# Patient Record
Sex: FEMALE | Race: BLACK OR AFRICAN AMERICAN | NOT HISPANIC OR LATINO | Employment: FULL TIME | URBAN - METROPOLITAN AREA
[De-identification: names, ages, dates, MRNs, and addresses within clinical notes are randomized per-mention and may not be internally consistent; named-entity substitution may affect disease eponyms.]

---

## 2017-02-08 ENCOUNTER — PATIENT MESSAGE (OUTPATIENT)
Dept: SURGERY | Facility: CLINIC | Age: 47
End: 2017-02-08

## 2017-02-09 RX ORDER — TRIAMCINOLONE ACETONIDE 1 MG/G
1 OINTMENT TOPICAL 2 TIMES DAILY
Qty: 30 G | Refills: 12 | Status: SHIPPED | OUTPATIENT
Start: 2017-02-09 | End: 2017-05-01

## 2017-02-15 ENCOUNTER — PATIENT MESSAGE (OUTPATIENT)
Dept: SPORTS MEDICINE | Facility: CLINIC | Age: 47
End: 2017-02-15

## 2017-02-16 DIAGNOSIS — M17.11 PRIMARY OSTEOARTHRITIS OF RIGHT KNEE: Primary | ICD-10-CM

## 2017-02-16 RX ORDER — VIT C/E/ZN/COPPR/LUTEIN/ZEAXAN 250MG-90MG
1000 CAPSULE ORAL DAILY
Qty: 30 CAPSULE | Refills: 12 | Status: SHIPPED | OUTPATIENT
Start: 2017-02-16 | End: 2017-11-20 | Stop reason: SDUPTHER

## 2017-02-22 ENCOUNTER — PATIENT MESSAGE (OUTPATIENT)
Dept: SPORTS MEDICINE | Facility: CLINIC | Age: 47
End: 2017-02-22

## 2017-02-23 DIAGNOSIS — M17.10 PRIMARY LOCALIZED OSTEOARTHROSIS, LOWER LEG, UNSPECIFIED LATERALITY: ICD-10-CM

## 2017-02-23 RX ORDER — DICLOFENAC SODIUM 100 MG/1
100 TABLET, EXTENDED RELEASE ORAL 2 TIMES DAILY
Qty: 60 TABLET | Refills: 0 | Status: SHIPPED | OUTPATIENT
Start: 2017-02-23 | End: 2017-03-24 | Stop reason: SDUPTHER

## 2017-03-10 ENCOUNTER — HOSPITAL ENCOUNTER (OUTPATIENT)
Dept: RADIOLOGY | Facility: HOSPITAL | Age: 47
Discharge: HOME | End: 2017-03-10
Attending: ORTHOPAEDIC SURGERY
Payer: COMMERCIAL

## 2017-03-10 ENCOUNTER — OFFICE VISIT (OUTPATIENT)
Dept: SPORTS MEDICINE | Facility: CLINIC | Age: 47
End: 2017-03-10
Payer: COMMERCIAL

## 2017-03-10 VITALS
DIASTOLIC BLOOD PRESSURE: 98 MMHG | BODY MASS INDEX: 44.68 KG/M2 | SYSTOLIC BLOOD PRESSURE: 140 MMHG | WEIGHT: 278 LBS | HEART RATE: 76 BPM | HEIGHT: 66 IN

## 2017-03-10 DIAGNOSIS — M25.561 RIGHT KNEE PAIN, UNSPECIFIED CHRONICITY: ICD-10-CM

## 2017-03-10 DIAGNOSIS — M17.11 PRIMARY OSTEOARTHRITIS OF RIGHT KNEE: ICD-10-CM

## 2017-03-10 DIAGNOSIS — M25.561 RIGHT KNEE PAIN, UNSPECIFIED CHRONICITY: Primary | ICD-10-CM

## 2017-03-10 PROCEDURE — 99214 OFFICE O/P EST MOD 30 MIN: CPT | Mod: S$GLB,,, | Performed by: PHYSICIAN ASSISTANT

## 2017-03-10 PROCEDURE — 73564 X-RAY EXAM KNEE 4 OR MORE: CPT | Mod: TC,50,PO

## 2017-03-10 PROCEDURE — 99999 PR PBB SHADOW E&M-EST. PATIENT-LVL III: CPT | Mod: PBBFAC,,, | Performed by: PHYSICIAN ASSISTANT

## 2017-03-10 PROCEDURE — 73564 X-RAY EXAM KNEE 4 OR MORE: CPT | Mod: 26,50,, | Performed by: RADIOLOGY

## 2017-03-10 NOTE — MR AVS SNAPSHOT
[facility]  [address]  [facility] [address]  Phone: [phone]                  Camila Black   3/10/2017 2:30 PM   Appointment    Description:  Female : 1970   Provider:  Cecille Gillis PA-C   Department:  [facility]                To Do List           Future Appointments        Provider Department Dept Phone    2017 8:30 AM Tabitha Pandey MD [facility] - Internal Medicine [phone]      Goals (5 Years of Data)     None      [facility] On [facility]     [facility] Care [facility] -  Assistance  Registered nurses in the [facility] On Call Center provide clinical advisement, health education, appointment booking, and other advisory services.  Call for this free service at [phone].             Medications           Message regarding Medications     Verify the changes and/or additions to your medication regime listed below are the same as discussed with your clinician today.  If any of these changes or additions are incorrect, please notify your healthcare provider.             Verify that the below list of medications is an accurate representation of the medications you are currently taking.  If none reported, the list may be blank. If incorrect, please contact your healthcare provider. Carry this list with you in case of emergency.           Current Medications     cholecalciferol, vitamin D3, 1,000 unit capsule Take 1 capsule (1,000 Units total) by mouth once daily.    cyanocobalamin (VITAMIN B-12) 1000 MCG tablet Take 1 tablet (1,000 mcg total) by mouth once daily.    diclofenac sodium 100 mg 24 hr tablet Take 100 mg by mouth 2 (two) times daily.    econazole nitrate 1 % cream Apply topically once daily. Mix in hydrocortisone cream and aaa qhs    multivitamin capsule Take 1 capsule by mouth once daily.      oxybutynin (DITROPAN XL) 15 MG TR24 TAKE 1 TABLET (15 MG TOTAL) BY MOUTH ONCE DAILY.    triamcinolone acetonide 0.1% (KENALOG)  0.1 % ointment Apply 1 application topically 2 (two) times daily.           Clinical Reference Information           Allergies as of 3/10/2017     No Known Allergies      Immunizations Administered on Date of Encounter - 3/10/2017     None      Language Assistance Services     ATTENTION: Language assistance services are available, free of charge. Please call [phone].      ATENCIÓN: Si habla michelle, tiene a bauer disposición servicios gratuitos de asistencia lingüística. Llame al [phone].     CHÚ Ý: N?u b?n nói Ti?ng Vi?t, có các d?ch v? h? tr? ngôn ng? mi?n phí dành cho b?n. G?i s? [phone].         [facility] complies with applicable Federal civil rights laws and does not discriminate on the basis of race, color, national origin, age, disability, or sex.

## 2017-03-10 NOTE — PROGRESS NOTES
Subjective:          Chief Complaint: Camila Black is a 47 y.o. female who had concerns including Follow-up of the Right Knee.    Pain   Associated symptoms include joint swelling. Pertinent negatives include no abdominal pain, chest pain, chills, congestion, coughing, fever, headaches, myalgias, nausea, numbness, rash, sore throat or vomiting.      Patient presents to clinic for f/u following right knee Synvisc One injection 6 months ago on 8/11/16 by Dr. Irvin. She reports significant relief of pain with the visco supplementation. She has been taking [id number] twice a day and ibuprofen prn for pain relief. Pain today is 5/10. Pain is at its worst at 8/10 when it rains. Pain is worse with walking. Loud clicking while walking is concerning for pt. She is unable to run if she wants to and is no longer able to participate in [place]. She s not wearing her lateral  brace today.    Review of Systems   Constitution: Negative. Negative for chills, fever, weight gain and weight loss.   HENT: Negative for congestion, headaches and sore throat.    Eyes: Negative for blurred vision and double vision.   Cardiovascular: Negative for chest pain, leg swelling and palpitations.   Respiratory: Negative for cough and shortness of breath.    Skin: Negative for itching, poor wound healing and rash.   Musculoskeletal: Positive for joint pain, joint swelling, muscle weakness and stiffness. Negative for back pain and myalgias.   Gastrointestinal: Negative for abdominal pain, constipation, diarrhea, nausea and vomiting.   Genitourinary: Negative.  Negative for frequency and hematuria.   Neurological: Negative for dizziness, numbness, paresthesias and sensory change.   Psychiatric/Behavioral: Negative for altered mental status and depression. The patient is not nervous/anxious.    Allergic/Immunologic: Negative for hives.       Pain Related Questions  Over the past 3 days, what was your average pain during activity?  (I.e. running, jogging, walking, climbing stairs, getting dressed, ect.): 8  Over the past 3 days, what was your highest pain level?: 8  Over the past 3 days, what was your lowest pain level? : 2    Other  How many nights a week are you awakened by your affected body part?: 4  Was the patient's HEIGHT measured or patient reported?: Patient Reported  Was the patient's WEIGHT measured or patient reported?: Measured      Objective:        General: Camila is well-developed, well-nourished, appears stated age, in no acute distress, alert and oriented to time, place and person.     General    Vitals reviewed.  Constitutional: She is oriented to person, place, and time. She appears well-developed and well-nourished. No distress.   HENT:   Head: Normocephalic and atraumatic.   Eyes: EOM are normal.   Neck: Normal range of motion.   Cardiovascular: Normal rate and regular rhythm.    Pulmonary/Chest: Effort normal. No respiratory distress.   Neurological: She is alert and oriented to person, place, and time. She has normal reflexes. No cranial nerve deficit. Coordination normal.   Psychiatric: She has a normal mood and affect. Her behavior is normal. Judgment and thought content normal.     General Musculoskeletal Exam   Gait: normal       Right Knee Exam     Inspection   Erythema: absent  Scars: absent  Swelling: absent  Effusion: effusion  Deformity: deformity  Bruising: absent    Tenderness   The patient is experiencing no tenderness (anterior knee pain).         Range of Motion   Extension: 0   Flexion: 90     Tests   Meniscus   Pedro:  Medial - negative Lateral - negative  Ligament Examination Lachman: normal (-1 to 2mm) PCL-Posterior Drawer: normal (0 to 2mm)     MCL - Valgus: normal (0 to 2mm)  LCL - Varus: normalPivot Shift: normal (Equal)Reverse Pivot Shift: normal (Equal)  Patella   Passive Patellar Tilt: neutral  Patellar Glide (quadrants): Lateral - 1   Medial - 2  Patellar Grind: negative    Other    Sensation: normal    Comments:  Severe genu valgum     Left Knee Exam     Inspection   Erythema: absent  Scars: absent  Swelling: absent  Effusion: absent  Deformity: deformity  Bruising: absent    Tenderness   The patient is experiencing no tenderness.         Range of Motion   Extension: 0   Left knee flexion: 105.     Tests   Meniscus   Pedro:  Medial - negative Lateral - negative  Stability Lachman: normal (-1 to 2mm) PCL-Posterior Drawer: normal (0 to 2mm)  MCL - Valgus: normal (0 to 2mm)  LCL - Varus: normal (0 to 2mm)Pivot Shift: normal (Equal)Reverse Pivot Shift: normal (Equal)  Patella   Passive Patellar Tilt: neutral  Patellar Glide (Quadrants): Lateral - 1 Medial - 2  Patellar Grind: positive    Other   Sensation: normal    Muscle Strength   Right Lower Extremity   Quadriceps:  4/5   Hamstring:  3/5   Left Lower Extremity   Quadriceps:  4/5   Hamstring:  3/5     Reflexes     Left Side  Quadriceps:  2+  Achilles:  2+    Right Side   Quadriceps:  2+  Achilles:  2+    Vascular Exam     Right Pulses  Dorsalis Pedis:      2+  Posterior Tibial:      2+        Left Pulses  Dorsalis Pedis:      2+  Posterior Tibial:      2+        Edema  Right Lower Leg: absent  Left Lower Leg: absent  RADIOGRAPHS:  Findings:  4 views of the knee, comparison 2016.  Advanced tricompartment DJD right knee joint with genu valgus, less prominent DJD changes left knee joint femoral tibial joint, but advanced DJD both patellofemoral joints.  No fracture, dislocation, joint effusion.  bilateral subcutaneous opacities, varicosities, more prominent right..        Assessment:       Encounter Diagnoses   Name Primary?    Right knee pain, unspecified chronicity Yes    Primary osteoarthritis of right knee           Plan:         1. Continue Patellofemoral and Core HEP.    2. IKDC, SF-12 and KOOS was  filled out today in clinic.     RTC in 2 weeks with Cecille Gillis PA-C for right knee Synvisc One injection. Patient will fill out IKDC,  SF-12 and KOOS on return.    3. Continue lateral  brace.    4. Discussed in great detail total knee replacement. Will discuss with Dr. Irvin to determine surgical plan    5. Will link Synvisc One inj to next appt in 2 weeks                                  Patient questionnaires may have been collected.

## 2017-03-24 ENCOUNTER — PATIENT MESSAGE (OUTPATIENT)
Dept: SPORTS MEDICINE | Facility: CLINIC | Age: 47
End: 2017-03-24

## 2017-03-24 DIAGNOSIS — M17.10 PRIMARY LOCALIZED OSTEOARTHROSIS, LOWER LEG, UNSPECIFIED LATERALITY: ICD-10-CM

## 2017-03-24 DIAGNOSIS — M17.11 PRIMARY OSTEOARTHRITIS OF RIGHT KNEE: Primary | ICD-10-CM

## 2017-03-24 RX ORDER — DICLOFENAC SODIUM 100 MG/1
100 TABLET, EXTENDED RELEASE ORAL 2 TIMES DAILY
Qty: 60 TABLET | Refills: 0 | Status: SHIPPED | OUTPATIENT
Start: 2017-03-24 | End: 2017-05-23 | Stop reason: SDUPTHER

## 2017-03-27 ENCOUNTER — HOSPITAL ENCOUNTER (OUTPATIENT)
Dept: RADIOLOGY | Facility: HOSPITAL | Age: 47
Discharge: HOME | End: 2017-03-27
Attending: ORTHOPAEDIC SURGERY
Payer: COMMERCIAL

## 2017-03-27 DIAGNOSIS — M17.11 PRIMARY OSTEOARTHRITIS OF RIGHT KNEE: ICD-10-CM

## 2017-03-27 PROCEDURE — 73700 CT LOWER EXTREMITY W/O DYE: CPT | Mod: TC,RT

## 2017-03-27 PROCEDURE — 73700 CT LOWER EXTREMITY W/O DYE: CPT | Mod: 26,RT,, | Performed by: RADIOLOGY

## 2017-03-30 ENCOUNTER — TELEPHONE (OUTPATIENT)
Dept: SPORTS MEDICINE | Facility: CLINIC | Age: 47
End: 2017-03-30

## 2017-03-31 ENCOUNTER — TELEPHONE (OUTPATIENT)
Dept: SPORTS MEDICINE | Facility: CLINIC | Age: 47
End: 2017-03-31

## 2017-04-03 DIAGNOSIS — M17.11 PRIMARY LOCALIZED OSTEOARTHROSIS, LOWER LEG, RIGHT: Primary | ICD-10-CM

## 2017-05-01 ENCOUNTER — PATIENT MESSAGE (OUTPATIENT)
Dept: INTERNAL MEDICINE | Facility: CLINIC | Age: 47
End: 2017-05-01

## 2017-05-01 ENCOUNTER — HOSPITAL ENCOUNTER (OUTPATIENT)
Dept: RADIOLOGY | Facility: HOSPITAL | Age: 47
Discharge: HOME | End: 2017-05-01
Attending: INTERNAL MEDICINE
Payer: COMMERCIAL

## 2017-05-01 ENCOUNTER — OFFICE VISIT (OUTPATIENT)
Dept: INTERNAL MEDICINE | Facility: CLINIC | Age: 47
End: 2017-05-01
Payer: COMMERCIAL

## 2017-05-01 VITALS
HEIGHT: 67 IN | SYSTOLIC BLOOD PRESSURE: 140 MMHG | WEIGHT: 284.38 LBS | DIASTOLIC BLOOD PRESSURE: 80 MMHG | BODY MASS INDEX: 44.63 KG/M2

## 2017-05-01 DIAGNOSIS — D23.4 BENIGN NEOPLASM OF SCALP AND SKIN OF NECK: ICD-10-CM

## 2017-05-01 DIAGNOSIS — E61.1 IRON DEFICIENCY: ICD-10-CM

## 2017-05-01 DIAGNOSIS — Z98.84 S/P GASTRIC BYPASS: ICD-10-CM

## 2017-05-01 DIAGNOSIS — Z00.00 ANNUAL PHYSICAL EXAM: Primary | ICD-10-CM

## 2017-05-01 DIAGNOSIS — K82.8 GALLBLADDER SLUDGE: ICD-10-CM

## 2017-05-01 DIAGNOSIS — E55.9 MILD VITAMIN D DEFICIENCY: ICD-10-CM

## 2017-05-01 DIAGNOSIS — K21.9 GASTROESOPHAGEAL REFLUX DISEASE WITHOUT ESOPHAGITIS: ICD-10-CM

## 2017-05-01 DIAGNOSIS — Z98.890 S/P ENDOMETRIAL ABLATION: ICD-10-CM

## 2017-05-01 DIAGNOSIS — R35.0 URINARY FREQUENCY: ICD-10-CM

## 2017-05-01 DIAGNOSIS — Z12.31 OTHER SCREENING MAMMOGRAM: ICD-10-CM

## 2017-05-01 DIAGNOSIS — R51.9 HEADACHE, UNSPECIFIED HEADACHE TYPE: ICD-10-CM

## 2017-05-01 DIAGNOSIS — I10 ESSENTIAL HYPERTENSION: ICD-10-CM

## 2017-05-01 DIAGNOSIS — E53.8 B12 DEFICIENCY: ICD-10-CM

## 2017-05-01 DIAGNOSIS — G47.33 OSA (OBSTRUCTIVE SLEEP APNEA): ICD-10-CM

## 2017-05-01 DIAGNOSIS — E66.01 MORBID OBESITY WITH BMI OF 40.0-44.9, ADULT: ICD-10-CM

## 2017-05-01 PROCEDURE — 87086 URINE CULTURE/COLONY COUNT: CPT

## 2017-05-01 PROCEDURE — 93005 ELECTROCARDIOGRAM TRACING: CPT | Mod: S$GLB,,, | Performed by: INTERNAL MEDICINE

## 2017-05-01 PROCEDURE — 99999 PR PBB SHADOW E&M-EST. PATIENT-LVL V: CPT | Mod: 25,PBBFAC,, | Performed by: INTERNAL MEDICINE

## 2017-05-01 PROCEDURE — 99396 PREV VISIT EST AGE 40-64: CPT | Mod: S$GLB,,, | Performed by: INTERNAL MEDICINE

## 2017-05-01 PROCEDURE — 71020 XR CHEST PA AND LATERAL: CPT | Mod: TC

## 2017-05-01 PROCEDURE — 71020 XR CHEST PA AND LATERAL: CPT | Mod: 26,,, | Performed by: RADIOLOGY

## 2017-05-01 PROCEDURE — 77067 SCR MAMMO BI INCL CAD: CPT | Mod: 26,,, | Performed by: RADIOLOGY

## 2017-05-01 PROCEDURE — 77067 SCR MAMMO BI INCL CAD: CPT | Mod: TC

## 2017-05-01 PROCEDURE — 93010 ELECTROCARDIOGRAM REPORT: CPT | Mod: S$GLB,,, | Performed by: INTERNAL MEDICINE

## 2017-05-01 RX ORDER — LOSARTAN POTASSIUM 50 MG/1
50 TABLET ORAL DAILY
Qty: 90 TABLET | Refills: 3 | Status: SHIPPED | OUTPATIENT
Start: 2017-05-01 | End: 2017-09-07

## 2017-05-01 NOTE — MR AVS SNAPSHOT
[facility] - Internal Medicine  [address]  [facility] [address]  Phone: [phone]  Fax: [phone]                  Camila Black   2017 7:30 AM   Office Visit    Description:  Female : 1970   Provider:  Tabitha Pandey MD   Department:  [facility] - Internal Medicine           Reason for Visit     Annual Exam           Diagnoses this Visit        Comments    Annual physical exam    -  Primary     ARPIT (obstructive sleep apnea)         S/P gastric bypass         Morbid obesity with BMI of 40.0-44.9, adult         Headache, unspecified headache type         Other screening mammogram         Gastroesophageal reflux disease without esophagitis         Iron deficiency         Mild vitamin D deficiency         B12 deficiency         Benign neoplasm of scalp and skin of neck         Gallbladder sludge         S/P endometrial ablation         Urinary frequency         Essential hypertension                To Do List           Future Appointments        Provider Department Dept Phone    2017 10:30 AM [facility] [phone]    2017 9:15 AM Cecille Gillis PA-C [facility] [phone]    2017 11:00 AM PRE-ADMIT, [facility] [phone]    7/3/2017 9:15 AM Cecille Gillis PA-C [facility] [phone]    2017 9:15 AM Grace Irvin MD [facility] [phone]      Your Future Surgeries/Procedures     2017   Surgery with Grace Irvin MD   [facility] ([facility])    [address] [facility] [address]   [phone]              Goals (5 Years of Data)     None       These Medications        Disp Refills Start End    losartan (COZAAR) 50 MG tablet 90 tablet 3 2017    Take 1 tablet (50 mg total) by mouth once daily. - Oral    Pharmacy: [facility]/pharmacy #[id number] - [place] -  [address] [facility] #: [phone]         [facility]     [facility] Care [facility] - 24/7 Assistance  Unless otherwise directed by your provider, please contact [facility], our nurse care line that is available for 24/7 assistance.     Registered nurses in the [facility] provide: appointment scheduling, clinical advisement, health education, and other advisory services.  Call: [phone] (toll free)               Medications           Message regarding Medications     Verify the changes and/or additions to your medication regime listed below are the same as discussed with your clinician today.  If any of these changes or additions are incorrect, please notify your healthcare provider.        START taking these NEW medications        Refills    losartan (COZAAR) 50 MG tablet 3    Sig: Take 1 tablet (50 mg total) by mouth once daily.    Class: Normal    Route: Oral      STOP taking these medications     triamcinolone acetonide 0.1% (KENALOG) 0.1 % ointment Apply 1 application topically 2 (two) times daily.           Verify that the below list of medications is an accurate representation of the medications you are currently taking.  If none reported, the list may be blank. If incorrect, please contact your healthcare provider. Carry this list with you in case of emergency.           Current Medications     cholecalciferol, vitamin D3, 1,000 unit capsule Take 1 capsule (1,000 Units total) by mouth once daily.    cyanocobalamin (VITAMIN B-12) 1000 MCG tablet Take 1 tablet (1,000 mcg total) by mouth once daily.    diclofenac sodium 100 mg 24 hr tablet Take 100 mg by mouth 2 (two) times daily.    econazole nitrate 1 % cream Apply topically once daily. Mix in hydrocortisone cream and aaa qhs    losartan (COZAAR) 50 MG tablet Take 1 tablet (50 mg total) by mouth once daily.    multivitamin capsule Take 1 capsule by mouth once daily.      oxybutynin (DITROPAN XL) 15 MG TR24 TAKE 1 TABLET (15  "MG TOTAL) BY MOUTH ONCE DAILY.           Clinical Reference Information           Your Vitals Were     BP Height Weight BMI       140/80 5' 7" (1.702 m) 129 kg (284 lb 6.3 oz) 44.54 kg/m2       Blood Pressure          Most Recent Value    BP  (!)  140/80      Allergies as of 5/1/2017     No Known Allergies      Immunizations Administered on Date of Encounter - 5/1/2017     None      Orders Placed During Today's Visit      Normal Orders This Visit    Ambulatory consult to Bariatric Surgery     Ambulatory consult to Gynecology     Ambulatory consult to Sleep Disorders     EKG 12-lead     Urinalysis     Urine culture     Future Labs/Procedures Expected by Expires    CBC auto differential  5/1/2017 5/1/2018    Comprehensive metabolic panel  5/1/2017 5/1/2018    Ferritin  5/1/2017 7/30/2017    Hemoglobin A1c  5/1/2017 5/1/2018    Iron and TIBC  5/1/2017 7/30/2017    Lipid panel  5/1/2017 5/1/2018    Mammo Digital Screening Bilat with CAD  5/1/2017 7/2/2018    TSH  5/1/2017 5/1/2018    Vitamin B12  5/1/2017 5/1/2018    Vitamin D  5/1/2017 (Approximate) 5/1/2018    X-Ray Chest PA And Lateral  5/1/2017 5/1/2018      Instructions      Obstructive Sleep Apnea  Obstructive sleep apnea is a condition that causes your air passages to become narrowed or blocked during sleep. As a result, breathing stops for short periods. Your body wakes up enough for breathing to begin again, though you don't remember it. The cycle of stopped breathing and brief awakenings can repeat dozens of times a night. This prevents the body from getting to the deeper stages of sleep that are needed for good rest.  Signs of sleep apnea include loud snoring, noisy breathing, and gasping sounds during sleep. Daytime symptoms include waking up tired after a full night's sleep, waking up with headaches, feeling very sleepy or falling asleep during the day, and having problems with memory or concentration.  Risk factors for sleep apnea include:  · Being " overweight  · Being a man, or a woman in menopause  · Smoking  · Using alcohol or sedating medications or herbs  · Having enlarged structures in the nose or throat  Home care  Lifestyle changes that can help treat snoring and sleep apnea include the following:  · If you are overweight, lose weight. Talk to your healthcare provider about a weight-loss plan for you.  · Avoid alcohol for 3 to 4 hours before bedtime. Avoid sedating medications. Ask your healthcare provider about the medications you take.  · If you smoke, talk to your healthcare provider about ways to quit.  · Sleep on your side. This can help prevent gravity from pulling relaxed throat tissues into your breathing passages.  · If you have allergies or sinus problems that block your nose, ask your healthcare provider for help.  Follow up  Follow up with your healthcare provider as advised. A diagnosis of sleep apnea is made with a sleep study. Your healthcare provider can tell you more about this test.  When to seek medical care  Sleep apnea can make you more likely to have certain health problems. These include high blood pressure, heart attack, stroke, and sexual dysfunction. If you have sleep apnea, talk to your healthcare provider about the best treatments for you.  Date Last Reviewed: 5/3/2015  © 4354-6947 [employer]. [address] [place] [address], [place] [address]. All rights reserved. This information is not intended as a substitute for professional medical care. Always follow your healthcare professional's instructions.        What Are Snoring and Obstructive Sleep Apnea?  If youve ever had a stuffed-up nose, you know the feeling of trying to breathe through a very narrow passageway. This is what happens in your throat when you snore. While you sleep, structures in your throat partially block your air passage, making the passage narrow and hard to breathe through. If the entire passage becomes blocked and you cant breathe at all, you  have sleep apnea.     Air moves freely through the nose, mouth and throat.   Snoring  If your throat structures are too large or the muscles relax too much during sleep, the air passage may be partially blocked. As air from the nose or mouth passes around this blockage, the throat structures vibrate, causing the familiar sound of snoring. At times, this sound can be so loud that snorers wake up others, or even themselves, during the night. Snoring gets worse as more and more of the air passage is blocked.     Air is blocked in the back of the mouth and throat.   Obstructive sleep apnea  If the structures completely block the throat, air cant flow to the lungs at all. This is called apnea (meaning no breathing). Since the lungs arent getting fresh air, the brain tells the body to wake up just enough to tighten the muscles and unblock the air passage. With a loud gasp, breathing begins again. This process may be repeated over and over again throughout the night, making your sleep fragmented with a lighter stage of sleep. Even though you do not remember waking up many times during the night to a lighter sleep, you feel tired the next day. The lack of sleep and fresh air can also strain your lungs, heart, and other organs, leading to problems such as high blood pressure, heart attack, or stroke.     Air may not be able to move freely past a deviated septum or swollen turbinates.   Problems in the nose and jaw  Problems in the structure of the nose may obstruct breathing. A crooked (deviated) septum or swollen turbinates can make snoring worse or lead to apnea. Also, a receding jaw may make the tongue sit too far back, so its more likely to block the airway when youre asleep.        Date Last Reviewed: 7/18/2015  © 0889-7182 [employer]. [address] [place] [address], [place] [address]. All rights reserved. This information is not intended as a substitute for professional medical care. Always follow your  healthcare professional's instructions.        Snoring and Sleep Apnea: Notes for a Partner  Snoring and sleep apnea affect your life, as well as your partners. You can help in the treatment of the problem. Be supportive. Encourage your partner both to get treatment and to make the adjustments needed to follow through.    Adjusting to changes  Your partners treatment may involve making changes to certain life habits. You can help your partner make and stick with these changes. For example:  · Support and even join your partners exercise program.  · Be supportive if your partner gets CPAP (continuous positive airway pressure). He or she may feel self-conscious at first. Remind your partner to expect adjustments to CPAP before it feels just right.  · Consider joining a snoring and sleep apnea support group.  Go along to see the healthcare provider  You can give the healthcare provider the best account of your partners nighttime breathing and snoring patterns. Try to go along to healthcare providers appointments. If you cant go, write notes for your partner to give to the healthcare provider. Describe your partners snoring and sleep breathing patterns in detail.  Tips for sleeping with a snorer  Until treatment takes care of your partners snoring:  · Try to go to bed first. It may help if youre already asleep when your partner starts to snore.  · Wear earplugs to bed. A fan or other source of background noise may also help drown out snoring.   Date Last Reviewed: 8/10/2015  © 5164-3338 [employer]. [address] [place] [address]. All rights reserved. This information is not intended as a substitute for professional medical care. Always follow your healthcare professional's instructions.        Possible Gallstone with Biliary Colic (Presumed)    Your abdominal pain is may be due to spasm of the gallbladder. This is called gallbladder or biliary colic. The gallbladder is a small sac  under the liver, which stores and releases bile. Bile is a fluid that aids in the digestion of fat. Eating fatty food stimulates the gallbladder to contract, and release the bile. A gallstone may form in this sac. Although most people do not have symptoms, when the stone moves and blocks the passage of bile out of the bladder, it can cause pain and even an infection.  To be more certain of the diagnosis, you may need to have an ultrasound, CT-scan or other special test.  A number of things increase the risk for developing gallstones:  · Women are more likely  · Obesity  · Increasing age  · Losing or gaining weight quickly  · High calorie diet  · Pregnancy  · Hormone therapy  · Diabetes  The most common symptoms are:  · Abdominal pain, cramping, aching  · Nausea, vomiting  · Fever  Many illnesses can cause these symptoms. This pain usually starts in the upper right side of your abdomen. Sometimes it can radiate to your right shoulder, back and arm. It usually starts suddenly, becomes more intense quickly, and then gradually decreases and disappears over a couple of hours. Elderly people and diabetics may have difficulty showing where the pain is exactly.  Home care  · Rest in bed and follow a clear liquid diet until feeling better. If pain or nausea medicine was given to help with your symptoms, take these as directed.  · You can take acetaminophen or ibuprofen for pain, unless you were given a different pain medicine to use.Note: If you have chronic liver or kidney disease or ever had a stomach ulcer or GI bleeding or are taking blood thinner medications, talk with your doctor before using these medicines.  · Fat in your diet makes the gallbladder contract and may cause increased pain. Therefore, avoid fat in your diet over the next two days and follow a low-fat diet after that. If you are overweight, a low fat diet will help you lose weight.  Follow-up care  If a test was already scheduled for you, keep this  appointment. Be sure you know how to prepare yourself for the test. Usually, you will be asked not to eat or drink anything for at least 8 hours before the test. Schedule an appointment with your own doctor after your test is complete to discuss the findings.  When to seek medical advice  Call your healthcare provider if any of the following occur:  · Pain gets worse or moves to the right lower abdomen  · Repeated vomiting  · Swelling of the abdomen  · Pain lasts over 6 hours  · Fever of 100.4º F (38º C) or higher, or as directed by your healthcare provider  · Weakness, dizziness  · Dark urine or light colored stools  · Yellow color of the skin or eyes  · Chest, arm, back, neck or jaw pain  Call 911  Get emergency medical care right away if any of these occur:  · Trouble breathing  · Very confused  · Very drowsy or trouble awakening  · Fainting or loss of consciousness  · Rapid heart rate  Date Last Reviewed: 8/23/2015  © 7138-7846 [employer]. [address] [place] [address], [place] [address]. All rights reserved. This information is not intended as a substitute for professional medical care. Always follow your healthcare professional's instructions.        Treating Gallstones    The gallbladder is an organ that stores bile. This is a substance that helps with digestion. Deposits in bile can clump together, creating hard, pebble-like stones. These gallstones may not cause any symptoms. In most cases, gallstones have no symptoms. In some cases, though, they irritate the walls of the gallbladder. Or they can block flow of bile out of the gallbladder. If they fall into the common bile duct, stones can block the flow of bile into the small bowel. This can lead to jaundice, pain, or serious infection. Stones are treated only if you have symptoms. If treatment is needed, your healthcare provider will discuss your choices with you. The most common treatments are listed below.  Medicine  Medicines to dissolve  gallstones don't really work.   ERCP  ERCP (endoscopic retrograde cholangiopancreatography) is an outpatient procedure that needs heavy sedation to remove stones. It uses a thin tube with video and X-rays to locate stones blocking the flow of bile. The stones are then removed. ERCP may be done alone. Or it may be used before surgery is done to remove the gallbladder.  Surgery  A cholecystectomy is an operation to remove the gallbladder and its contents (gallstones). Today, most cholecystectomies are done laparoscopically. This means using several very small abdominal incisions. Cholecystectomy may also be done with the traditional single, larger incision.   Prevent future symptoms  After treatment, you should follow a low-fat diet. This diet includes lean meats, lean poultry, and fish. Avoid full-fat dairy products. And limit vegetable oils. Read food labels to be sure theyre low in fat.   Date Last Reviewed: 5/1/2016  © 9183-3695 [employer]. [address] [place] [address]. All rights reserved. This information is not intended as a substitute for professional medical care. Always follow your healthcare professional's instructions.        What are Gallstones?    The gallbladder stores bile, a fluid made by the liver. Bile helps digest fats in the foods you eat. Gallstones form when certain substances in the bile crystallize and become solid. In some cases, the stones dont cause any symptoms. In others, they irritate the walls of the gallbladder. More serious problems can occur if stones move into nearby ducts--such as the common bile duct--and cause blockages. This can block the flow of bile and can lead to pain, nausea, and infection.  Common symptoms  Gallbladder problems can cause painful attacks, often after a meal. Some people have only one attack. Others have many. Common symptoms include:  · Severe, steady pain or aching in the upper right abdomen, back, or right shoulder blade,  lasting from 30 minutes to several hours  · A dull ache beneath the ribs or breastbone  · Nausea, upset stomach, or vomiting  · Jaundice (a buildup of bile chemicals in the blood), which causes yellowing of the skin and eyes, dark urine, and itching. Call your doctor immediately if this system develops.  Treating gallstones  If your stones are not causing symptoms, you may choose to delay treatment. But if youve had one or more painful attacks, your doctor will likely recommend removing your gallbladder. This prevents more stones from forming and causing attacks. It also helps prevent complications, such as stones passing into the ducts and causing infection or pancreatitis. After the gallbladder is removed, your liver will still make bile to aid digestion.     If youre pregnant  Hormone changes during pregnancy can make bile more likely to form stones. If your gallbladder needs to be removed, your doctor will talk with you about the timing for surgery. In some cases, it can be delayed until after childbirth. In others, you may have surgery during pregnancy. This helps protect you and your babys health.   Date Last Reviewed: 3/15/2014  © 4179-3210 [employer]. [address] [place] [address]. All rights reserved. This information is not intended as a substitute for professional medical care. Always follow your healthcare professional's instructions.        Having Laparoscopic Cholecystectomy  Small incisions are made in your belly (abdomen). The scope is put through one of the incisions. Surgical tools are put through other incisions.  Small clips are used to close off the connection between the gallbladder and the bile duct. The gallbladder is then detached from the liver.  The gallbladder is removed through one of the incisions. Bile still flows from the liver to the small intestine.  When the surgery is done, all tools are removed. Incisions are closed with stitches (sutures) or  staples. Sometimes, a laparoscopic surgery may need to be changed to an open surgery. This method uses one large incision. This change may happen because of scar tissue, unusual anatomy, or for some other reason.     Possible incision sites.   Youve had painful attacks caused by gallstones. Because of this, you are having surgery to remove your gallbladder. This is called cholecystectomy. A method called laparoscopy will be used. This allows surgery to be done through a few small cuts (incisions).  Before your surgery  · Tell your provider what medicines you take. This includes prescription medicines, over-the-counter medicines, street drugs, herbs, vitamins, and other supplements. Be sure to mention if you take prescription blood thinners. This includes warfarin, clopidogrel, ibuprofen, and aspirin.  · If you drink alcohol, tell your provider how much you drink. This is very important if you are a heavy drinker or have had alcohol withdrawal symptoms in the past. Alcohol withdrawal can be dangerous. But the symptoms can be safely managed if your healthcare provider knows your alcohol history.  · Have any tests your provider asks for, such as blood tests.  · Dont eat or drink after midnight the night before your surgery. This includes water, coffee, and mints.  The day of surgery  · Your provider may have you take your normal medicine with a sip of water. Check with your provider.   When you arrive, you will prepare for surgery:  · An IV (intravenous) line will be put into a vein in your arm or hand. This gives you fluids and medicine.  · An anesthesiologist will talk with you about anesthesia. This is medicine used to prevent pain. You will receive general anesthesia. This puts you into a deep sleep-like state through the procedure.  During laparoscopic surgery  For this surgery, a thin tube with a tiny camera is used. This is called a laparoscope. The scope sends images from inside your body to a video screen.  This lets the surgeon view and work on your gallbladder:  · Small incisions are made in your belly (abdomen). The scope is put through one of the incisions. Surgical tools are put through other incisions.  · Small clips are used to close off the connection between the gallbladder and the bile duct. The gallbladder is then detached from the liver.  · The gallbladder is removed through one of the incisions. Bile still flows from the liver to the small intestine.  · When the surgery is done, all tools are removed. Incisions are closed with stitches (sutures) or staples. Sometimes, a laparoscopic surgery may need to be changed to an open surgery. This method uses one large incision. This change may happen because of scar tissue, unusual anatomy, or for some other reason.  After surgery  You will be sent to a room to wake up from the anesthesia. You will likely go home the same day. In some cases, an overnight stay is needed. When you are released to go home, have a family member or friend ready to drive you.  Risks and possible complications of gallbladder surgery  All surgeries have risks. The risks of gallbladder surgery include:  · Bleeding  · Infection  · Injury to the common bile duct or nearby organs  · Blood clots in the legs  · Bile leaks  · Hernia at incision site  · Pneumonia   Date Last Reviewed: 7/1/2016  © 5735-9914 [employer]. [address] [place] [address], [place] [address]. All rights reserved. This information is not intended as a substitute for professional medical care. Always follow your healthcare professional's instructions.        Losartan tablets  What is this medicine?  LOSARTAN (raegan AMRIK tan) is used to treat high blood pressure and to reduce the risk of stroke in certain patients. This drug also slows the progression of kidney disease in patients with diabetes.  How should I use this medicine?  Take this medicine by mouth with a glass of water. Follow the directions on the prescription  label. This medicine can be taken with or without food. Take your doses at regular intervals. Do not take your medicine more often than directed.  Talk to your pediatrician regarding the use of this medicine in children. Special care may be needed.  What side effects may I notice from receiving this medicine?  Side effects that you should report to your doctor or health care professional as soon as possible:  · confusion, dizziness, light headedness or fainting spells  · decreased amount of urine passed  · difficulty breathing or swallowing, hoarseness, or tightening of the throat  · fast or irregular heart beat, palpitations, or chest pain  · skin rash, itching  · swelling of your face, lips, tongue, hands, or feet  Side effects that usually do not require medical attention (report to your doctor or health care professional if they continue or are bothersome):  · cough  · decreased sexual function or desire  · headache  · nasal congestion or stuffiness  · nausea or stomach pain  · sore or cramping muscles  What may interact with this medicine?  · blood pressure medicines  · diuretics, especially triamterene, spironolactone, or amiloride  · fluconazole  · NSAIDs, medicines for pain and inflammation, like ibuprofen or naproxen  · potassium salts or potassium supplements  · rifampin  What if I miss a dose?  If you miss a dose, take it as soon as you can. If it is almost time for your next dose, take only that dose. Do not take double or extra doses.  Where should I keep my medicine?  Keep out of the reach of children.  Store at room temperature between 15 and 30 degrees C (59 and 86 degrees F). Protect from light. Keep container tightly closed. Throw away any unused medicine after the expiration date.  What should I tell my health care provider before I take this medicine?  They need to know if you have any of these conditions:  · heart failure  · kidney or liver disease  · an unusual or allergic reaction to losartan,  other medicines, foods, dyes, or preservatives  · pregnant or trying to get pregnant  · breast-feeding  What should I watch for while using this medicine?  Visit your doctor or health care professional for regular checks on your progress. Check your blood pressure as directed. Ask your doctor or health care professional what your blood pressure should be and when you should contact him or her. Call your doctor or health care professional if you notice an irregular or fast heart beat.  Women should inform their doctor if they wish to become pregnant or think they might be pregnant. There is a potential for serious side effects to an unborn child, particularly in the second or third trimester. Talk to your health care professional or pharmacist for more information.  You may get drowsy or dizzy. Do not drive, use machinery, or do anything that needs mental alertness until you know how this drug affects you. Do not stand or sit up quickly, especially if you are an older patient. This reduces the risk of dizzy or fainting spells. Alcohol can make you more drowsy and dizzy. Avoid alcoholic drinks.  Avoid salt substitutes unless you are told otherwise by your doctor or health care professional.  Do not treat yourself for coughs, colds, or pain while you are taking this medicine without asking your doctor or health care professional for advice. Some ingredients may increase your blood pressure.  Date Last Reviewed:   NOTE:This sheet is a summary. It may not cover all possible information. If you have questions about this medicine, talk to your doctor, pharmacist, or health care provider. Copyright© 2016 [employer]        Taking Your Blood Pressure  Blood pressure is the force of blood against the artery wall as it moves from the heart through the blood vessels. You can take your own blood pressure reading using a digital monitor. Take readings as often as your healthcare provider instructs. Take each reading at the same  time of day.  Step 1. Relax    · Take your blood pressure at the same time every day, such as in the morning or evening, or at the time your healthcare provider recommends.  · Wait at least a half-hour after smoking, eating, drinking caffeinated beverages, or exercising.  · Sit comfortably at a table with both feet on the floor. Do not cross your legs or feet. Place the monitor near you.  · Rest for a few minutes before you begin.  Step 2. Wrap the cuff    · Place your arm on the table, palm up. Your arm should be at the level of your heart. Wrap the cuff around your upper arm, just above your elbow. Its best done on bare skin, not over clothing. Most cuffs will indicate where the brachial artery (the blood vessel in the middle of the arm at the inner side of the elbow) should line up with the cuff. Look in your monitor's instruction booklet for an illustration. You can also bring your cuff to your healthcare provider and have them show you how to correctly place the cuff.  · Make sure your cuff fits. If it doesnt wrap around your upper arm, order a larger cuff.  Step 3. Inflate the cuff    · Pump the cuff until the scale reads 160. If you have a self-inflating cuff, push the button that starts the pump.  · The cuff will tighten, then loosen.  · The numbers will change. When they stop changing, your blood pressure reading will appear.  · Take 2 or 3 readings one minute apart.  Step 4. Write down the results of each reading    · Write down your blood pressure numbers for each reading. Note the date and time. Keep your results in one place, such as a notebook. Even if your monitor has a built-in memory, keep a hard copy of the readings.  · Remove the cuff from your arm. Turn off the machine.  · Share your blood pressure records with your healthcare providers at each visit.  Date Last Reviewed: 4/27/2016  © 2119-3176 [employer]. [address] [place] [address], [place] [address]. All rights reserved. This  information is not intended as a substitute for professional medical care. Always follow your healthcare professional's instructions.        Controlling High Blood Pressure  High blood pressure (hypertension) is often called the silent killer. This is because many people who have it dont know it. High blood pressure is defined as 140/90 mm Hg or higher. Know your blood pressure and remember to check it regularly. Doing so can save your life. Here are some things you can do to help control your blood pressure.    Choose heart-healthy foods  · Select low-salt, low-fat foods. Limit sodium intake to 2,400 mg per day or the amount suggested by your healthcare provider.  · Limit canned, dried, cured, packaged, and fast foods. These can contain a lot of salt.  · Eat 8 to 10 servings of fruits and vegetables every day.  · Choose lean meats, fish, or chicken.  · Eat whole-grain pasta, brown rice, and beans.  · Eat 2 to 3 servings of low-fat or fat-free dairy products.  · Ask your doctor about the DASH eating plan. This plan helps reduce blood pressure.  · When you go to a restaurant, ask that your meal be prepared with no added salt.  Maintain a healthy weight  · Ask your healthcare provider how many calories to eat a day. Then stick to that number.  · Ask your healthcare provider what weight range is healthiest for you. If you are overweight, a weight loss of only 3% to 5% of your body weight can help lower blood pressure. Generally, a good weight loss goal is to lose 10% of your body weight in a year.  · Limit snacks and sweets.  · Get regular exercise.  Get up and get active  · Choose activities you enjoy. Find ones you can do with friends or family. This includes bicycling, dancing, walking, and jogging.  · Park farther away from building entrances.  · Use stairs instead of the elevator.  · When you can, walk or bike instead of driving.  · [place] leaves, garden, or do household repairs.  · Be active at a moderate to  vigorous level of physical activity for at least 40 minutes for a minimum of 3 to 4 days a week.   Manage stress  · Make time to relax and enjoy life. Find time to laugh.  · Communicate your concerns with your loved ones and your healthcare provider.  · Visit with family and friends, and keep up with hobbies.  Limit alcohol and quit smoking  · Men should have no more than 2 drinks per day.  · Women should have no more than 1 drink per day.  · Talk with your healthcare provider about quitting smoking. Smoking significantly increases your risk for heart disease and stroke. Ask your healthcare provider about community smoking cessation programs and other options.  Medicines  If lifestyle changes arent enough, your healthcare provider may prescribe high blood pressure medicine. Take all medicines as prescribed. If you have any questions about your medicines, ask your healthcare provider before stopping or changing them.   Date Last Reviewed: 4/27/2016  © 2692-4466 [employer]. [address] [place] [address]. All rights reserved. This information is not intended as a substitute for professional medical care. Always follow your healthcare professional's instructions.        High Blood Pressure, New, Begin Treatment  Your blood pressure was high enough today to start treatment with medicines. Often health care providers dont know what causes high blood pressure (hypertension). But it can be controlled with lifestyle changes and medicines. High blood pressure usually has no symptoms. But it can sometimes cause headache, dizziness, blurred vision, a rushing sound in your ears, chest pain, or shortness of breath. But even without symptoms, high blood pressure thats not treated raises your risk for heart attack and stroke. High blood pressure is a serious health risk and shouldnt be ignored.    A blood pressure reading is made up of two numbers: a higher number over a lower number. The top number  is the systolic pressure. The bottom number is the diastolic pressure. A normal blood pressure is less than 120 over less than 80.  High blood pressure is when either the top number is 140 or higher, or the bottom number is 90 or higher. This must be the result when taking your blood pressure a number of times. The blood pressures between normal and high are called prehypertension.  Home care  If you have high blood pressure, you should do what is listed below to lower your blood pressure. If you are taking medicines for high blood pressure, these methods may reduce or end your need for medicines in the future.  · Begin a weight-loss program if you are overweight.  · Cut back on how much salt you get in your diet. Heres how to do this:  ¨ Dont eat foods that have a lot of salt. These include olives, pickles, smoked meats, and salted potato chips.  ¨ Dont add salt to your food at the table.  ¨ Use only small amounts of salt when cooking.  · Begin an exercise program. Talk with your health care provider about the type of exercise program that would be best for you. It doesn't have to be hard. Even brisk walking for 20 minutes 3 times a week is a good form of exercise.  · Dont take medicines that have heart stimulants. This includes many cold and sinus decongestant pills and sprays, as well as diet pills. Check the warnings about hypertension on the label. Stimulants such as amphetamine or cocaine could be lethal for someone with high blood pressure. Never take these.  · Limit how much caffeine you get in your diet. Switch to caffeine-free products.  · Stop smoking. If you are a long-time smoker, this can be hard. Enroll in a stop-smoking program to make it more likely that you will quit for good.  · Learn how to handle stress. This is an important part of any program to lower blood pressure. Learn about relaxation methods like meditation, yoga, or biofeedback.  · If your provider prescribed medicines, take them  exactly as directed. Missing doses may cause your blood pressure get out of control.  · Consider buying an automatic blood pressure machine. You can get one of these at most pharmacies. Use this to watch your blood pressure at home. Give the results to your provider.  Follow-up care  Because a new blood pressure medicine was started today, its important that you have your blood pressure rechecked. This is to make sure that the medicine is working and that you have no serious side effects. Unless told otherwise, follow up with your health care provider or this facility within the next 3 days.  When to seek medical advice  Call your health care provider right away if any of these occur:  · Chest pain or shortness of breath  · Severe headache  · Throbbing or rushing sound in the ears  · Nosebleed  · Sudden severe pain in your belly (abdomen)  · Extreme drowsiness, confusion, or fainting  · Dizziness or dizziness with a spinning sensation (vertigo)  · Weakness of an arm or leg or one side of the face  · You have problems speaking or seeing   Date Last Reviewed: 11/25/2014  © 0214-7827 [employer]. [address] [place] [address]. All rights reserved. This information is not intended as a substitute for professional medical care. Always follow your healthcare professional's instructions.        Low-Salt Choices  Eating salt (sodium) can make your body retain too much water. Excess water makes your heart work harder. Canned, packaged, and frozen foods are easy to prepare, but they are often high in sodium. Here are some ideas for low-salt foods you can easily prepare yourself.    For breakfast  · Fruit or 100% fruit juice  · Whole-wheat bread or an English muffin. Compare sodium content on labels.  · Low-fat milk or yogurt  · Unsalted eggs  · Shredded wheat  · Corn tortillas  · Unsalted steamed rice  · Regular (not instant) hot cereal, made without salt  Stay away from:  · Sausage, joseph, and  ham  · Flour tortillas  · Packaged muffins, pancakes, and biscuits  · Instant hot cereals  · Cottage cheese  For lunch and dinner  · Fresh fish, chicken, turkey, or meat--baked, broiled, or roasted without salt  · Dry beans, cooked without salt  · Tofu, stir-fried without salt  · Unsalted fresh fruit and vegetables, or frozen or canned fruit and vegetables with no added salt  Stay away from:  · Lunch or deli meat that is cured or smoked  · Cheese  · Tomato juice and catsup  · Canned vegetables, soups, and fish not labeled as no-salt-added or reduced sodium  · Packaged gravies and sauces  · Olives, pickles, and relish  · Bottled salad dressings  For snacks and desserts  · Yogurt  · Unsalted, air popped popcorn  · Unsalted nuts or seeds  Stay away from:  · Pies and cakes  · Packaged dessert mixes  · Pizza  · Canned and packaged puddings  · Pretzels, chips, crackers, and nuts--unless the label says unsalted  Date Last Reviewed: 6/17/2015  © 6304-3588 [employer]. [address] [place] [address]. All rights reserved. This information is not intended as a substitute for professional medical care. Always follow your healthcare professional's instructions.        Prevention Guidelines, Women Ages 40 to 49  Screening tests and vaccines are an important part of managing your health. Health counseling is essential, too. Below are guidelines for these, for women ages 40 to 49. Talk with your healthcare provider to make sure youre up-to-date on what you need.  Screening Who needs it How often   Type 2 diabetes or prediabetes All adults beginning at age 45 and adults without symptoms at any age who are overweight or obese and have 1 or more additional risk factors for diabetes At least every 3 years   Alcohol misuse All women in this age group At routine exams   Blood pressure All women in this age group Every 2 years if your blood pressure is less than 120/80 mm Hg; yearly if your systolic blood  pressure is 120 to 139 mm Hg, or your diastolic blood pressure reading is 80 to 89 mm Hg   Breast cancer All women in this age group Yearly mammogram and clinical breast exam2  Each woman should make her own decision. If a woman decides to have mammograms before age 50, they should be done every 2 years.3   Cervical cancer All women in this age group, except women who have had a complete hysterectomy Pap test every 3 years or Pap test plus human papilloma virus (HPV) test every 5 years   Chlamydia Women at increased risk for infection At routine exams if you're at risk or have symptoms   Depression All women in this age group At routine exams   Gonorrhea Sexually active women at increased risk for infection At routine exams   Hepatitis C Anyone at increased risk; 1 time for those born between 1945 and 1965 At routine exams   High cholesterol or triglycerides All women ages 45 and older who are at risk for coronary artery disease; younger women, talk with your healthcare provider At least every 5 years   HIV All women At routine exams   Obesity All women in this age group At routine exams   Syphilis Women at increased risk for infection - talk with your healthcare provider At routine exams   Tuberculosis Women at increased risk for infection - talk with your healthcare provider Ask your healthcare provider   Vision All women in this age group Complete exam at age 40 and eye exams every 2 to 4 years. If you have a chronic disease, ask your healthcare provider how often you should have your eyes examined.4   Vaccine Who needs it How often   Chickenpox (varicella) All women in this age group who have no record of this infection or vaccine 2 doses; the second dose should be given at least 4 weeks after the first dose   Hepatitis A Women at increased risk for infection - talk with your healthcare provider 2 doses given 6 months apart   Hepatitis B Women at increased risk for infection - talk with your healthcare provider  3 doses over 6 months; second dose should be given 1 month after the first dose; the third dose should be given at least 2 months after the second dose and at least 4 months after the first dose   Haemophilus influenzae Type B (HIB) Women at increased risk 1 to 3 doses   Influenza (flu) All women in this age group Once a year   Measles, mumps, rubella (MMR) All women in this age group who have no record of these infections or vaccines 1 or 2 doses   Meningococcal Women at increased risk for infection - talk with your healthcare provider 1 or more doses   Pneumococcal conjugate vaccine (PCV13) and pneumococcal polysaccharide vaccine (PPSV23) Women at increased risk for infection - talk with your healthcare provider 1 or 2 doses   Tetanus/diphtheria/pertussis (Td/Tdap) booster All women in this age group A one-time dose of Tdap instead of a Td booster after age 18, then Td every 10 years   Counseling Who needs it How often   BRCA gene mutation testing for breast and ovarian cancer susceptibility Women with increased risk for having gene mutation When your risk is known   Breast cancer and chemoprevention Women at high risk for breast cancer When your risk is known   Diet and exercise Women who are overweight or obese When diagnosed, and then at routine exams   Domestic violence Women at the age in which they are able to have children At routine exams   Sexually transmitted infection prevention Women at increased risk for infection - talk with your healthcare provider At routine exams   Use of tobacco and the health effects it can cause All women in this age group Every exam   1American Diabetes Association  2American Cancer Society  3U.S. Preventive Services Task Force  4[facility] [employer]  Date Last Reviewed: 8/27/2015 © 2000-2016 [employer] [address] [place] [address], [place] [address]. All rights reserved. This information is not intended as a substitute for professional medical care.  Always follow your healthcare professional's instructions.             Language Assistance Services     ATTENTION: Language assistance services are available, free of charge. Please call [phone].      ATENCIÓN: Si habsarmad martinez, tiene a bauer disposición servicios gratuitos de asistencia lingüística. Llame al [phone].     CHÚ Ý: N?u b?n nói Ti?ng Vi?t, có các d?ch v? h? tr? ngôn ng? mi?n phí dành cho b?n. G?i s? [phone].         [facility] complies with applicable Federal civil rights laws and does not discriminate on the basis of race, color, national origin, age, disability, or sex.

## 2017-05-01 NOTE — PATIENT INSTRUCTIONS
Obstructive Sleep Apnea  Obstructive sleep apnea is a condition that causes your air passages to become narrowed or blocked during sleep. As a result, breathing stops for short periods. Your body wakes up enough for breathing to begin again, though you don't remember it. The cycle of stopped breathing and brief awakenings can repeat dozens of times a night. This prevents the body from getting to the deeper stages of sleep that are needed for good rest.  Signs of sleep apnea include loud snoring, noisy breathing, and gasping sounds during sleep. Daytime symptoms include waking up tired after a full night's sleep, waking up with headaches, feeling very sleepy or falling asleep during the day, and having problems with memory or concentration.  Risk factors for sleep apnea include:  · Being overweight  · Being a man, or a woman in menopause  · Smoking  · Using alcohol or sedating medications or herbs  · Having enlarged structures in the nose or throat  Home care  Lifestyle changes that can help treat snoring and sleep apnea include the following:  · If you are overweight, lose weight. Talk to your healthcare provider about a weight-loss plan for you.  · Avoid alcohol for 3 to 4 hours before bedtime. Avoid sedating medications. Ask your healthcare provider about the medications you take.  · If you smoke, talk to your healthcare provider about ways to quit.  · Sleep on your side. This can help prevent gravity from pulling relaxed throat tissues into your breathing passages.  · If you have allergies or sinus problems that block your nose, ask your healthcare provider for help.  Follow up  Follow up with your healthcare provider as advised. A diagnosis of sleep apnea is made with a sleep study. Your healthcare provider can tell you more about this test.  When to seek medical care  Sleep apnea can make you more likely to have certain health problems. These include high blood pressure, heart attack, stroke, and sexual  dysfunction. If you have sleep apnea, talk to your healthcare provider about the best treatments for you.  Date Last Reviewed: 5/3/2015  © 9200-1250 [employer] [address] [place] [address], [place] [address]. All rights reserved. This information is not intended as a substitute for professional medical care. Always follow your healthcare professional's instructions.        What Are Snoring and Obstructive Sleep Apnea?  If youve ever had a stuffed-up nose, you know the feeling of trying to breathe through a very narrow passageway. This is what happens in your throat when you snore. While you sleep, structures in your throat partially block your air passage, making the passage narrow and hard to breathe through. If the entire passage becomes blocked and you cant breathe at all, you have sleep apnea.     Air moves freely through the nose, mouth and throat.   Snoring  If your throat structures are too large or the muscles relax too much during sleep, the air passage may be partially blocked. As air from the nose or mouth passes around this blockage, the throat structures vibrate, causing the familiar sound of snoring. At times, this sound can be so loud that snorers wake up others, or even themselves, during the night. Snoring gets worse as more and more of the air passage is blocked.     Air is blocked in the back of the mouth and throat.   Obstructive sleep apnea  If the structures completely block the throat, air cant flow to the lungs at all. This is called apnea (meaning no breathing). Since the lungs arent getting fresh air, the brain tells the body to wake up just enough to tighten the muscles and unblock the air passage. With a loud gasp, breathing begins again. This process may be repeated over and over again throughout the night, making your sleep fragmented with a lighter stage of sleep. Even though you do not remember waking up many times during the night to a lighter sleep, you feel tired  the next day. The lack of sleep and fresh air can also strain your lungs, heart, and other organs, leading to problems such as high blood pressure, heart attack, or stroke.     Air may not be able to move freely past a deviated septum or swollen turbinates.   Problems in the nose and jaw  Problems in the structure of the nose may obstruct breathing. A crooked (deviated) septum or swollen turbinates can make snoring worse or lead to apnea. Also, a receding jaw may make the tongue sit too far back, so its more likely to block the airway when youre asleep.        Date Last Reviewed: 7/18/2015 © 2000-2016 [employer]. [address] [place] [address], [place] [address]. All rights reserved. This information is not intended as a substitute for professional medical care. Always follow your healthcare professional's instructions.        Snoring and Sleep Apnea: Notes for a Partner  Snoring and sleep apnea affect your life, as well as your partners. You can help in the treatment of the problem. Be supportive. Encourage your partner both to get treatment and to make the adjustments needed to follow through.    Adjusting to changes  Your partners treatment may involve making changes to certain life habits. You can help your partner make and stick with these changes. For example:  · Support and even join your partners exercise program.  · Be supportive if your partner gets CPAP (continuous positive airway pressure). He or she may feel self-conscious at first. Remind your partner to expect adjustments to CPAP before it feels just right.  · Consider joining a snoring and sleep apnea support group.  Go along to see the healthcare provider  You can give the healthcare provider the best account of your partners nighttime breathing and snoring patterns. Try to go along to healthcare providers appointments. If you cant go, write notes for your partner to give to the healthcare provider. Describe your partners snoring  and sleep breathing patterns in detail.  Tips for sleeping with a snorer  Until treatment takes care of your partners snoring:  · Try to go to bed first. It may help if youre already asleep when your partner starts to snore.  · Wear earplugs to bed. A fan or other source of background noise may also help drown out snoring.   Date Last Reviewed: 8/10/2015  © 8450-4372 [employer]. [address] [place] [address], [place] [address]. All rights reserved. This information is not intended as a substitute for professional medical care. Always follow your healthcare professional's instructions.        Possible Gallstone with Biliary Colic (Presumed)    Your abdominal pain is may be due to spasm of the gallbladder. This is called gallbladder or biliary colic. The gallbladder is a small sac under the liver, which stores and releases bile. Bile is a fluid that aids in the digestion of fat. Eating fatty food stimulates the gallbladder to contract, and release the bile. A gallstone may form in this sac. Although most people do not have symptoms, when the stone moves and blocks the passage of bile out of the bladder, it can cause pain and even an infection.  To be more certain of the diagnosis, you may need to have an ultrasound, CT-scan or other special test.  A number of things increase the risk for developing gallstones:  · Women are more likely  · Obesity  · Increasing age  · Losing or gaining weight quickly  · High calorie diet  · Pregnancy  · Hormone therapy  · Diabetes  The most common symptoms are:  · Abdominal pain, cramping, aching  · Nausea, vomiting  · Fever  Many illnesses can cause these symptoms. This pain usually starts in the upper right side of your abdomen. Sometimes it can radiate to your right shoulder, back and arm. It usually starts suddenly, becomes more intense quickly, and then gradually decreases and disappears over a couple of hours. Elderly people and diabetics may have difficulty showing  where the pain is exactly.  Home care  · Rest in bed and follow a clear liquid diet until feeling better. If pain or nausea medicine was given to help with your symptoms, take these as directed.  · You can take acetaminophen or ibuprofen for pain, unless you were given a different pain medicine to use.Note: If you have chronic liver or kidney disease or ever had a stomach ulcer or GI bleeding or are taking blood thinner medications, talk with your doctor before using these medicines.  · Fat in your diet makes the gallbladder contract and may cause increased pain. Therefore, avoid fat in your diet over the next two days and follow a low-fat diet after that. If you are overweight, a low fat diet will help you lose weight.  Follow-up care  If a test was already scheduled for you, keep this appointment. Be sure you know how to prepare yourself for the test. Usually, you will be asked not to eat or drink anything for at least 8 hours before the test. Schedule an appointment with your own doctor after your test is complete to discuss the findings.  When to seek medical advice  Call your healthcare provider if any of the following occur:  · Pain gets worse or moves to the right lower abdomen  · Repeated vomiting  · Swelling of the abdomen  · Pain lasts over 6 hours  · Fever of 100.4º F (38º C) or higher, or as directed by your healthcare provider  · Weakness, dizziness  · Dark urine or light colored stools  · Yellow color of the skin or eyes  · Chest, arm, back, neck or jaw pain  Call 911  Get emergency medical care right away if any of these occur:  · Trouble breathing  · Very confused  · Very drowsy or trouble awakening  · Fainting or loss of consciousness  · Rapid heart rate  Date Last Reviewed: 8/23/2015 © 2000-2016 [employer]. [address] [place] [address], [place] [address]. All rights reserved. This information is not intended as a substitute for professional medical care. Always follow your healthcare  professional's instructions.        Treating Gallstones    The gallbladder is an organ that stores bile. This is a substance that helps with digestion. Deposits in bile can clump together, creating hard, pebble-like stones. These gallstones may not cause any symptoms. In most cases, gallstones have no symptoms. In some cases, though, they irritate the walls of the gallbladder. Or they can block flow of bile out of the gallbladder. If they fall into the common bile duct, stones can block the flow of bile into the small bowel. This can lead to jaundice, pain, or serious infection. Stones are treated only if you have symptoms. If treatment is needed, your healthcare provider will discuss your choices with you. The most common treatments are listed below.  Medicine  Medicines to dissolve gallstones don't really work.   ERCP  ERCP (endoscopic retrograde cholangiopancreatography) is an outpatient procedure that needs heavy sedation to remove stones. It uses a thin tube with video and X-rays to locate stones blocking the flow of bile. The stones are then removed. ERCP may be done alone. Or it may be used before surgery is done to remove the gallbladder.  Surgery  A cholecystectomy is an operation to remove the gallbladder and its contents (gallstones). Today, most cholecystectomies are done laparoscopically. This means using several very small abdominal incisions. Cholecystectomy may also be done with the traditional single, larger incision.   Prevent future symptoms  After treatment, you should follow a low-fat diet. This diet includes lean meats, lean poultry, and fish. Avoid full-fat dairy products. And limit vegetable oils. Read food labels to be sure theyre low in fat.   Date Last Reviewed: 5/1/2016  © 7636-9579 [employer]. [address] [place] [address], [place] [address]. All rights reserved. This information is not intended as a substitute for professional medical care. Always follow your healthcare  professional's instructions.        What are Gallstones?    The gallbladder stores bile, a fluid made by the liver. Bile helps digest fats in the foods you eat. Gallstones form when certain substances in the bile crystallize and become solid. In some cases, the stones dont cause any symptoms. In others, they irritate the walls of the gallbladder. More serious problems can occur if stones move into nearby ducts--such as the common bile duct--and cause blockages. This can block the flow of bile and can lead to pain, nausea, and infection.  Common symptoms  Gallbladder problems can cause painful attacks, often after a meal. Some people have only one attack. Others have many. Common symptoms include:  · Severe, steady pain or aching in the upper right abdomen, back, or right shoulder blade, lasting from 30 minutes to several hours  · A dull ache beneath the ribs or breastbone  · Nausea, upset stomach, or vomiting  · Jaundice (a buildup of bile chemicals in the blood), which causes yellowing of the skin and eyes, dark urine, and itching. Call your doctor immediately if this system develops.  Treating gallstones  If your stones are not causing symptoms, you may choose to delay treatment. But if youve had one or more painful attacks, your doctor will likely recommend removing your gallbladder. This prevents more stones from forming and causing attacks. It also helps prevent complications, such as stones passing into the ducts and causing infection or pancreatitis. After the gallbladder is removed, your liver will still make bile to aid digestion.     If youre pregnant  Hormone changes during pregnancy can make bile more likely to form stones. If your gallbladder needs to be removed, your doctor will talk with you about the timing for surgery. In some cases, it can be delayed until after childbirth. In others, you may have surgery during pregnancy. This helps protect you and your babys health.   Date Last Reviewed:  3/15/2014  © 6014-5023 [employer]. [address] [place] [address], [place] [address]. All rights reserved. This information is not intended as a substitute for professional medical care. Always follow your healthcare professional's instructions.        Having Laparoscopic Cholecystectomy  Small incisions are made in your belly (abdomen). The scope is put through one of the incisions. Surgical tools are put through other incisions.  Small clips are used to close off the connection between the gallbladder and the bile duct. The gallbladder is then detached from the liver.  The gallbladder is removed through one of the incisions. Bile still flows from the liver to the small intestine.  When the surgery is done, all tools are removed. Incisions are closed with stitches (sutures) or staples. Sometimes, a laparoscopic surgery may need to be changed to an open surgery. This method uses one large incision. This change may happen because of scar tissue, unusual anatomy, or for some other reason.     Possible incision sites.   Youve had painful attacks caused by gallstones. Because of this, you are having surgery to remove your gallbladder. This is called cholecystectomy. A method called laparoscopy will be used. This allows surgery to be done through a few small cuts (incisions).  Before your surgery  · Tell your provider what medicines you take. This includes prescription medicines, over-the-counter medicines, street drugs, herbs, vitamins, and other supplements. Be sure to mention if you take prescription blood thinners. This includes warfarin, clopidogrel, ibuprofen, and aspirin.  · If you drink alcohol, tell your provider how much you drink. This is very important if you are a heavy drinker or have had alcohol withdrawal symptoms in the past. Alcohol withdrawal can be dangerous. But the symptoms can be safely managed if your healthcare provider knows your alcohol history.  · Have any tests your provider asks for,  such as blood tests.  · Dont eat or drink after midnight the night before your surgery. This includes water, coffee, and mints.  The day of surgery  · Your provider may have you take your normal medicine with a sip of water. Check with your provider.   When you arrive, you will prepare for surgery:  · An IV (intravenous) line will be put into a vein in your arm or hand. This gives you fluids and medicine.  · An anesthesiologist will talk with you about anesthesia. This is medicine used to prevent pain. You will receive general anesthesia. This puts you into a deep sleep-like state through the procedure.  During laparoscopic surgery  For this surgery, a thin tube with a tiny camera is used. This is called a laparoscope. The scope sends images from inside your body to a video screen. This lets the surgeon view and work on your gallbladder:  · Small incisions are made in your belly (abdomen). The scope is put through one of the incisions. Surgical tools are put through other incisions.  · Small clips are used to close off the connection between the gallbladder and the bile duct. The gallbladder is then detached from the liver.  · The gallbladder is removed through one of the incisions. Bile still flows from the liver to the small intestine.  · When the surgery is done, all tools are removed. Incisions are closed with stitches (sutures) or staples. Sometimes, a laparoscopic surgery may need to be changed to an open surgery. This method uses one large incision. This change may happen because of scar tissue, unusual anatomy, or for some other reason.  After surgery  You will be sent to a room to wake up from the anesthesia. You will likely go home the same day. In some cases, an overnight stay is needed. When you are released to go home, have a family member or friend ready to drive you.  Risks and possible complications of gallbladder surgery  All surgeries have risks. The risks of gallbladder surgery  include:  · Bleeding  · Infection  · Injury to the common bile duct or nearby organs  · Blood clots in the legs  · Bile leaks  · Hernia at incision site  · Pneumonia   Date Last Reviewed: 7/1/2016 © 2000-2016 [employer]. [address] [place] [address], [place] [address]. All rights reserved. This information is not intended as a substitute for professional medical care. Always follow your healthcare professional's instructions.        Losartan tablets  What is this medicine?  LOSARTAN (raegan AMRIK tan) is used to treat high blood pressure and to reduce the risk of stroke in certain patients. This drug also slows the progression of kidney disease in patients with diabetes.  How should I use this medicine?  Take this medicine by mouth with a glass of water. Follow the directions on the prescription label. This medicine can be taken with or without food. Take your doses at regular intervals. Do not take your medicine more often than directed.  Talk to your pediatrician regarding the use of this medicine in children. Special care may be needed.  What side effects may I notice from receiving this medicine?  Side effects that you should report to your doctor or health care professional as soon as possible:  · confusion, dizziness, light headedness or fainting spells  · decreased amount of urine passed  · difficulty breathing or swallowing, hoarseness, or tightening of the throat  · fast or irregular heart beat, palpitations, or chest pain  · skin rash, itching  · swelling of your face, lips, tongue, hands, or feet  Side effects that usually do not require medical attention (report to your doctor or health care professional if they continue or are bothersome):  · cough  · decreased sexual function or desire  · headache  · nasal congestion or stuffiness  · nausea or stomach pain  · sore or cramping muscles  What may interact with this medicine?  · blood pressure medicines  · diuretics, especially triamterene,  spironolactone, or amiloride  · fluconazole  · NSAIDs, medicines for pain and inflammation, like ibuprofen or naproxen  · potassium salts or potassium supplements  · rifampin  What if I miss a dose?  If you miss a dose, take it as soon as you can. If it is almost time for your next dose, take only that dose. Do not take double or extra doses.  Where should I keep my medicine?  Keep out of the reach of children.  Store at room temperature between 15 and 30 degrees C (59 and 86 degrees F). Protect from light. Keep container tightly closed. Throw away any unused medicine after the expiration date.  What should I tell my health care provider before I take this medicine?  They need to know if you have any of these conditions:  · heart failure  · kidney or liver disease  · an unusual or allergic reaction to losartan, other medicines, foods, dyes, or preservatives  · pregnant or trying to get pregnant  · breast-feeding  What should I watch for while using this medicine?  Visit your doctor or health care professional for regular checks on your progress. Check your blood pressure as directed. Ask your doctor or health care professional what your blood pressure should be and when you should contact him or her. Call your doctor or health care professional if you notice an irregular or fast heart beat.  Women should inform their doctor if they wish to become pregnant or think they might be pregnant. There is a potential for serious side effects to an unborn child, particularly in the second or third trimester. Talk to your health care professional or pharmacist for more information.  You may get drowsy or dizzy. Do not drive, use machinery, or do anything that needs mental alertness until you know how this drug affects you. Do not stand or sit up quickly, especially if you are an older patient. This reduces the risk of dizzy or fainting spells. Alcohol can make you more drowsy and dizzy. Avoid alcoholic drinks.  Avoid salt  substitutes unless you are told otherwise by your doctor or health care professional.  Do not treat yourself for coughs, colds, or pain while you are taking this medicine without asking your doctor or health care professional for advice. Some ingredients may increase your blood pressure.  Date Last Reviewed:   NOTE:This sheet is a summary. It may not cover all possible information. If you have questions about this medicine, talk to your doctor, pharmacist, or health care provider. Copyright© 2016 [employer]        Taking Your Blood Pressure  Blood pressure is the force of blood against the artery wall as it moves from the heart through the blood vessels. You can take your own blood pressure reading using a digital monitor. Take readings as often as your healthcare provider instructs. Take each reading at the same time of day.  Step 1. Relax    · Take your blood pressure at the same time every day, such as in the morning or evening, or at the time your healthcare provider recommends.  · Wait at least a half-hour after smoking, eating, drinking caffeinated beverages, or exercising.  · Sit comfortably at a table with both feet on the floor. Do not cross your legs or feet. Place the monitor near you.  · Rest for a few minutes before you begin.  Step 2. Wrap the cuff    · Place your arm on the table, palm up. Your arm should be at the level of your heart. Wrap the cuff around your upper arm, just above your elbow. Its best done on bare skin, not over clothing. Most cuffs will indicate where the brachial artery (the blood vessel in the middle of the arm at the inner side of the elbow) should line up with the cuff. Look in your monitor's instruction booklet for an illustration. You can also bring your cuff to your healthcare provider and have them show you how to correctly place the cuff.  · Make sure your cuff fits. If it doesnt wrap around your upper arm, order a larger cuff.  Step 3. Inflate the cuff    · Pump the  cuff until the scale reads 160. If you have a self-inflating cuff, push the button that starts the pump.  · The cuff will tighten, then loosen.  · The numbers will change. When they stop changing, your blood pressure reading will appear.  · Take 2 or 3 readings one minute apart.  Step 4. Write down the results of each reading    · Write down your blood pressure numbers for each reading. Note the date and time. Keep your results in one place, such as a notebook. Even if your monitor has a built-in memory, keep a hard copy of the readings.  · Remove the cuff from your arm. Turn off the machine.  · Share your blood pressure records with your healthcare providers at each visit.  Date Last Reviewed: 4/27/2016 © 2000-2016 [employer]. [address] [place] [address], [place] [address]. All rights reserved. This information is not intended as a substitute for professional medical care. Always follow your healthcare professional's instructions.        Controlling High Blood Pressure  High blood pressure (hypertension) is often called the silent killer. This is because many people who have it dont know it. High blood pressure is defined as 140/90 mm Hg or higher. Know your blood pressure and remember to check it regularly. Doing so can save your life. Here are some things you can do to help control your blood pressure.    Choose heart-healthy foods  · Select low-salt, low-fat foods. Limit sodium intake to 2,400 mg per day or the amount suggested by your healthcare provider.  · Limit canned, dried, cured, packaged, and fast foods. These can contain a lot of salt.  · Eat 8 to 10 servings of fruits and vegetables every day.  · Choose lean meats, fish, or chicken.  · Eat whole-grain pasta, brown rice, and beans.  · Eat 2 to 3 servings of low-fat or fat-free dairy products.  · Ask your doctor about the DASH eating plan. This plan helps reduce blood pressure.  · When you go to a restaurant, ask that your meal be prepared  with no added salt.  Maintain a healthy weight  · Ask your healthcare provider how many calories to eat a day. Then stick to that number.  · Ask your healthcare provider what weight range is healthiest for you. If you are overweight, a weight loss of only 3% to 5% of your body weight can help lower blood pressure. Generally, a good weight loss goal is to lose 10% of your body weight in a year.  · Limit snacks and sweets.  · Get regular exercise.  Get up and get active  · Choose activities you enjoy. Find ones you can do with friends or family. This includes bicycling, dancing, walking, and jogging.  · Park farther away from building entrances.  · Use stairs instead of the elevator.  · When you can, walk or bike instead of driving.  · [place] leaves, garden, or do household repairs.  · Be active at a moderate to vigorous level of physical activity for at least 40 minutes for a minimum of 3 to 4 days a week.   Manage stress  · Make time to relax and enjoy life. Find time to laugh.  · Communicate your concerns with your loved ones and your healthcare provider.  · Visit with family and friends, and keep up with hobbies.  Limit alcohol and quit smoking  · Men should have no more than 2 drinks per day.  · Women should have no more than 1 drink per day.  · Talk with your healthcare provider about quitting smoking. Smoking significantly increases your risk for heart disease and stroke. Ask your healthcare provider about community smoking cessation programs and other options.  Medicines  If lifestyle changes arent enough, your healthcare provider may prescribe high blood pressure medicine. Take all medicines as prescribed. If you have any questions about your medicines, ask your healthcare provider before stopping or changing them.   Date Last Reviewed: 4/27/2016  © 4542-2083 [employer]. [address] [place] [address], [place] [address]. All rights reserved. This information is not intended as a substitute for  professional medical care. Always follow your healthcare professional's instructions.        High Blood Pressure, New, Begin Treatment  Your blood pressure was high enough today to start treatment with medicines. Often health care providers dont know what causes high blood pressure (hypertension). But it can be controlled with lifestyle changes and medicines. High blood pressure usually has no symptoms. But it can sometimes cause headache, dizziness, blurred vision, a rushing sound in your ears, chest pain, or shortness of breath. But even without symptoms, high blood pressure thats not treated raises your risk for heart attack and stroke. High blood pressure is a serious health risk and shouldnt be ignored.    A blood pressure reading is made up of two numbers: a higher number over a lower number. The top number is the systolic pressure. The bottom number is the diastolic pressure. A normal blood pressure is less than 120 over less than 80.  High blood pressure is when either the top number is 140 or higher, or the bottom number is 90 or higher. This must be the result when taking your blood pressure a number of times. The blood pressures between normal and high are called prehypertension.  Home care  If you have high blood pressure, you should do what is listed below to lower your blood pressure. If you are taking medicines for high blood pressure, these methods may reduce or end your need for medicines in the future.  · Begin a weight-loss program if you are overweight.  · Cut back on how much salt you get in your diet. Heres how to do this:  ¨ Dont eat foods that have a lot of salt. These include olives, pickles, smoked meats, and salted potato chips.  ¨ Dont add salt to your food at the table.  ¨ Use only small amounts of salt when cooking.  · Begin an exercise program. Talk with your health care provider about the type of exercise program that would be best for you. It doesn't have to be hard. Even brisk  walking for 20 minutes 3 times a week is a good form of exercise.  · Dont take medicines that have heart stimulants. This includes many cold and sinus decongestant pills and sprays, as well as diet pills. Check the warnings about hypertension on the label. Stimulants such as amphetamine or cocaine could be lethal for someone with high blood pressure. Never take these.  · Limit how much caffeine you get in your diet. Switch to caffeine-free products.  · Stop smoking. If you are a long-time smoker, this can be hard. Enroll in a stop-smoking program to make it more likely that you will quit for good.  · Learn how to handle stress. This is an important part of any program to lower blood pressure. Learn about relaxation methods like meditation, yoga, or biofeedback.  · If your provider prescribed medicines, take them exactly as directed. Missing doses may cause your blood pressure get out of control.  · Consider buying an automatic blood pressure machine. You can get one of these at most pharmacies. Use this to watch your blood pressure at home. Give the results to your provider.  Follow-up care  Because a new blood pressure medicine was started today, its important that you have your blood pressure rechecked. This is to make sure that the medicine is working and that you have no serious side effects. Unless told otherwise, follow up with your health care provider or this facility within the next 3 days.  When to seek medical advice  Call your health care provider right away if any of these occur:  · Chest pain or shortness of breath  · Severe headache  · Throbbing or rushing sound in the ears  · Nosebleed  · Sudden severe pain in your belly (abdomen)  · Extreme drowsiness, confusion, or fainting  · Dizziness or dizziness with a spinning sensation (vertigo)  · Weakness of an arm or leg or one side of the face  · You have problems speaking or seeing   Date Last Reviewed: 11/25/2014  © 9296-5167 [employer],  [employer]. [address] [place] [address]. All rights reserved. This information is not intended as a substitute for professional medical care. Always follow your healthcare professional's instructions.        Low-Salt Choices  Eating salt (sodium) can make your body retain too much water. Excess water makes your heart work harder. Canned, packaged, and frozen foods are easy to prepare, but they are often high in sodium. Here are some ideas for low-salt foods you can easily prepare yourself.    For breakfast  · Fruit or 100% fruit juice  · Whole-wheat bread or an English muffin. Compare sodium content on labels.  · Low-fat milk or yogurt  · Unsalted eggs  · Shredded wheat  · Corn tortillas  · Unsalted steamed rice  · Regular (not instant) hot cereal, made without salt  Stay away from:  · Sausage, joseph, and ham  · Flour tortillas  · Packaged muffins, pancakes, and biscuits  · Instant hot cereals  · Cottage cheese  For lunch and dinner  · Fresh fish, chicken, turkey, or meat--baked, broiled, or roasted without salt  · Dry beans, cooked without salt  · Tofu, stir-fried without salt  · Unsalted fresh fruit and vegetables, or frozen or canned fruit and vegetables with no added salt  Stay away from:  · Lunch or deli meat that is cured or smoked  · Cheese  · Tomato juice and catsup  · Canned vegetables, soups, and fish not labeled as no-salt-added or reduced sodium  · Packaged gravies and sauces  · Olives, pickles, and relish  · Bottled salad dressings  For snacks and desserts  · Yogurt  · Unsalted, air popped popcorn  · Unsalted nuts or seeds  Stay away from:  · Pies and cakes  · Packaged dessert mixes  · Pizza  · Canned and packaged puddings  · Pretzels, chips, crackers, and nuts--unless the label says unsalted  Date Last Reviewed: 6/17/2015  © 7713-4644 [employer]. [address] [place] [address]. All rights reserved. This information is not intended as a substitute for professional  medical care. Always follow your healthcare professional's instructions.        Prevention Guidelines, Women Ages 40 to 49  Screening tests and vaccines are an important part of managing your health. Health counseling is essential, too. Below are guidelines for these, for women ages 40 to 49. Talk with your healthcare provider to make sure youre up-to-date on what you need.  Screening Who needs it How often   Type 2 diabetes or prediabetes All adults beginning at age 45 and adults without symptoms at any age who are overweight or obese and have 1 or more additional risk factors for diabetes At least every 3 years   Alcohol misuse All women in this age group At routine exams   Blood pressure All women in this age group Every 2 years if your blood pressure is less than 120/80 mm Hg; yearly if your systolic blood pressure is 120 to 139 mm Hg, or your diastolic blood pressure reading is 80 to 89 mm Hg   Breast cancer All women in this age group Yearly mammogram and clinical breast exam2  Each woman should make her own decision. If a woman decides to have mammograms before age 50, they should be done every 2 years.3   Cervical cancer All women in this age group, except women who have had a complete hysterectomy Pap test every 3 years or Pap test plus human papilloma virus (HPV) test every 5 years   Chlamydia Women at increased risk for infection At routine exams if you're at risk or have symptoms   Depression All women in this age group At routine exams   Gonorrhea Sexually active women at increased risk for infection At routine exams   Hepatitis C Anyone at increased risk; 1 time for those born between 1945 and 1965 At routine exams   High cholesterol or triglycerides All women ages 45 and older who are at risk for coronary artery disease; younger women, talk with your healthcare provider At least every 5 years   HIV All women At routine exams   Obesity All women in this age group At routine exams   Syphilis Women at  increased risk for infection - talk with your healthcare provider At routine exams   Tuberculosis Women at increased risk for infection - talk with your healthcare provider Ask your healthcare provider   Vision All women in this age group Complete exam at age 40 and eye exams every 2 to 4 years. If you have a chronic disease, ask your healthcare provider how often you should have your eyes examined.4   Vaccine Who needs it How often   Chickenpox (varicella) All women in this age group who have no record of this infection or vaccine 2 doses; the second dose should be given at least 4 weeks after the first dose   Hepatitis A Women at increased risk for infection - talk with your healthcare provider 2 doses given 6 months apart   Hepatitis B Women at increased risk for infection - talk with your healthcare provider 3 doses over 6 months; second dose should be given 1 month after the first dose; the third dose should be given at least 2 months after the second dose and at least 4 months after the first dose   Haemophilus influenzae Type B (HIB) Women at increased risk 1 to 3 doses   Influenza (flu) All women in this age group Once a year   Measles, mumps, rubella (MMR) All women in this age group who have no record of these infections or vaccines 1 or 2 doses   Meningococcal Women at increased risk for infection - talk with your healthcare provider 1 or more doses   Pneumococcal conjugate vaccine (PCV13) and pneumococcal polysaccharide vaccine (PPSV23) Women at increased risk for infection - talk with your healthcare provider 1 or 2 doses   Tetanus/diphtheria/pertussis (Td/Tdap) booster All women in this age group A one-time dose of Tdap instead of a Td booster after age 18, then Td every 10 years   Counseling Who needs it How often   BRCA gene mutation testing for breast and ovarian cancer susceptibility Women with increased risk for having gene mutation When your risk is known   Breast cancer and chemoprevention Women  at high risk for breast cancer When your risk is known   Diet and exercise Women who are overweight or obese When diagnosed, and then at routine exams   Domestic violence Women at the age in which they are able to have children At routine exams   Sexually transmitted infection prevention Women at increased risk for infection - talk with your healthcare provider At routine exams   Use of tobacco and the health effects it can cause All women in this age group Every exam   1[facility] Diabetes Association  2American Cancer Society  3[place].S. Preventive Services Task Force  4[facility] [employer]  Date Last Reviewed: 8/27/2015  © 7770-5408 [employer]. [address] [place] [address]. All rights reserved. This information is not intended as a substitute for professional medical care. Always follow your healthcare professional's instructions.

## 2017-05-01 NOTE — PROGRESS NOTES
"Subjective:       Patient ID: Camila Black is a 47 y.o. female.    Chief Complaint: Annual Exam    HPI Comments: Annual exam    Recall gastric bypass 2007.  She had been in the 380 range before surgery, lost about 140 # and has kept it off, but regained 240 to 280#; BMI still > 40.    Struggling with orthopedic issues which have limited her ability to exercise, also getting rashes and chafing from exercise so pretty much has stopped exercising.  Is trying to boost her metabolism.  Has not seen Bariatric team for year- discussed.    Dx with ARPIT 2007 ("significant; CPAP 13 recommended) prior to surgery but did not pursue testing- reviewed again. Some daytime fatigue.    Slight headache on occasion.  One day she thinks she had a migraine.  Says headaches occur less than weekly, maybe twice a month.    No menses for 7 months.  Recall ablation 2015.  Needs to see GYN and have a mammogram.    Abdominal pain 12/15 resolved.  Normal EGD 2/16.    Patient Active Problem List:     B12 deficiency anemia     Mild vitamin D deficiency     Unspecified hypertrophic and atrophic condition of skin     Benign neoplasm of scalp and skin of neck     Other B-complex deficiencies     Varicose veins of lower extremities with other complications     Iron deficiency     Morbid obesity with BMI of 40.0-44.9, adult     S/P endometrial ablation     Primary localized osteoarthrosis, lower leg     ARPIT (obstructive sleep apnea)     Gastroesophageal reflux disease without esophagitis     S/P gastric bypass     Gallbladder sludge              Review of Systems   Constitutional: Positive for unexpected weight change. Negative for activity change, appetite change, chills, fatigue and fever.   HENT: Negative for ear discharge, nosebleeds, sinus pressure and sore throat.    Respiratory: Positive for apnea. Negative for cough, chest tightness, shortness of breath and wheezing.    Cardiovascular: Negative for chest pain, palpitations and leg " swelling.   Gastrointestinal: Negative for abdominal distention, abdominal pain, anal bleeding, blood in stool, constipation, diarrhea, nausea and vomiting.   Genitourinary: Negative for dysuria, frequency, hematuria and vaginal bleeding.   Musculoskeletal: Positive for arthralgias. Negative for gait problem, joint swelling and myalgias.   Skin: Negative for rash.   Neurological: Negative for dizziness, tremors, weakness, light-headedness and headaches.   Hematological: Negative for adenopathy. Does not bruise/bleed easily.   Psychiatric/Behavioral: Negative for confusion, hallucinations, sleep disturbance and suicidal ideas.       Objective:      Physical Exam   Constitutional: She is oriented to person, place, and time. She appears well-developed and well-nourished.   HENT:   Head: Normocephalic and atraumatic.   Right Ear: External ear normal.   Left Ear: External ear normal.   Nose: Nose normal.   Mouth/Throat: Oropharynx is clear and moist. No oropharyngeal exudate.   Eyes: Conjunctivae and EOM are normal. No scleral icterus.   Neck: Normal range of motion. Neck supple. No JVD present. No thyromegaly present.   Cardiovascular: Normal rate, regular rhythm, normal heart sounds and intact distal pulses.  Exam reveals no gallop.    No murmur heard.  Pulmonary/Chest: Effort normal and breath sounds normal. No respiratory distress. She has no wheezes. She exhibits no tenderness.   Abdominal: Soft. Bowel sounds are normal. She exhibits no distension and no mass. There is no tenderness. There is no rebound and no guarding.   Musculoskeletal: Normal range of motion. She exhibits no edema or tenderness.   Lymphadenopathy:     She has no cervical adenopathy.   Neurological: She is alert and oriented to person, place, and time. She displays normal reflexes. No cranial nerve deficit. Coordination normal.   Skin: Skin is warm. No rash noted. No erythema.   Psychiatric: She has a normal mood and affect. Her behavior is  normal. Judgment and thought content normal.   Nursing note and vitals reviewed.      Assessment:       1. Annual physical exam    2. ARPIT (obstructive sleep apnea)    3. S/P gastric bypass    4. Morbid obesity with BMI of 40.0-44.9, adult    5. Headache, unspecified headache type    6. Other screening mammogram    7. Gastroesophageal reflux disease: normal EGD 2/16    8. Iron deficiency    9. Mild vitamin D deficiency    10. B12 deficiency    11. Benign neoplasm of scalp and skin of neck    12. Gallbladder sludge: see ultrasound 12/15    13. S/P endometrial ablation    14. Urinary frequency    15. Essential hypertension        Plan:         Annual physical exam  -     CBC auto differential; Future; Expected date: 5/1/17  -     Comprehensive metabolic panel; Future; Expected date: 5/1/17  -     Hemoglobin A1c; Future; Expected date: 5/1/17  -     Iron and TIBC; Future; Expected date: 5/1/17  -     Ferritin; Future; Expected date: 5/1/17  -     TSH; Future; Expected date: 5/1/17  -     Vitamin B12; Future; Expected date: 5/1/17  -     Vitamin D; Future; Expected date: 5/1/17  -     Lipid panel; Future; Expected date: 5/1/17  -     Ambulatory consult to Gynecology    ARPIT (obstructive sleep apnea)  -     Ambulatory consult to Sleep Disorders    S/P gastric bypass: bariatric follow up    Morbid obesity with BMI of 40.0-44.9, adult  -     Ambulatory consult to Bariatric Surgery    Headache, unspecified headache type discusse may be secondary to her ARPIT.  Address sleep apnea, check labs.   May need Neurology assessment depending on findings    Other screening mammogram  -     Mammo Digital Screening Bilat with CAD; Future; Expected date: 5/1/17    Gastroesophageal reflux disease: normal EGD 2/16.  No alarm sx currently    Iron deficiency:labs and review    Mild vitamin D deficiency: labs and review    B12 deficiency: labs and review    Benign neoplasm of scalp and skin of neck: DERM 11/16 and ongoing    Gallbladder sludge:  see ultrasound 12/15: reviewed.  Denies sx    S/P endometrial ablation: GYN follow up    Urinary frequency  -     Urinalysis  -     Urine culture    Essential hypertension  -     X-Ray Chest PA And Lateral; Future; Expected date: 5/1/17  -     EKG 12-lead: NSR, no ischemia  -     losartan (COZAAR) 50 MG tablet; Take 1 tablet (50 mg total) by mouth once daily.  Dispense: 90 tablet; Refill: 3    I will review all studies and determine further tx depending on findings  1 month follow up

## 2017-05-02 LAB — BACTERIA UR CULT: NORMAL

## 2017-05-03 ENCOUNTER — LAB VISIT (OUTPATIENT)
Dept: LAB | Facility: HOSPITAL | Age: 47
End: 2017-05-03
Attending: INTERNAL MEDICINE
Payer: COMMERCIAL

## 2017-05-03 DIAGNOSIS — Z00.00 ANNUAL PHYSICAL EXAM: ICD-10-CM

## 2017-05-03 LAB
25(OH)D3+25(OH)D2 SERPL-MCNC: 30 NG/ML
ALBUMIN SERPL BCP-MCNC: 3.3 G/DL
ALP SERPL-CCNC: 85 U/L
ALT SERPL W/O P-5'-P-CCNC: 13 U/L
ANION GAP SERPL CALC-SCNC: 8 MMOL/L
AST SERPL-CCNC: 18 U/L
BASOPHILS # BLD AUTO: 0.03 K/UL
BASOPHILS NFR BLD: 0.5 %
BILIRUB SERPL-MCNC: 1.3 MG/DL
BUN SERPL-MCNC: 13 MG/DL
CALCIUM SERPL-MCNC: 9.1 MG/DL
CHLORIDE SERPL-SCNC: 105 MMOL/L
CHOLEST/HDLC SERPL: 2.9 {RATIO}
CO2 SERPL-SCNC: 25 MMOL/L
CREAT SERPL-MCNC: 0.8 MG/DL
DIFFERENTIAL METHOD: ABNORMAL
EOSINOPHIL # BLD AUTO: 0.1 K/UL
EOSINOPHIL NFR BLD: 2 %
ERYTHROCYTE [DISTWIDTH] IN BLOOD BY AUTOMATED COUNT: 13.8 %
EST. GFR  (AFRICAN AMERICAN): >60 ML/MIN/1.73 M^2
EST. GFR  (NON AFRICAN AMERICAN): >60 ML/MIN/1.73 M^2
FERRITIN SERPL-MCNC: 51 NG/ML
GLUCOSE SERPL-MCNC: 79 MG/DL
HCT VFR BLD AUTO: 39.2 %
HDL/CHOLESTEROL RATIO: 34.7 %
HDLC SERPL-MCNC: 144 MG/DL
HDLC SERPL-MCNC: 50 MG/DL
HGB BLD-MCNC: 12.7 G/DL
IRON SERPL-MCNC: 121 UG/DL
LDLC SERPL CALC-MCNC: 82.8 MG/DL
LYMPHOCYTES # BLD AUTO: 2.8 K/UL
LYMPHOCYTES NFR BLD: 43 %
MCH RBC QN AUTO: 31.4 PG
MCHC RBC AUTO-ENTMCNC: 32.4 %
MCV RBC AUTO: 97 FL
MONOCYTES # BLD AUTO: 0.5 K/UL
MONOCYTES NFR BLD: 8.2 %
NEUTROPHILS # BLD AUTO: 3 K/UL
NEUTROPHILS NFR BLD: 46.3 %
NONHDLC SERPL-MCNC: 94 MG/DL
PLATELET # BLD AUTO: 232 K/UL
PMV BLD AUTO: 9.8 FL
POTASSIUM SERPL-SCNC: 4.5 MMOL/L
PROT SERPL-MCNC: 7.1 G/DL
RBC # BLD AUTO: 4.05 M/UL
SATURATED IRON: 41 %
SODIUM SERPL-SCNC: 138 MMOL/L
TOTAL IRON BINDING CAPACITY: 292 UG/DL
TRANSFERRIN SERPL-MCNC: 197 MG/DL
TRIGL SERPL-MCNC: 56 MG/DL
TSH SERPL DL<=0.005 MIU/L-ACNC: 1.51 UIU/ML
VIT B12 SERPL-MCNC: 402 PG/ML
WBC # BLD AUTO: 6.56 K/UL

## 2017-05-03 PROCEDURE — 83036 HEMOGLOBIN GLYCOSYLATED A1C: CPT

## 2017-05-03 PROCEDURE — 85025 COMPLETE CBC W/AUTO DIFF WBC: CPT

## 2017-05-03 PROCEDURE — 84443 ASSAY THYROID STIM HORMONE: CPT

## 2017-05-03 PROCEDURE — 83540 ASSAY OF IRON: CPT

## 2017-05-03 PROCEDURE — 80053 COMPREHEN METABOLIC PANEL: CPT

## 2017-05-03 PROCEDURE — 80061 LIPID PANEL: CPT

## 2017-05-03 PROCEDURE — 82728 ASSAY OF FERRITIN: CPT

## 2017-05-03 PROCEDURE — 82306 VITAMIN D 25 HYDROXY: CPT

## 2017-05-03 PROCEDURE — 36415 COLL VENOUS BLD VENIPUNCTURE: CPT

## 2017-05-03 PROCEDURE — 82607 VITAMIN B-12: CPT

## 2017-05-04 ENCOUNTER — TELEPHONE (OUTPATIENT)
Dept: INTERNAL MEDICINE | Facility: CLINIC | Age: 47
End: 2017-05-04

## 2017-05-04 ENCOUNTER — PATIENT MESSAGE (OUTPATIENT)
Dept: INTERNAL MEDICINE | Facility: CLINIC | Age: 47
End: 2017-05-04

## 2017-05-04 DIAGNOSIS — R17 ELEVATED BILIRUBIN: Primary | ICD-10-CM

## 2017-05-04 LAB
ESTIMATED AVG GLUCOSE: 97 MG/DL
HBA1C MFR BLD HPLC: 5 %

## 2017-05-04 NOTE — TELEPHONE ENCOUNTER
Please let her know the chest x-ray, EKG, urine and labs are all acceptable    B12 slightly low and she needs to be taking B12 1224-2307 µg daily over-the-counter    One of her liver labs was slightly out of range which is probably of no significance but I would like to repeat labs a few days before she comes back to see me in June, orders are in.    Keep all other appointments as we discussed.  Thank you

## 2017-05-05 NOTE — TELEPHONE ENCOUNTER
Spoke to pt and advised of all results and recommentations. Pt will complete repeat labs on 5/29/17 at [facility]. Pt has GYN appt scheduled on that day.

## 2017-05-10 ENCOUNTER — TELEPHONE (OUTPATIENT)
Dept: SPORTS MEDICINE | Facility: CLINIC | Age: 47
End: 2017-05-10

## 2017-05-10 ENCOUNTER — OFFICE VISIT (OUTPATIENT)
Dept: SPORTS MEDICINE | Facility: CLINIC | Age: 47
End: 2017-05-10
Payer: COMMERCIAL

## 2017-05-10 ENCOUNTER — PATIENT MESSAGE (OUTPATIENT)
Dept: SPORTS MEDICINE | Facility: CLINIC | Age: 47
End: 2017-05-10

## 2017-05-10 VITALS
WEIGHT: 284 LBS | BODY MASS INDEX: 44.57 KG/M2 | DIASTOLIC BLOOD PRESSURE: 81 MMHG | SYSTOLIC BLOOD PRESSURE: 123 MMHG | HEIGHT: 67 IN | HEART RATE: 61 BPM

## 2017-05-10 DIAGNOSIS — S76.311D RIGHT HAMSTRING MUSCLE STRAIN, SUBSEQUENT ENCOUNTER: Primary | ICD-10-CM

## 2017-05-10 DIAGNOSIS — M21.061 GENU VALGUM, RIGHT: ICD-10-CM

## 2017-05-10 DIAGNOSIS — M17.11 PRIMARY OSTEOARTHRITIS OF RIGHT KNEE: ICD-10-CM

## 2017-05-10 PROCEDURE — 97110 THERAPEUTIC EXERCISES: CPT | Mod: S$GLB,,, | Performed by: PHYSICIAN ASSISTANT

## 2017-05-10 PROCEDURE — 99214 OFFICE O/P EST MOD 30 MIN: CPT | Mod: 25,S$GLB,, | Performed by: PHYSICIAN ASSISTANT

## 2017-05-10 PROCEDURE — 99999 PR PBB SHADOW E&M-EST. PATIENT-LVL III: CPT | Mod: PBBFAC,,, | Performed by: PHYSICIAN ASSISTANT

## 2017-05-10 NOTE — MR AVS SNAPSHOT
[facility]  [address]  [facility] [address]  Phone: [phone]                  Camila Black   5/10/2017 1:30 PM   Appointment    Description:  Female : 1970   Provider:  Cecille Gillis PA-C   Department:  [facility]                To Do List           Future Appointments        Provider Department Dept Phone    5/10/2017 1:30 PM Cecille Gillis PA-C [facility] [phone]    2017 9:15 AM MD [facility] - [phone]    2017 10:10 AM APPOINTMENT LAB, [facility] [phone]    2017 10:30 AM Tabitha Pandey MD [facility] - Internal Medicine [phone]    2017 9:15 AM Cecille Gillis PA-C [facility] [phone]      Goals (5 Years of Data)     None      [facility]     [facility] Nurse Care Line -  Assistance  Unless otherwise directed by your provider, please contact [facility], our nurse care line that is available for  assistance.     Registered nurses in the [facility] provide: appointment scheduling, clinical advisement, health education, and other advisory services.  Call: [phone] (toll free)               Medications           Message regarding Medications     Verify the changes and/or additions to your medication regime listed below are the same as discussed with your clinician today.  If any of these changes or additions are incorrect, please notify your healthcare provider.             Verify that the below list of medications is an accurate representation of the medications you are currently taking.  If none reported, the list may be blank. If incorrect, please contact your healthcare provider. Carry this list with you in case of emergency.           Current Medications     cholecalciferol, vitamin D3, 1,000 unit capsule Take 1 capsule (1,000 Units total) by mouth once daily.     cyanocobalamin (VITAMIN B-12) 1000 MCG tablet Take 1 tablet (1,000 mcg total) by mouth once daily.    diclofenac sodium 100 mg 24 hr tablet Take 100 mg by mouth 2 (two) times daily.    econazole nitrate 1 % cream Apply topically once daily. Mix in hydrocortisone cream and aaa qhs    losartan (COZAAR) 50 MG tablet Take 1 tablet (50 mg total) by mouth once daily.    multivitamin capsule Take 1 capsule by mouth once daily.      oxybutynin (DITROPAN XL) 15 MG TR24 TAKE 1 TABLET (15 MG TOTAL) BY MOUTH ONCE DAILY.           Clinical Reference Information           Allergies as of 5/10/2017     No Known Allergies      Immunizations Administered on Date of Encounter - 5/10/2017     None      Language Assistance Services     ATTENTION: Language assistance services are available, free of charge. Please call [phone].      ATENCIÓN: Si clari martinez, tiene a bauer disposición servicios gratuitos de asistencia lingüística. Llame al [phone].     CHÚ Ý: N?u b?n nói Ti?ng Vi?t, có các d?ch v? h? tr? ngôn ng? mi?n phí dành cho b?n. G?i s? [phone].         [facility] complies with applicable Federal civil rights laws and does not discriminate on the basis of race, color, national origin, age, disability, or sex.

## 2017-05-10 NOTE — PROGRESS NOTES
Subjective:          Chief Complaint: Camila Black is a 47 y.o. female who had concerns including Follow-up of the Right Knee.    Pain   Associated symptoms include joint swelling. Pertinent negatives include no abdominal pain, chest pain, chills, congestion, coughing, fever, headaches, myalgias, nausea, numbness, rash, sore throat or vomiting.      Patient presents to clinic for right knee pain. Pain has worsened over the past 2 weeks.She has a Synvisc One injection in 8/11/2016. She reports significant relief of pain with the visco supplementation. She has been taking tramadol  and ibuprofen 800mg prn for pain relief. Pain today is 8/10. Pain is at its worst at 10/10.Pain is worse with walking. Loud clicking while walking. She is not wearing her lateral  brace today and reports it does not help with pain. Surgery is scheduled for iTotal knee replacement on 6/20/2017. Pain is located int he posterior aspect of the knee.    Review of Systems   Constitution: Negative. Negative for chills, fever, weight gain and weight loss.   HENT: Negative for congestion, headaches and sore throat.    Eyes: Negative for blurred vision and double vision.   Cardiovascular: Negative for chest pain, leg swelling and palpitations.   Respiratory: Negative for cough and shortness of breath.    Skin: Negative for itching, poor wound healing and rash.   Musculoskeletal: Positive for joint pain, joint swelling, muscle weakness and stiffness. Negative for back pain and myalgias.   Gastrointestinal: Negative for abdominal pain, constipation, diarrhea, nausea and vomiting.   Genitourinary: Negative.  Negative for frequency and hematuria.   Neurological: Negative for dizziness, numbness, paresthesias and sensory change.   Psychiatric/Behavioral: Negative for altered mental status and depression. The patient is not nervous/anxious.    Allergic/Immunologic: Negative for hives.       Pain Related Questions  Over the past 3 days,  what was your average pain during activity? (I.e. running, jogging, walking, climbing stairs, getting dressed, ect.): 10  Over the past 3 days, what was your highest pain level?: 10  Over the past 3 days, what was your lowest pain level? : 8    Other  How many nights a week are you awakened by your affected body part?: 7  Was the patient's HEIGHT measured or patient reported?: Patient Reported  Was the patient's WEIGHT measured or patient reported?: Measured      Objective:        General: Camila is well-developed, well-nourished, appears stated age, in no acute distress, alert and oriented to time, place and person.     General    Vitals reviewed.  Constitutional: She is oriented to person, place, and time. She appears well-developed and well-nourished. No distress.   HENT:   Head: Normocephalic and atraumatic.   Eyes: EOM are normal.   Neck: Normal range of motion.   Cardiovascular: Normal rate and regular rhythm.    Pulmonary/Chest: Effort normal. No respiratory distress.   Neurological: She is alert and oriented to person, place, and time. She has normal reflexes. No cranial nerve deficit. Coordination normal.   Psychiatric: She has a normal mood and affect. Her behavior is normal. Judgment and thought content normal.     General Musculoskeletal Exam   Gait: normal       Right Knee Exam     Inspection   Erythema: absent  Scars: absent  Swelling: absent  Effusion: effusion  Deformity: deformity  Bruising: absent    Tenderness   The patient is tender to palpation of the medial hamstring (anterior knee pain).    Range of Motion   Extension: 0   Flexion: 90     Tests   Meniscus   Pedro:  Medial - negative Lateral - negative  Ligament Examination Lachman: normal (-1 to 2mm) PCL-Posterior Drawer: normal (0 to 2mm)     MCL - Valgus: normal (0 to 2mm)  LCL - Varus: normalPivot Shift: normal (Equal)Reverse Pivot Shift: normal (Equal)  Patella   Passive Patellar Tilt: neutral  Patellar Glide (quadrants): Lateral - 1    Medial - 2  Patellar Grind: negative    Other   Sensation: normal    Comments:  Severe genu valgum     Tenderness throughout hamstrings at distal insertion.   Pain with resisted Hamstring strength testing    Left Knee Exam     Inspection   Erythema: absent  Scars: absent  Swelling: absent  Effusion: absent  Deformity: deformity  Bruising: absent    Tenderness   The patient is experiencing no tenderness.         Range of Motion   Extension: 0   Left knee flexion: 105.     Tests   Meniscus   Pedro:  Medial - negative Lateral - negative  Stability Lachman: normal (-1 to 2mm) PCL-Posterior Drawer: normal (0 to 2mm)  MCL - Valgus: normal (0 to 2mm)  LCL - Varus: normal (0 to 2mm)Pivot Shift: normal (Equal)Reverse Pivot Shift: normal (Equal)  Patella   Passive Patellar Tilt: neutral  Patellar Glide (Quadrants): Lateral - 1 Medial - 2  Patellar Grind: positive    Other   Sensation: normal    Muscle Strength   Right Lower Extremity   Quadriceps:  4/5   Hamstring:  3/5   Left Lower Extremity   Quadriceps:  4/5   Hamstring:  3/5     Reflexes     Left Side  Quadriceps:  2+  Achilles:  2+    Right Side   Quadriceps:  2+  Achilles:  2+    Vascular Exam     Right Pulses  Dorsalis Pedis:      2+  Posterior Tibial:      2+        Left Pulses  Dorsalis Pedis:      2+  Posterior Tibial:      2+        Edema  Right Lower Leg: absent  Left Lower Leg: absent  RADIOGRAPHS:  Findings:  4 views of the knee, comparison 2016.  Advanced tricompartment DJD right knee joint with genu valgus, less prominent DJD changes left knee joint femoral tibial joint, but advanced DJD both patellofemoral joints.  No fracture, dislocation, joint effusion.  bilateral subcutaneous opacities, varicosities, more prominent right..        Assessment:       Encounter Diagnoses   Name Primary?    Right hamstring muscle strain, subsequent encounter Yes    Genu valgum, right     Primary osteoarthritis of right knee           Plan:         1. Continue  Patellofemoral and Core HEP.    2. IKDC, SF-12 and KOOS was not  filled out today in clinic.     RTC in 1 month with Cecille Gillis PA-C for pre-op appt. Patient will fill out IKDC, SF-12 and KOOS on return.    3. Continue lateral  brace.    4. We reviewed with Camila today, the pathology and natural history of her diagnosis. We have discussed a variety of treatment options including medications, physical therapy and other alternative treatments. I also explained the indications, risks and benefits of surgery. After discussion, Camila decided to proceed with surgery. The decision was made to go forward with:    1. Right knee Conformis iTotal    The details of the surgical procedure were explained, including the location of probable incisions and a description of likely hardware and/or grafts to be used.  The patient understands the likely convalescence after surgery.  Also, we have thoroughly discussed the risks, benefits and alternatives to surgery, including, but not limited to, the risk of infection, joint stiffness, blood clot (including DVT and/or pulmonary embolus), neurologic and vascular injury.  It was explained that, if tissue has been repaired or reconstructed, there is a chance of failure, which may require further management.      All of the patient's questions were answered and informed consent was obtained. The patient will contact us if they have any questions or concerns in the interim.    5. Needs clearance by PCP prior to surgery    6. HEP 88205 - I, instructed and demonstrated a Patellofemoral and hamstring stretching/strengthening HEP. The patient then demonstrated understanding of exercises and proper technique. This program was performed for 12 minutes.                                       Patient questionnaires may have been collected.

## 2017-05-17 ENCOUNTER — PATIENT MESSAGE (OUTPATIENT)
Dept: SPORTS MEDICINE | Facility: CLINIC | Age: 47
End: 2017-05-17

## 2017-05-22 ENCOUNTER — OFFICE VISIT (OUTPATIENT)
Dept: PODIATRY | Facility: CLINIC | Age: 47
End: 2017-05-22
Payer: COMMERCIAL

## 2017-05-22 VITALS
WEIGHT: 281 LBS | HEIGHT: 67 IN | SYSTOLIC BLOOD PRESSURE: 132 MMHG | BODY MASS INDEX: 44.1 KG/M2 | DIASTOLIC BLOOD PRESSURE: 82 MMHG | RESPIRATION RATE: 18 BRPM | HEART RATE: 71 BPM

## 2017-05-22 DIAGNOSIS — M21.42 PES PLANUS OF BOTH FEET: ICD-10-CM

## 2017-05-22 DIAGNOSIS — M21.41 PES PLANUS OF BOTH FEET: ICD-10-CM

## 2017-05-22 DIAGNOSIS — M72.2 PLANTAR FASCIITIS, BILATERAL: Primary | ICD-10-CM

## 2017-05-22 PROCEDURE — 99999 PR PBB SHADOW E&M-EST. PATIENT-LVL III: CPT | Mod: PBBFAC,,, | Performed by: PODIATRIST

## 2017-05-22 PROCEDURE — 20550 NJX 1 TENDON SHEATH/LIGAMENT: CPT | Mod: 50,S$GLB,, | Performed by: PODIATRIST

## 2017-05-22 PROCEDURE — 99213 OFFICE O/P EST LOW 20 MIN: CPT | Mod: 25,S$GLB,, | Performed by: PODIATRIST

## 2017-05-22 RX ORDER — DEXAMETHASONE SODIUM PHOSPHATE 4 MG/ML
4 INJECTION, SOLUTION INTRA-ARTICULAR; INTRALESIONAL; INTRAMUSCULAR; INTRAVENOUS; SOFT TISSUE ONCE
Status: COMPLETED | OUTPATIENT
Start: 2017-05-22 | End: 2017-05-22

## 2017-05-22 RX ORDER — TRIAMCINOLONE ACETONIDE 40 MG/ML
40 INJECTION, SUSPENSION INTRA-ARTICULAR; INTRAMUSCULAR ONCE
Status: COMPLETED | OUTPATIENT
Start: 2017-05-22 | End: 2017-05-22

## 2017-05-22 RX ADMIN — DEXAMETHASONE SODIUM PHOSPHATE 4 MG: 4 INJECTION, SOLUTION INTRA-ARTICULAR; INTRALESIONAL; INTRAMUSCULAR; INTRAVENOUS; SOFT TISSUE at 08:05

## 2017-05-22 RX ADMIN — TRIAMCINOLONE ACETONIDE 40 MG: 40 INJECTION, SUSPENSION INTRA-ARTICULAR; INTRAMUSCULAR at 08:05

## 2017-05-22 NOTE — PROGRESS NOTES
"Subjective:      Patient ID: Camila Black is a 47 y.o. female.    Chief Complaint: PCP (Tabitha Pandey MD 5/01/17); Foot Problem (both feet ); and Heel Pain    Camila is a 47 y.o. female who presents to the clinic complaining of heel pain in both feet, especially with the first step in the morning. The pain is described as Aching and Dull. The onset of the pain was sudden and has worsened slightly over the past several weeks.she reports previous injections () alleviate her pain for quite sometime .    Review of Systems   Constitution: Negative for chills, decreased appetite and fever.   Cardiovascular: Negative for leg swelling.   Musculoskeletal: Negative for arthritis, joint pain (r knee), joint swelling and myalgias.   Gastrointestinal: Negative for nausea and vomiting.   Neurological: Negative for loss of balance, numbness and paresthesias.           Objective:       Vitals:    05/22/17 0735   BP: 132/82   Pulse: 71   Resp: 18   Weight: 127.5 kg (281 lb)   Height: 5' 7" (1.702 m)   PainSc:   6   PainLoc: Foot        Physical Exam   Constitutional: She is oriented to person, place, and time. She appears well-developed and well-nourished.   Cardiovascular:   Pulses:       Dorsalis pedis pulses are 2+ on the right side, and 2+ on the left side.        Posterior tibial pulses are 2+ on the right side, and 2+ on the left side.   Musculoskeletal: She exhibits no edema or tenderness.        Right ankle: Normal.        Left ankle: Normal.        Right foot: There is no swelling, no crepitus and no deformity.        Left foot: There is no swelling, no crepitus and no deformity.   Adequate joint range of motion without pain, limitation, nor crepitation Bilateral feet and ankle joints. Muscle strength is 5/5 in all groups bilaterally.         Pain on palpation of b/l  plantar heel consistent with location of patient's chief complaint. Negative Tinel's sign. No pain with lateral compression of " heels.      Flexible pes planus foot type w/ medial arch collapse and mild gastroc equinus      Lymphadenopathy:   No palpable lymph nodes   Neurological: She is alert and oriented to person, place, and time. She has normal strength.   Skin: Skin is warm, dry and intact. No rash noted. No erythema. Nails show no clubbing.   Psychiatric: She has a normal mood and affect. Her behavior is normal.             Assessment:       Encounter Diagnoses   Name Primary?    Plantar fasciitis, bilateral Yes    Pes planus of both feet          Plan:       Camila was seen today for pcp, foot problem and heel pain.    Diagnoses and all orders for this visit:    Plantar fasciitis, bilateral    Pes planus of both feet    Other orders  -     dexamethasone injection 4 mg; Inject 1 mL (4 mg total) into the muscle once.  -     triamcinolone acetonide injection 40 mg; Inject 1 mL (40 mg total) into the muscle once.      I counseled the patient on her conditions, their implications and medical management.      Discussed different treatment options for heel pain. including conservative and interventional.  I gave written and verbal instructions on heel cord stretching and this was demonstrated for the patient. Patient expressed understanding. Discussed wearing appropriate shoe gear and avoiding flats, slippers, sandals, barefoot, and sockfeet. Recommended arch supports. My recommendation for OTC supports is Spenco polysorb replacement insoles or patient may elect more aggressive treatment with prescription arch supports. We also discussed cortisone injections and NSAID therapy.    Patient instructed on adequate icing techniques. Patient should ice the affected area at least once per day x 10 minutes for 10 days . I advised the  patient that extra icing would also be beneficial to ensure adequate anti inflammatory effect     Stretching handout dispensed to patient. Instructions on adequate stretching reviewed in clinic      Discussed  possible side effects from injection including but NOT limited to discoloration of skin, erosion of soft tissue, and increased likelihood of rupture of a soft tissue structure (ie. Plantar fascia, muscle, tendon, ligament, or capsule in the area of injection). Patient indicates understanding of my explanation, and patient gives verbal consent for injection of affected area. A time out was performed to verify the  correct  patient and site, prior to initiation of procedure.   After sterilizing the area with an alcohol prep, the affected area of the b/l heel  was injected as per MAR  The patient tolerated the injection well and reported comfort to the area     .

## 2017-05-23 DIAGNOSIS — M17.10 PRIMARY LOCALIZED OSTEOARTHROSIS, LOWER LEG, UNSPECIFIED LATERALITY: ICD-10-CM

## 2017-05-23 RX ORDER — DICLOFENAC SODIUM 100 MG/1
100 TABLET, EXTENDED RELEASE ORAL DAILY
Qty: 30 TABLET | Refills: 1 | Status: SHIPPED | OUTPATIENT
Start: 2017-05-23 | End: 2017-07-26

## 2017-05-24 ENCOUNTER — PATIENT MESSAGE (OUTPATIENT)
Dept: ADMINISTRATIVE | Facility: OTHER | Age: 47
End: 2017-05-24

## 2017-05-29 ENCOUNTER — LAB VISIT (OUTPATIENT)
Dept: LAB | Facility: HOSPITAL | Age: 47
End: 2017-05-29
Attending: INTERNAL MEDICINE
Payer: COMMERCIAL

## 2017-05-29 ENCOUNTER — PATIENT MESSAGE (OUTPATIENT)
Dept: INTERNAL MEDICINE | Facility: CLINIC | Age: 47
End: 2017-05-29

## 2017-05-29 DIAGNOSIS — R17 ELEVATED BILIRUBIN: ICD-10-CM

## 2017-05-29 LAB
ALBUMIN SERPL BCP-MCNC: 3.6 G/DL
ALP SERPL-CCNC: 80 U/L
ALT SERPL W/O P-5'-P-CCNC: 14 U/L
AST SERPL-CCNC: 14 U/L
BILIRUB DIRECT SERPL-MCNC: 0.4 MG/DL
BILIRUB SERPL-MCNC: 1 MG/DL
PROT SERPL-MCNC: 7.4 G/DL

## 2017-05-29 PROCEDURE — 36415 COLL VENOUS BLD VENIPUNCTURE: CPT

## 2017-05-29 PROCEDURE — 80076 HEPATIC FUNCTION PANEL: CPT

## 2017-05-30 ENCOUNTER — PATIENT MESSAGE (OUTPATIENT)
Dept: ADMINISTRATIVE | Facility: OTHER | Age: 47
End: 2017-05-30

## 2017-05-31 ENCOUNTER — TELEPHONE (OUTPATIENT)
Dept: SPORTS MEDICINE | Facility: CLINIC | Age: 47
End: 2017-05-31

## 2017-06-02 ENCOUNTER — OFFICE VISIT (OUTPATIENT)
Dept: INTERNAL MEDICINE | Facility: CLINIC | Age: 47
End: 2017-06-02
Payer: COMMERCIAL

## 2017-06-02 VITALS
DIASTOLIC BLOOD PRESSURE: 75 MMHG | WEIGHT: 284.38 LBS | SYSTOLIC BLOOD PRESSURE: 115 MMHG | BODY MASS INDEX: 45.7 KG/M2 | HEIGHT: 66 IN

## 2017-06-02 DIAGNOSIS — Z98.84 S/P GASTRIC BYPASS: ICD-10-CM

## 2017-06-02 DIAGNOSIS — E66.01 MORBID OBESITY WITH BMI OF 40.0-44.9, ADULT: ICD-10-CM

## 2017-06-02 DIAGNOSIS — I10 ESSENTIAL HYPERTENSION: Primary | ICD-10-CM

## 2017-06-02 DIAGNOSIS — E53.8 B12 DEFICIENCY: ICD-10-CM

## 2017-06-02 DIAGNOSIS — G47.33 OSA (OBSTRUCTIVE SLEEP APNEA): ICD-10-CM

## 2017-06-02 PROCEDURE — 99213 OFFICE O/P EST LOW 20 MIN: CPT | Mod: S$GLB,,, | Performed by: INTERNAL MEDICINE

## 2017-06-02 PROCEDURE — 99999 PR PBB SHADOW E&M-EST. PATIENT-LVL II: CPT | Mod: PBBFAC,,, | Performed by: INTERNAL MEDICINE

## 2017-06-02 NOTE — PATIENT INSTRUCTIONS
Taking Your Blood Pressure  Blood pressure is the force of blood against the artery wall as it moves from the heart through the blood vessels. You can take your own blood pressure reading using a digital monitor. Take readings as often as your healthcare provider instructs. Take each reading at the same time of day.  Step 1. Relax    · Take your blood pressure at the same time every day, such as in the morning or evening, or at the time your healthcare provider recommends.  · Wait at least a half-hour after smoking, eating, drinking caffeinated beverages, or exercising.  · Sit comfortably at a table with both feet on the floor. Do not cross your legs or feet. Place the monitor near you.  · Rest for a few minutes before you begin.  Step 2. Wrap the cuff    · Place your arm on the table, palm up. Your arm should be at the level of your heart. Wrap the cuff around your upper arm, just above your elbow. Its best done on bare skin, not over clothing. Most cuffs will indicate where the brachial artery (the blood vessel in the middle of the arm at the inner side of the elbow) should line up with the cuff. Look in your monitor's instruction booklet for an illustration. You can also bring your cuff to your healthcare provider and have them show you how to correctly place the cuff.  · Make sure your cuff fits. If it doesnt wrap around your upper arm, order a larger cuff.  Step 3. Inflate the cuff    · Pump the cuff until the scale reads 160. If you have a self-inflating cuff, push the button that starts the pump.  · The cuff will tighten, then loosen.  · The numbers will change. When they stop changing, your blood pressure reading will appear.  · Take 2 or 3 readings one minute apart.  Step 4. Write down the results of each reading    · Write down your blood pressure numbers for each reading. Note the date and time. Keep your results in one place, such as a notebook. Even if your monitor has a built-in memory, keep a hard  copy of the readings.  · Remove the cuff from your arm. Turn off the machine.  · Share your blood pressure records with your healthcare providers at each visit.  Date Last Reviewed: 4/27/2016  © 1501-4745 [employer] [address] [place] [address], [place] [address]. All rights reserved. This information is not intended as a substitute for professional medical care. Always follow your healthcare professional's instructions.

## 2017-06-02 NOTE — PROGRESS NOTES
Subjective:       Patient ID: Camila Black is a 47 y.o. female.    Chief Complaint: Hypertension    Follow-up of multiple medical issues.    At last visit, BP meds started.  BP has been doing well.  She's trying to exercise a little bit.    Liver labs slightly out of range but have improved.    We'll see GYN in July.    Mammogram, chest x-ray, urinalysis and other blood work acceptable.  She is due for sleep clinic follow-up and bariatric clinic follow-up, both of which she will schedule.  She is planning to have orthopedic surgery.    NEVER SMOKED.  Obesity, ARPIT- never assessed; see above. Gastric bypass 380 # 2007 lost 140# down to 240# now up to around 280.  2 sons Reymundo 1996 and Izaiah 2005 9 years apart.  BCC- follows in DERM. Ablation. GB sludge 2015    Patient Active Problem List:     Mild vitamin D deficiency     Unspecified hypertrophic and atrophic condition of skin     Benign neoplasm of scalp and skin of neck     Varicose veins of lower extremities with other complications     Iron deficiency     Morbid obesity with BMI of 40.0-44.9, adult     S/P endometrial ablation     Primary localized osteoarthrosis, lower leg     ARPIT (obstructive sleep apnea)     Gastroesophageal reflux disease: normal EGD 2/16     S/P gastric bypass     Gallbladder sludge: see ultrasound 12/15     B12 deficiency     Essential hypertension            Review of Systems   Constitutional: Negative for activity change and unexpected weight change.   HENT: Negative for hearing loss, rhinorrhea and trouble swallowing.    Eyes: Negative for discharge and visual disturbance.   Respiratory: Negative for chest tightness and wheezing.         See above, no obvious sx   Cardiovascular: Negative for chest pain and palpitations.   Gastrointestinal: Negative for blood in stool, constipation, diarrhea and vomiting.   Endocrine: Negative for polydipsia and polyuria.   Genitourinary: Negative for difficulty urinating, dysuria,  hematuria and menstrual problem.   Musculoskeletal: Positive for arthralgias and joint swelling. Negative for neck pain.   Neurological: Negative for weakness and headaches.   Psychiatric/Behavioral: Negative for confusion and dysphoric mood.       Objective:      Physical Exam   Constitutional: She is oriented to person, place, and time. She appears well-developed and well-nourished.   HENT:   Head: Normocephalic and atraumatic.   Right Ear: External ear normal.   Left Ear: External ear normal.   Mouth/Throat: Oropharynx is clear and moist.   Eyes: Conjunctivae are normal.   Neck: Normal range of motion. Neck supple. No thyromegaly present.   Cardiovascular: Normal rate, regular rhythm and normal heart sounds.    Pulmonary/Chest: No respiratory distress. She has no wheezes. She has no rales.   Musculoskeletal: She exhibits no edema.   Neurological: She is alert and oriented to person, place, and time.   Skin: Skin is warm and dry. No rash noted. No erythema.       Assessment:       1. Essential hypertension    2. ARPIT (obstructive sleep apnea)    3. Morbid obesity with BMI of 40.0-44.9, adult    4. B12 deficiency    5. S/P gastric bypass        Plan:         Essential hypertension: Continue current medical regimen.  Doing well on losartan 50.  If continues with lower blood pressure readings, can consider decreasing to 25 mg.  Continue with hypertension program digitally    ARPIT (obstructive sleep apnea); sleep clinic follow-up, she will schedule    Morbid obesity with BMI of 40.0-44.9, adult: Exercise as tolerated.  Lifestyle modification and weight loss reviewed.  Bariatric clinic follow-up, she will schedule    B12 deficiency: Continue current regimen    S/P gastric bypass: Bariatric clinic follow-up.  Continue current medical regimen    GYN appointment in July    Annual follow-up, return sooner with problems prior

## 2017-06-06 ENCOUNTER — TELEPHONE (OUTPATIENT)
Dept: SPORTS MEDICINE | Facility: CLINIC | Age: 47
End: 2017-06-06

## 2017-06-08 ENCOUNTER — PATIENT OUTREACH (OUTPATIENT)
Dept: OTHER | Facility: OTHER | Age: 47
End: 2017-06-08

## 2017-06-08 ENCOUNTER — PATIENT MESSAGE (OUTPATIENT)
Dept: SPORTS MEDICINE | Facility: CLINIC | Age: 47
End: 2017-06-08

## 2017-06-08 NOTE — PROGRESS NOTES
Last 5 Patient Entered Redings Current 30 Day Average: 115/77     Recent Readings 7/10/2017 7/9/2017 7/6/2017 7/5/2017 7/1/2017    Systolic BP (mmHg) 138 104 102 100 117    Diastolic BP (mmHg) 81 72 81 62 84    Pulse 63 116 58 66 71        6/08 - LVM.  6/13 - LVM. Sent [email or web address] message  6/20 - LVM.  6/27 - LVM.   7/05 - LVM. Non-compliance letter sent.    7/18   Initial introduction completed with the pt and the role of the health  was explained.   We discussed the following information:  Exercise - Ms. Black will be having knee replacement surgery next week. She plans to resume regular exercise once she is cleared to do so. Ms. Black is aware that she is eligible for a free 30 day membership to [facility].  Diet - Patient does not maintain a low sodium diet. Reviewed DASH, salt free seasonings, and foods high in sodium.    Resources on diet and exercise were sent.     Pt aware that I am not available for emergencies and to call 911 or [facility] on call if one arises.  Pt aware of the importance of medication adherence.  Pt aware of the importance of diet and exercise.  Pt aware that his sodium intake should be no more than 2000mg per day.  Pt aware that the recommended physical activity each week should be about 30 minutes per day at least 5 times per week.   Pt aware of the importance of taking BP readings at least weekly if not more and during different times each day.  Pt aware that the health  can be used as a resource for lifestyle modifications to help reduce or maintain a healthy BP

## 2017-06-08 NOTE — LETTER
Jennifer Macias  [address] [place] [address]     Dear Camila,    Thank you for enrolling in the [facility]. To participate in our program, we ask that you submit a blood pressure reading at least once weekly through your [facility] Account and maintain regular contact with your Care Team.  We have not received any data or heard from you in some time.     The Digital Medicine Care Team has attempted to reach you on multiple occasions to determine if you would like to continue participating in the program. While we encourage you to continue participating fully, we understand that circumstances may change.      To continue participating in the program, please contact me at [phone]. If we do not hear back, you will be un-enrolled and your physician will be notified of your decision.    We look forward to hearing from you soon.    Sincerely,     Jennifer Macias  Your Personal Health                                                                                                                         Camila Black  [address]  [place] [address]

## 2017-06-12 ENCOUNTER — PATIENT MESSAGE (OUTPATIENT)
Dept: SPORTS MEDICINE | Facility: CLINIC | Age: 47
End: 2017-06-12

## 2017-06-27 ENCOUNTER — PATIENT MESSAGE (OUTPATIENT)
Dept: SPORTS MEDICINE | Facility: CLINIC | Age: 47
End: 2017-06-27

## 2017-07-24 ENCOUNTER — OFFICE VISIT (OUTPATIENT)
Dept: SPORTS MEDICINE | Facility: CLINIC | Age: 47
End: 2017-07-24
Payer: COMMERCIAL

## 2017-07-24 VITALS
HEIGHT: 66 IN | DIASTOLIC BLOOD PRESSURE: 79 MMHG | SYSTOLIC BLOOD PRESSURE: 130 MMHG | WEIGHT: 284 LBS | HEART RATE: 69 BPM | BODY MASS INDEX: 45.64 KG/M2

## 2017-07-24 DIAGNOSIS — M12.561 TRAUMATIC ARTHRITIS OF RIGHT KNEE: Primary | ICD-10-CM

## 2017-07-24 PROCEDURE — 99499 UNLISTED E&M SERVICE: CPT | Mod: S$GLB,,, | Performed by: PHYSICIAN ASSISTANT

## 2017-07-24 PROCEDURE — 99999 PR PBB SHADOW E&M-EST. PATIENT-LVL IV: CPT | Mod: PBBFAC,,, | Performed by: PHYSICIAN ASSISTANT

## 2017-07-24 RX ORDER — MUPIROCIN 20 MG/G
1 OINTMENT TOPICAL
Status: CANCELLED | OUTPATIENT
Start: 2017-07-24

## 2017-07-24 RX ORDER — ASPIRIN 325 MG
325 TABLET, DELAYED RELEASE (ENTERIC COATED) ORAL DAILY
Qty: 42 TABLET | Refills: 0 | Status: SHIPPED | OUTPATIENT
Start: 2017-07-24 | End: 2018-09-26

## 2017-07-24 RX ORDER — OXYCODONE AND ACETAMINOPHEN 10; 325 MG/1; MG/1
1 TABLET ORAL EVERY 6 HOURS PRN
Qty: 60 TABLET | Refills: 0 | Status: SHIPPED | OUTPATIENT
Start: 2017-07-24 | End: 2017-09-20

## 2017-07-24 RX ORDER — ONDANSETRON 4 MG/1
4 TABLET, FILM COATED ORAL EVERY 8 HOURS PRN
Qty: 30 TABLET | Refills: 1 | Status: SHIPPED | OUTPATIENT
Start: 2017-07-24 | End: 2017-08-01

## 2017-07-26 ENCOUNTER — PATIENT OUTREACH (OUTPATIENT)
Dept: OTHER | Facility: OTHER | Age: 47
End: 2017-07-26

## 2017-07-26 ENCOUNTER — OFFICE VISIT (OUTPATIENT)
Dept: OBSTETRICS AND GYNECOLOGY | Facility: CLINIC | Age: 47
End: 2017-07-26
Payer: COMMERCIAL

## 2017-07-26 VITALS
DIASTOLIC BLOOD PRESSURE: 86 MMHG | SYSTOLIC BLOOD PRESSURE: 130 MMHG | WEIGHT: 293 LBS | HEIGHT: 66 IN | BODY MASS INDEX: 47.09 KG/M2

## 2017-07-26 DIAGNOSIS — R39.15 URINARY URGENCY: ICD-10-CM

## 2017-07-26 DIAGNOSIS — Z01.419 ROUTINE GYNECOLOGICAL EXAMINATION: Primary | ICD-10-CM

## 2017-07-26 DIAGNOSIS — Z12.4 CERVICAL CANCER SCREENING: ICD-10-CM

## 2017-07-26 DIAGNOSIS — N39.41 URGE INCONTINENCE: ICD-10-CM

## 2017-07-26 PROCEDURE — 88175 CYTOPATH C/V AUTO FLUID REDO: CPT

## 2017-07-26 PROCEDURE — 99999 PR PBB SHADOW E&M-EST. PATIENT-LVL IV: CPT | Mod: PBBFAC,,, | Performed by: OBSTETRICS & GYNECOLOGY

## 2017-07-26 PROCEDURE — 99396 PREV VISIT EST AGE 40-64: CPT | Mod: S$GLB,,, | Performed by: OBSTETRICS & GYNECOLOGY

## 2017-07-26 PROCEDURE — 87624 HPV HI-RISK TYP POOLED RSLT: CPT

## 2017-07-26 RX ORDER — TRIAMCINOLONE ACETONIDE 1 MG/G
1 OINTMENT TOPICAL 2 TIMES DAILY
Refills: 12 | COMMUNITY
Start: 2017-06-28 | End: 2020-03-16

## 2017-07-26 RX ORDER — OXYBUTYNIN CHLORIDE 15 MG/1
15 TABLET, EXTENDED RELEASE ORAL DAILY
Qty: 30 TABLET | Refills: 0 | Status: SHIPPED | OUTPATIENT
Start: 2017-07-26 | End: 2017-08-27 | Stop reason: SDUPTHER

## 2017-07-26 NOTE — PROGRESS NOTES
"46 yo postmenopausal female who presents today for routine gyn visit.  Last visit here was in 2015 after the patient had an endometrial ablation for menorrhagia.  Patient reports no bleeding for over one year.  No other complaints today.  Last pap was in 2014.  . Declines STD Testing today.    ROS:  GENERAL: Denies weight gain or weight loss. Feeling well overall.   SKIN: Denies rash or lesions.   HEAD: Denies head injury or headache.   NODES: Denies enlarged lymph nodes.   CHEST: Denies chest pain or shortness of breath.   CARDIOVASCULAR: Denies palpitations or left sided chest pain.   ABDOMEN: No abdominal pain, constipation, diarrhea, nausea, vomiting or rectal bleeding.   URINARY: No frequency, dysuria, hematuria, or burning on urination.  REPRODUCTIVE: See HPI.   BREASTS:  denies pain, lumps, or nipple discharge.   HEMATOLOGIC: No easy bruisability or excessive bleeding.   MUSCULOSKELETAL: Denies joint pain or swelling.   NEUROLOGIC: Denies syncope or weakness.   PSYCHIATRIC: Denies depression, anxiety or mood swings.     PE:   Vitals: /86   Ht 5' 6" (1.676 m)   Wt 133.1 kg (293 lb 6.9 oz)   BMI 47.36 kg/m²   APPEARANCE: Well nourished, well developed, in no acute distress.  SKIN: Normal skin turgor, no lesions.  CHEST: Lungs clear to auscultation.  HEART: Regular rate and rhythm, no murmurs, rubs or gallops.  ABDOMEN: Soft. No tenderness or masses. No hepatosplenomegaly. No hernias. Obese.  BREASTS: Symmetrical, no skin changes or visible lesions. No palpable masses, nipple discharge or adenopathy bilaterally.  PELVIC: Normal external female genitalia without lesions. Normal hair distribution. Adequate perineal body, normal urethral meatus. Vagina moist and well rugated without lesions or discharge. Cervix pink and without lesions. (difficult to see given body habitus) No significant cystocele or rectocele. Bimanual exam showed uterus normal size, shape, position, mobile and nontender. Adnexa " without masses or tenderness. Urethra and bladder normal.  EXTREMITIES: No clubbing cyanosis or edema.      AP  Routine gyn  -s/p normal breast exam:   -s/p normal pelvic exam:   -Pap and HPV: collected  -STD testing: declined    GEOVANY Kaba MD

## 2017-07-26 NOTE — LETTER
Teri Davidson, PharmD  [address] [place] [address]     Dear Camila Black,    Welcome to the [facility]!         My name is Teri Davidson PharmD and I am your dedicated Digital Medicine clinician.  As an expert in medication management, I will help ensure that the medications you are taking continue to provide you with the intended benefits.      I am Jennifer Macias and I will be your health  for the duration of the program.  My  job is to help you identify lifestyle changes to improve your blood pressure control.  We will talk about nutrition, exercise, and other ways that you may be able to adjust your current habits to better your health. Together, we will work to improve your overall health and encourage you to meet your goals for a healthier lifestyle.    What we expect from YOU:    You will need to take blood pressure readings multiple times a week and no less than one reading per week.   It is important that you take your measurements at different times during the day, when possible.     What you should expect from your Digital Medicine Care Team:   We will provide you with education about high blood pressure, including lifestyle changes that could help you to control your blood pressure.   We will review your weekly readings and provide you with monthly blood pressure progress reports after you have been in the program for more than 30 days.   We will send monthly progress reports on your blood pressure control to your physician so they can follow along with your progress as well.    You will be able to reach me by phone at [phone] or through your [facility] account by clicking my name under Care Team on the right side of the home screen.    I look forward to working with you to achieve your blood pressure goals!    Sincerely,    Teri Davidson PharmD  Your personal clinician    Please visit  [email or web address] to learn more about high blood pressure and what you can do lower your blood pressure.                                                                                           Camila POLANCO [address]  [place] [address]

## 2017-07-26 NOTE — PROGRESS NOTES
Last 5 Patient Entered Redings Current 30 Day Average: 119/78     Recent Readings 7/24/2017 7/22/2017 7/21/2017 7/21/2017 7/18/2017    Systolic BP (mmHg) 125 137 134 147 126    Diastolic BP (mmHg) 94 78 95 96 75    Pulse 59 60 64 73 62        Hypertension Medications             losartan (COZAAR) 50 MG tablet Take 1 tablet (50 mg total) by mouth once daily. -qam        Called patient to introduce her into the HDM (hypertension digital medicine) clinic.     Verified the following information with the patient:  1. Medications, medication adherence- patient states she is adherent.  2. Drug allergies    Screening questionnaires:  1. Depression- not indicated  2. Sleep apnea- diagnosed, not on CPAP- patient states she is not interested     Explained that our goal is to get her BP to consistently below 140/90mmHg.  Patient denies having further questions, concerns. BP is well controlled. Will continue to monitor, WCB in 4 weeks.

## 2017-07-27 ENCOUNTER — PATIENT MESSAGE (OUTPATIENT)
Dept: SPORTS MEDICINE | Facility: CLINIC | Age: 47
End: 2017-07-27

## 2017-07-27 ENCOUNTER — ANESTHESIA EVENT (OUTPATIENT)
Dept: SURGERY | Facility: HOSPITAL | Age: 47
DRG: 470 | End: 2017-07-27
Payer: COMMERCIAL

## 2017-07-28 ENCOUNTER — ANESTHESIA (OUTPATIENT)
Dept: SURGERY | Facility: HOSPITAL | Age: 47
DRG: 470 | End: 2017-07-28
Payer: COMMERCIAL

## 2017-07-28 ENCOUNTER — HOSPITAL ENCOUNTER (INPATIENT)
Facility: HOSPITAL | Age: 47
LOS: 1 days | Discharge: HOME HEALTH | DRG: 470 | End: 2017-07-29
Attending: ORTHOPAEDIC SURGERY | Admitting: ORTHOPAEDIC SURGERY
Payer: COMMERCIAL

## 2017-07-28 DIAGNOSIS — M12.561 TRAUMATIC ARTHRITIS OF RIGHT KNEE: Primary | ICD-10-CM

## 2017-07-28 LAB
ABO GROUP BLD: NORMAL
B-HCG UR QL: NEGATIVE
BLD GP AB SCN CELLS X3 SERPL QL: NORMAL
CTP QC/QA: YES
INR PPP: 1
PROTHROMBIN TIME: 10.5 SEC
RH BLD: NORMAL

## 2017-07-28 PROCEDURE — 76942 ECHO GUIDE FOR BIOPSY: CPT | Performed by: ANESTHESIOLOGY

## 2017-07-28 PROCEDURE — 36000711: Performed by: ORTHOPAEDIC SURGERY

## 2017-07-28 PROCEDURE — 86901 BLOOD TYPING SEROLOGIC RH(D): CPT

## 2017-07-28 PROCEDURE — 0SRC0J9 REPLACEMENT OF RIGHT KNEE JOINT WITH SYNTHETIC SUBSTITUTE, CEMENTED, OPEN APPROACH: ICD-10-PCS | Performed by: ORTHOPAEDIC SURGERY

## 2017-07-28 PROCEDURE — 27200750 HC INSULATED NEEDLE/ STIMUPLEX: Performed by: ANESTHESIOLOGY

## 2017-07-28 PROCEDURE — 63600175 PHARM REV CODE 636 W HCPCS: Performed by: STUDENT IN AN ORGANIZED HEALTH CARE EDUCATION/TRAINING PROGRAM

## 2017-07-28 PROCEDURE — 25000003 PHARM REV CODE 250: Performed by: PHYSICIAN ASSISTANT

## 2017-07-28 PROCEDURE — 25000003 PHARM REV CODE 250: Performed by: ORTHOPAEDIC SURGERY

## 2017-07-28 PROCEDURE — C1776 JOINT DEVICE (IMPLANTABLE): HCPCS | Performed by: ORTHOPAEDIC SURGERY

## 2017-07-28 PROCEDURE — 27201423 OPTIME MED/SURG SUP & DEVICES STERILE SUPPLY: Performed by: ORTHOPAEDIC SURGERY

## 2017-07-28 PROCEDURE — 63600175 PHARM REV CODE 636 W HCPCS: Performed by: NURSE ANESTHETIST, CERTIFIED REGISTERED

## 2017-07-28 PROCEDURE — 94760 N-INVAS EAR/PLS OXIMETRY 1: CPT

## 2017-07-28 PROCEDURE — 97530 THERAPEUTIC ACTIVITIES: CPT

## 2017-07-28 PROCEDURE — 27447 TOTAL KNEE ARTHROPLASTY: CPT | Mod: 22,RT,, | Performed by: ORTHOPAEDIC SURGERY

## 2017-07-28 PROCEDURE — 37000008 HC ANESTHESIA 1ST 15 MINUTES: Performed by: ORTHOPAEDIC SURGERY

## 2017-07-28 PROCEDURE — D9220A PRA ANESTHESIA: Mod: CRNA,,, | Performed by: NURSE ANESTHETIST, CERTIFIED REGISTERED

## 2017-07-28 PROCEDURE — 25000003 PHARM REV CODE 250: Performed by: NURSE ANESTHETIST, CERTIFIED REGISTERED

## 2017-07-28 PROCEDURE — 86900 BLOOD TYPING SEROLOGIC ABO: CPT

## 2017-07-28 PROCEDURE — 63600175 PHARM REV CODE 636 W HCPCS: Performed by: PHYSICIAN ASSISTANT

## 2017-07-28 PROCEDURE — 27800517 HC TRAY,EPIDURAL-CONTINUOUS: Performed by: ANESTHESIOLOGY

## 2017-07-28 PROCEDURE — 27000221 HC OXYGEN, UP TO 24 HOURS

## 2017-07-28 PROCEDURE — 11000001 HC ACUTE MED/SURG PRIVATE ROOM

## 2017-07-28 PROCEDURE — 63600175 PHARM REV CODE 636 W HCPCS: Performed by: ORTHOPAEDIC SURGERY

## 2017-07-28 PROCEDURE — 85610 PROTHROMBIN TIME: CPT

## 2017-07-28 PROCEDURE — 27427 RECONSTRUCTION KNEE: CPT | Mod: 59,RT,, | Performed by: ORTHOPAEDIC SURGERY

## 2017-07-28 PROCEDURE — 97165 OT EVAL LOW COMPLEX 30 MIN: CPT

## 2017-07-28 PROCEDURE — 64450 NJX AA&/STRD OTHER PN/BRANCH: CPT | Mod: 59,RT,, | Performed by: ANESTHESIOLOGY

## 2017-07-28 PROCEDURE — 63600175 PHARM REV CODE 636 W HCPCS: Performed by: ANESTHESIOLOGY

## 2017-07-28 PROCEDURE — 86850 RBC ANTIBODY SCREEN: CPT

## 2017-07-28 PROCEDURE — C1788 PORT, INDWELLING, IMP: HCPCS | Performed by: ORTHOPAEDIC SURGERY

## 2017-07-28 PROCEDURE — C1713 ANCHOR/SCREW BN/BN,TIS/BN: HCPCS | Performed by: ORTHOPAEDIC SURGERY

## 2017-07-28 PROCEDURE — 71000033 HC RECOVERY, INTIAL HOUR: Performed by: ORTHOPAEDIC SURGERY

## 2017-07-28 PROCEDURE — 36000710: Performed by: ORTHOPAEDIC SURGERY

## 2017-07-28 PROCEDURE — C1762 CONN TISS, HUMAN(INC FASCIA): HCPCS | Performed by: ORTHOPAEDIC SURGERY

## 2017-07-28 PROCEDURE — 37000009 HC ANESTHESIA EA ADD 15 MINS: Performed by: ORTHOPAEDIC SURGERY

## 2017-07-28 PROCEDURE — 25000003 PHARM REV CODE 250: Performed by: STUDENT IN AN ORGANIZED HEALTH CARE EDUCATION/TRAINING PROGRAM

## 2017-07-28 PROCEDURE — D9220A PRA ANESTHESIA: Mod: ANES,,, | Performed by: ANESTHESIOLOGY

## 2017-07-28 PROCEDURE — 71000039 HC RECOVERY, EACH ADD'L HOUR: Performed by: ORTHOPAEDIC SURGERY

## 2017-07-28 PROCEDURE — 25000003 PHARM REV CODE 250: Performed by: ANESTHESIOLOGY

## 2017-07-28 PROCEDURE — 97161 PT EVAL LOW COMPLEX 20 MIN: CPT

## 2017-07-28 PROCEDURE — 64448 NJX AA&/STRD FEM NRV NFS IMG: CPT | Mod: 59,RT,, | Performed by: ANESTHESIOLOGY

## 2017-07-28 PROCEDURE — 81025 URINE PREGNANCY TEST: CPT | Performed by: PHYSICIAN ASSISTANT

## 2017-07-28 PROCEDURE — C1729 CATH, DRAINAGE: HCPCS | Performed by: ORTHOPAEDIC SURGERY

## 2017-07-28 PROCEDURE — 97535 SELF CARE MNGMENT TRAINING: CPT

## 2017-07-28 DEVICE — CEMENT BONE IMPLANT: Type: IMPLANTABLE DEVICE | Site: KNEE | Status: FUNCTIONAL

## 2017-07-28 DEVICE — IMPLANTABLE DEVICE: Type: IMPLANTABLE DEVICE | Site: KNEE | Status: FUNCTIONAL

## 2017-07-28 DEVICE — SEMI TENDONOSIS: Type: IMPLANTABLE DEVICE | Site: KNEE | Status: FUNCTIONAL

## 2017-07-28 RX ORDER — PHENYLEPHRINE HYDROCHLORIDE 10 MG/ML
INJECTION INTRAVENOUS
Status: DISCONTINUED | OUTPATIENT
Start: 2017-07-28 | End: 2017-07-28

## 2017-07-28 RX ORDER — ONDANSETRON 2 MG/ML
4 INJECTION INTRAMUSCULAR; INTRAVENOUS EVERY 12 HOURS PRN
Status: DISCONTINUED | OUTPATIENT
Start: 2017-07-28 | End: 2017-07-28

## 2017-07-28 RX ORDER — MUPIROCIN 20 MG/G
1 OINTMENT TOPICAL
Status: COMPLETED | OUTPATIENT
Start: 2017-07-28 | End: 2017-07-28

## 2017-07-28 RX ORDER — DIPHENHYDRAMINE HYDROCHLORIDE 50 MG/ML
25 INJECTION INTRAMUSCULAR; INTRAVENOUS EVERY 6 HOURS PRN
Status: DISCONTINUED | OUTPATIENT
Start: 2017-07-28 | End: 2017-07-29 | Stop reason: HOSPADM

## 2017-07-28 RX ORDER — LANOLIN ALCOHOL/MO/W.PET/CERES
1000 CREAM (GRAM) TOPICAL DAILY
Status: DISCONTINUED | OUTPATIENT
Start: 2017-07-28 | End: 2017-07-29 | Stop reason: HOSPADM

## 2017-07-28 RX ORDER — SODIUM CHLORIDE 9 MG/ML
INJECTION, SOLUTION INTRAVENOUS CONTINUOUS
Status: DISCONTINUED | OUTPATIENT
Start: 2017-07-28 | End: 2017-07-29 | Stop reason: HOSPADM

## 2017-07-28 RX ORDER — DEXAMETHASONE SODIUM PHOSPHATE 4 MG/ML
INJECTION, SOLUTION INTRA-ARTICULAR; INTRALESIONAL; INTRAMUSCULAR; INTRAVENOUS; SOFT TISSUE
Status: DISCONTINUED | OUTPATIENT
Start: 2017-07-28 | End: 2017-07-28

## 2017-07-28 RX ORDER — FENTANYL CITRATE 50 UG/ML
50 INJECTION, SOLUTION INTRAMUSCULAR; INTRAVENOUS ONCE
Status: COMPLETED | OUTPATIENT
Start: 2017-07-28 | End: 2017-07-28

## 2017-07-28 RX ORDER — MORPHINE SULFATE 2 MG/ML
2 INJECTION, SOLUTION INTRAMUSCULAR; INTRAVENOUS
Status: DISCONTINUED | OUTPATIENT
Start: 2017-07-28 | End: 2017-07-29 | Stop reason: HOSPADM

## 2017-07-28 RX ORDER — OXYCODONE AND ACETAMINOPHEN 10; 325 MG/1; MG/1
2 TABLET ORAL EVERY 6 HOURS PRN
Status: DISCONTINUED | OUTPATIENT
Start: 2017-07-28 | End: 2017-07-28

## 2017-07-28 RX ORDER — MIDAZOLAM HYDROCHLORIDE 1 MG/ML
INJECTION, SOLUTION INTRAMUSCULAR; INTRAVENOUS
Status: DISCONTINUED | OUTPATIENT
Start: 2017-07-28 | End: 2017-07-28

## 2017-07-28 RX ORDER — SODIUM CHLORIDE 9 MG/ML
INJECTION, SOLUTION INTRAVENOUS CONTINUOUS PRN
Status: DISCONTINUED | OUTPATIENT
Start: 2017-07-28 | End: 2017-07-28

## 2017-07-28 RX ORDER — CELECOXIB 200 MG/1
400 CAPSULE ORAL ONCE
Status: COMPLETED | OUTPATIENT
Start: 2017-07-28 | End: 2017-07-28

## 2017-07-28 RX ORDER — OXYCODONE HCL 10 MG/1
10 TABLET, FILM COATED, EXTENDED RELEASE ORAL EVERY 12 HOURS
Status: DISCONTINUED | OUTPATIENT
Start: 2017-07-28 | End: 2017-07-29 | Stop reason: HOSPADM

## 2017-07-28 RX ORDER — CELECOXIB 100 MG/1
200 CAPSULE ORAL DAILY
Status: DISCONTINUED | OUTPATIENT
Start: 2017-07-29 | End: 2017-07-29 | Stop reason: HOSPADM

## 2017-07-28 RX ORDER — ONDANSETRON 2 MG/ML
INJECTION INTRAMUSCULAR; INTRAVENOUS
Status: DISCONTINUED | OUTPATIENT
Start: 2017-07-28 | End: 2017-07-28

## 2017-07-28 RX ORDER — FENTANYL CITRATE 50 UG/ML
INJECTION, SOLUTION INTRAMUSCULAR; INTRAVENOUS
Status: DISCONTINUED | OUTPATIENT
Start: 2017-07-28 | End: 2017-07-28

## 2017-07-28 RX ORDER — PREGABALIN 150 MG/1
150 CAPSULE ORAL NIGHTLY
Status: DISCONTINUED | OUTPATIENT
Start: 2017-07-28 | End: 2017-07-29 | Stop reason: HOSPADM

## 2017-07-28 RX ORDER — ACETAMINOPHEN 500 MG
1000 TABLET ORAL EVERY 6 HOURS
Status: DISCONTINUED | OUTPATIENT
Start: 2017-07-28 | End: 2017-07-29 | Stop reason: HOSPADM

## 2017-07-28 RX ORDER — ROCURONIUM BROMIDE 10 MG/ML
INJECTION, SOLUTION INTRAVENOUS
Status: DISCONTINUED | OUTPATIENT
Start: 2017-07-28 | End: 2017-07-28

## 2017-07-28 RX ORDER — BACITRACIN 50000 [IU]/1
INJECTION, POWDER, FOR SOLUTION INTRAMUSCULAR
Status: DISCONTINUED | OUTPATIENT
Start: 2017-07-28 | End: 2017-07-28 | Stop reason: HOSPADM

## 2017-07-28 RX ORDER — LIDOCAINE HCL/PF 100 MG/5ML
SYRINGE (ML) INTRAVENOUS
Status: DISCONTINUED | OUTPATIENT
Start: 2017-07-28 | End: 2017-07-28

## 2017-07-28 RX ORDER — PROPOFOL 10 MG/ML
VIAL (ML) INTRAVENOUS
Status: DISCONTINUED | OUTPATIENT
Start: 2017-07-28 | End: 2017-07-28

## 2017-07-28 RX ORDER — GLYCOPYRROLATE 0.2 MG/ML
INJECTION INTRAMUSCULAR; INTRAVENOUS
Status: DISCONTINUED | OUTPATIENT
Start: 2017-07-28 | End: 2017-07-28

## 2017-07-28 RX ORDER — LOSARTAN POTASSIUM 50 MG/1
50 TABLET ORAL DAILY
Status: DISCONTINUED | OUTPATIENT
Start: 2017-07-29 | End: 2017-07-29 | Stop reason: HOSPADM

## 2017-07-28 RX ORDER — CEFAZOLIN SODIUM 2 G/50ML
2 SOLUTION INTRAVENOUS
Status: DISPENSED | OUTPATIENT
Start: 2017-07-28 | End: 2017-07-29

## 2017-07-28 RX ORDER — OXYCODONE HYDROCHLORIDE 5 MG/1
10 TABLET ORAL
Status: DISCONTINUED | OUTPATIENT
Start: 2017-07-28 | End: 2017-07-29 | Stop reason: HOSPADM

## 2017-07-28 RX ORDER — KETAMINE HCL IN 0.9 % NACL 50 MG/5 ML
SYRINGE (ML) INTRAVENOUS
Status: DISCONTINUED | OUTPATIENT
Start: 2017-07-28 | End: 2017-07-28

## 2017-07-28 RX ORDER — SODIUM CHLORIDE 0.9 % (FLUSH) 0.9 %
3 SYRINGE (ML) INJECTION
Status: DISCONTINUED | OUTPATIENT
Start: 2017-07-28 | End: 2017-07-28 | Stop reason: HOSPADM

## 2017-07-28 RX ORDER — ONDANSETRON 2 MG/ML
4 INJECTION INTRAMUSCULAR; INTRAVENOUS EVERY 8 HOURS PRN
Status: DISCONTINUED | OUTPATIENT
Start: 2017-07-28 | End: 2017-07-29 | Stop reason: HOSPADM

## 2017-07-28 RX ORDER — TRANEXAMIC ACID 100 MG/ML
1000 INJECTION, SOLUTION INTRAVENOUS ONCE
Status: COMPLETED | OUTPATIENT
Start: 2017-07-28 | End: 2017-07-28

## 2017-07-28 RX ORDER — ACETAMINOPHEN 10 MG/ML
1000 INJECTION, SOLUTION INTRAVENOUS ONCE
Status: COMPLETED | OUTPATIENT
Start: 2017-07-28 | End: 2017-07-28

## 2017-07-28 RX ORDER — OXYCODONE HYDROCHLORIDE 5 MG/1
15 TABLET ORAL
Status: DISCONTINUED | OUTPATIENT
Start: 2017-07-28 | End: 2017-07-29 | Stop reason: HOSPADM

## 2017-07-28 RX ORDER — SUCCINYLCHOLINE CHLORIDE 20 MG/ML
INJECTION INTRAMUSCULAR; INTRAVENOUS
Status: DISCONTINUED | OUTPATIENT
Start: 2017-07-28 | End: 2017-07-28

## 2017-07-28 RX ORDER — HYDROMORPHONE HYDROCHLORIDE 1 MG/ML
0.2 INJECTION, SOLUTION INTRAMUSCULAR; INTRAVENOUS; SUBCUTANEOUS EVERY 5 MIN PRN
Status: DISCONTINUED | OUTPATIENT
Start: 2017-07-28 | End: 2017-07-28 | Stop reason: HOSPADM

## 2017-07-28 RX ORDER — LABETALOL HYDROCHLORIDE 5 MG/ML
INJECTION, SOLUTION INTRAVENOUS
Status: COMPLETED
Start: 2017-07-28 | End: 2017-07-28

## 2017-07-28 RX ORDER — MIDAZOLAM HYDROCHLORIDE 1 MG/ML
2 INJECTION, SOLUTION INTRAMUSCULAR; INTRAVENOUS ONCE
Status: COMPLETED | OUTPATIENT
Start: 2017-07-28 | End: 2017-07-28

## 2017-07-28 RX ORDER — NEOSTIGMINE METHYLSULFATE 1 MG/ML
INJECTION, SOLUTION INTRAVENOUS
Status: DISCONTINUED | OUTPATIENT
Start: 2017-07-28 | End: 2017-07-28

## 2017-07-28 RX ORDER — MIDAZOLAM HYDROCHLORIDE 2 MG/2ML
2 INJECTION, SOLUTION INTRAMUSCULAR; INTRAVENOUS ONCE
Status: COMPLETED | OUTPATIENT
Start: 2017-07-28 | End: 2017-07-28

## 2017-07-28 RX ORDER — ROPIVACAINE HYDROCHLORIDE 2 MG/ML
8 INJECTION, SOLUTION EPIDURAL; INFILTRATION; PERINEURAL CONTINUOUS
Status: DISCONTINUED | OUTPATIENT
Start: 2017-07-28 | End: 2017-07-29 | Stop reason: HOSPADM

## 2017-07-28 RX ORDER — HYDROMORPHONE HYDROCHLORIDE 1 MG/ML
0.5 INJECTION, SOLUTION INTRAMUSCULAR; INTRAVENOUS; SUBCUTANEOUS
Status: DISCONTINUED | OUTPATIENT
Start: 2017-07-28 | End: 2017-07-29 | Stop reason: HOSPADM

## 2017-07-28 RX ORDER — OXYCODONE HYDROCHLORIDE 5 MG/1
5 TABLET ORAL
Status: DISCONTINUED | OUTPATIENT
Start: 2017-07-28 | End: 2017-07-29 | Stop reason: HOSPADM

## 2017-07-28 RX ADMIN — MIDAZOLAM HYDROCHLORIDE 1 MG: 1 INJECTION, SOLUTION INTRAMUSCULAR; INTRAVENOUS at 09:07

## 2017-07-28 RX ADMIN — HYDROMORPHONE HYDROCHLORIDE 0.2 MG: 1 INJECTION, SOLUTION INTRAMUSCULAR; INTRAVENOUS; SUBCUTANEOUS at 02:07

## 2017-07-28 RX ADMIN — Medication 30 MG: at 09:07

## 2017-07-28 RX ADMIN — SODIUM CHLORIDE, SODIUM GLUCONATE, SODIUM ACETATE, POTASSIUM CHLORIDE, MAGNESIUM CHLORIDE, SODIUM PHOSPHATE, DIBASIC, AND POTASSIUM PHOSPHATE: .53; .5; .37; .037; .03; .012; .00082 INJECTION, SOLUTION INTRAVENOUS at 10:07

## 2017-07-28 RX ADMIN — MIDAZOLAM HYDROCHLORIDE 2 MG: 1 INJECTION, SOLUTION INTRAMUSCULAR; INTRAVENOUS at 08:07

## 2017-07-28 RX ADMIN — OXYCODONE HYDROCHLORIDE 15 MG: 5 TABLET ORAL at 02:07

## 2017-07-28 RX ADMIN — GLYCOPYRROLATE 0.2 MG: 0.2 INJECTION, SOLUTION INTRAMUSCULAR; INTRAVENOUS at 09:07

## 2017-07-28 RX ADMIN — FENTANYL CITRATE 50 MCG: 50 INJECTION, SOLUTION INTRAMUSCULAR; INTRAVENOUS at 10:07

## 2017-07-28 RX ADMIN — LIDOCAINE HYDROCHLORIDE 100 MG: 20 INJECTION, SOLUTION INTRAVENOUS at 09:07

## 2017-07-28 RX ADMIN — Medication 2 G: at 10:07

## 2017-07-28 RX ADMIN — MUPIROCIN 1 G: 20 OINTMENT TOPICAL at 07:07

## 2017-07-28 RX ADMIN — SODIUM CHLORIDE: 0.9 INJECTION, SOLUTION INTRAVENOUS at 08:07

## 2017-07-28 RX ADMIN — PHENYLEPHRINE HYDROCHLORIDE 50 MCG: 10 INJECTION INTRAVENOUS at 12:07

## 2017-07-28 RX ADMIN — SODIUM CHLORIDE, SODIUM GLUCONATE, SODIUM ACETATE, POTASSIUM CHLORIDE, MAGNESIUM CHLORIDE, SODIUM PHOSPHATE, DIBASIC, AND POTASSIUM PHOSPHATE: .53; .5; .37; .037; .03; .012; .00082 INJECTION, SOLUTION INTRAVENOUS at 12:07

## 2017-07-28 RX ADMIN — ROPIVACAINE HYDROCHLORIDE 8 ML: 2 INJECTION, SOLUTION EPIDURAL; INFILTRATION at 02:07

## 2017-07-28 RX ADMIN — FENTANYL CITRATE 50 MCG: 50 INJECTION INTRAMUSCULAR; INTRAVENOUS at 08:07

## 2017-07-28 RX ADMIN — PROPOFOL 50 MG: 10 INJECTION, EMULSION INTRAVENOUS at 09:07

## 2017-07-28 RX ADMIN — PHENYLEPHRINE HYDROCHLORIDE 100 MCG: 10 INJECTION INTRAVENOUS at 09:07

## 2017-07-28 RX ADMIN — CELECOXIB 400 MG: 200 CAPSULE ORAL at 02:07

## 2017-07-28 RX ADMIN — OXYCODONE HYDROCHLORIDE 10 MG: 10 TABLET, FILM COATED, EXTENDED RELEASE ORAL at 08:07

## 2017-07-28 RX ADMIN — OXYBUTYNIN CHLORIDE 15 MG: 5 TABLET, EXTENDED RELEASE ORAL at 03:07

## 2017-07-28 RX ADMIN — PROPOFOL 250 MG: 10 INJECTION, EMULSION INTRAVENOUS at 09:07

## 2017-07-28 RX ADMIN — ROCURONIUM BROMIDE 5 MG: 10 INJECTION, SOLUTION INTRAVENOUS at 09:07

## 2017-07-28 RX ADMIN — GLYCOPYRROLATE 0.3 MG: 0.2 INJECTION, SOLUTION INTRAMUSCULAR; INTRAVENOUS at 01:07

## 2017-07-28 RX ADMIN — FENTANYL CITRATE 25 MCG: 50 INJECTION, SOLUTION INTRAMUSCULAR; INTRAVENOUS at 01:07

## 2017-07-28 RX ADMIN — ROCURONIUM BROMIDE 30 MG: 10 INJECTION, SOLUTION INTRAVENOUS at 09:07

## 2017-07-28 RX ADMIN — DIPHENHYDRAMINE HYDROCHLORIDE 25 MG: 50 INJECTION, SOLUTION INTRAMUSCULAR; INTRAVENOUS at 05:07

## 2017-07-28 RX ADMIN — OXYCODONE HYDROCHLORIDE 15 MG: 5 TABLET ORAL at 08:07

## 2017-07-28 RX ADMIN — SUCCINYLCHOLINE CHLORIDE 160 MG: 20 INJECTION, SOLUTION INTRAMUSCULAR; INTRAVENOUS at 09:07

## 2017-07-28 RX ADMIN — OXYCODONE HYDROCHLORIDE 15 MG: 5 TABLET ORAL at 05:07

## 2017-07-28 RX ADMIN — PREGABALIN 150 MG: 150 CAPSULE ORAL at 08:07

## 2017-07-28 RX ADMIN — FENTANYL CITRATE 50 MCG: 50 INJECTION, SOLUTION INTRAMUSCULAR; INTRAVENOUS at 09:07

## 2017-07-28 RX ADMIN — ROPIVACAINE HYDROCHLORIDE 8 ML/HR: 2 INJECTION, SOLUTION EPIDURAL; INFILTRATION at 02:07

## 2017-07-28 RX ADMIN — ONDANSETRON 4 MG: 2 INJECTION INTRAMUSCULAR; INTRAVENOUS at 11:07

## 2017-07-28 RX ADMIN — CEFAZOLIN SODIUM 2 G: 2 SOLUTION INTRAVENOUS at 04:07

## 2017-07-28 RX ADMIN — VANCOMYCIN HYDROCHLORIDE 1500 MG: 100 INJECTION, POWDER, LYOPHILIZED, FOR SOLUTION INTRAVENOUS at 07:07

## 2017-07-28 RX ADMIN — ACETAMINOPHEN 1000 MG: 10 INJECTION, SOLUTION INTRAVENOUS at 02:07

## 2017-07-28 RX ADMIN — ROCURONIUM BROMIDE 10 MG: 10 INJECTION, SOLUTION INTRAVENOUS at 10:07

## 2017-07-28 RX ADMIN — PHENYLEPHRINE HYDROCHLORIDE 50 MCG: 10 INJECTION INTRAVENOUS at 09:07

## 2017-07-28 RX ADMIN — ACETAMINOPHEN 1000 MG: 500 TABLET ORAL at 05:07

## 2017-07-28 RX ADMIN — TRANEXAMIC ACID 1000 MG: 100 INJECTION, SOLUTION INTRAVENOUS at 09:07

## 2017-07-28 RX ADMIN — CYANOCOBALAMIN TAB 1000 MCG 1000 MCG: 1000 TAB at 03:07

## 2017-07-28 RX ADMIN — DEXAMETHASONE SODIUM PHOSPHATE 8 MG: 4 INJECTION, SOLUTION INTRAMUSCULAR; INTRAVENOUS at 09:07

## 2017-07-28 RX ADMIN — SODIUM CHLORIDE: 0.9 INJECTION, SOLUTION INTRAVENOUS at 03:07

## 2017-07-28 RX ADMIN — NEOSTIGMINE METHYLSULFATE 2.5 MG: 1 INJECTION INTRAVENOUS at 01:07

## 2017-07-28 NOTE — PLAN OF CARE
Problem: Physical Therapy Goal  Goal: Physical Therapy Goal  Goals to be met by: 2017     Patient will increase functional independence with mobility by performin. Supine to sit with Stand-by Assistance  2. Sit to supine with Stand-by Assistance  3. Sit to stand transfer with Stand-by Assistance using standard walker  4. Bed to chair transfer with Contact Guard Assistance using Standard Walker  5. Gait  x 100 feet with Contact Guard Assistance using Standard Walker.   6. Ascend/descend 3 stair with right Handrails Contact Guard Assistance using Standard Walker.     Outcome: Ongoing (interventions implemented as appropriate)  Evaluation complete. Goals appropriate to improve functional mobility.    Danilo Aviles III, DIANAT  2017

## 2017-07-28 NOTE — PLAN OF CARE
VSS. Patient states pain is tolerable. No N&V. Teds/SCD/footpump's in place throughout duration in PACU. Transferred to room 544A

## 2017-07-28 NOTE — ANESTHESIA PREPROCEDURE EVALUATION
07/28/2017  Camila Black is a 47 y.o., female.    Anesthesia Evaluation    I have reviewed the Patient Summary Reports.    I have reviewed the Nursing Notes.      Review of Systems  Anesthesia Hx:  Denies Hx of Anesthetic complications    Social:  Non-Smoker    Cardiovascular:   Exercise tolerance: good Hypertension Denies CAD.     Denies Angina.        Pulmonary:   Denies COPD.  Denies Asthma.  Denies Shortness of breath. Sleep Apnea    Renal/:   Denies Chronic Renal Disease.     Hepatic/GI:   GERD, well controlled Denies Liver Disease.    Musculoskeletal:   Arthritis     Neurological:   Denies CVA. Denies Seizures.    Endocrine:   Denies Diabetes. Denies Hypothyroidism.        Physical Exam  General:  Morbid Obesity    Airway/Jaw/Neck:  Airway Findings: Mallampati: III Improves to II with phonation.  TM Distance: Normal, at least 6 cm  Jaw/Neck Findings:  Neck ROM: Normal ROM       Chest/Lungs:  Chest/Lungs Findings: Normal Respiratory Rate     Heart/Vascular:  Heart Findings: Rate: Normal        Mental Status:  Mental Status Findings:  Cooperative, Alert and Oriented         Anesthesia Plan  Type of Anesthesia, risks & benefits discussed:  Anesthesia Type:  CSE, spinal, general  Patient's Preference: Neuraxial vs GA  Intra-op Monitoring Plan: standard ASA monitors  Intra-op Monitoring Plan Comments:   Post Op Pain Control Plan: multimodal analgesia, IV/PO Opiods PRN and peripheral nerve block  Post Op Pain Control Plan Comments:   Induction:   IV  Beta Blocker:  Patient is not currently on a Beta-Blocker (No further documentation required).       Informed Consent: Patient understands risks and agrees with Anesthesia plan.  Questions answered. Anesthesia consent signed with patient.  ASA Score: 3     Day of Surgery Review of History & Physical:    H&P update referred to the surgeon.      Anesthesia Plan Notes: Discussed Risks/Benefits of anesthetic plan  PMH was reviewed and PE was performed  Will plan for neuraxial technique and convert to GA if needed        Ready For Surgery From Anesthesia Perspective.

## 2017-07-28 NOTE — PROGRESS NOTES
1425 - Dr. Lucas paged to clarify if pt requires  postop lab and IVF. No return call back    1455 - DrNapoleon on call with Dr. Irvin paged, awaiting return call back.

## 2017-07-28 NOTE — ANESTHESIA PROCEDURE NOTES
Adductor Canal Catheter    Patient location during procedure: pre-op   Block not for primary anesthetic.  Reason for block: at surgeon's request and post-op pain management   Post-op Pain Location: right knee pain  Start time: 7/28/2017 8:36 AM  Timeout: 7/28/2017 8:30 AM   End time: 7/28/2017 8:48 AM  Staffing  Anesthesiologist: ИРИНА COLON  Performed: anesthesiologist   Preanesthetic Checklist  Completed: patient identified, site marked, surgical consent, pre-op evaluation, timeout performed, IV checked, risks and benefits discussed and monitors and equipment checked  Peripheral Block  Patient position: supine  Prep: ChloraPrep and site prepped and draped  Patient monitoring: heart rate, cardiac monitor, continuous capnometry, continuous pulse ox and frequent blood pressure checks  Block type: adductor canal  Laterality: right  Injection technique: continuous  Needle  Needle type: Tuohy   Needle gauge: 17 G  Needle length: 3.5 in  Needle localization: anatomical landmarks and ultrasound guidance  Catheter type: spring wound  Catheter size: 19 G  Test dose: lidocaine 1.5% with Epi 1-to-200,000 and negative   -ultrasound image captured on disc.  Assessment  Injection assessment: negative aspiration, negative parasthesia and local visualized surrounding nerve  Paresthesia pain: none  Heart rate change: no  Slow fractionated injection: yes  Medications:  Bolus administered: 20 mL of 0.25 ropivacaine  Epinephrine added: 3.75 mcg/mL (1/300,000)  Additional Notes  VSS.  [facility] RN monitoring vitals throughout procedure.  Patient tolerated procedure well.

## 2017-07-28 NOTE — H&P (VIEW-ONLY)
Camila Black  is here for a completion of her perioperative paperwork. she  Is scheduled to undergo 1. Right knee Conformis iTotal on 7/28/2017.  She is a healthy individual and does not need clearance for this procedure.     Risks, indications and benefits of the surgical procedure were discussed with the patient. All questions with regard to surgery, rehab, expected return to functional activities, activities of daily living and recreational endeavors were answered to her satisfaction.    Once no other questions were asked, a brief history and physical exam was then performed.      PHYSICAL EXAM:  GEN: A&Ox3, WD WN NAD  HEENT: WNL  CHEST: CTAB, no W/R/R  HEART: RRR, no M/R/G  ABD: Soft, NT ND, BS x4 QUADS  MS; See Epic  NEURO: CN II-XII intact       The surgical consent was then reviewed with the patient, who agreed with all the contents of the consent form and it was signed. she was then given the [facility] surgery packet to bring with her to [facility] for the anesthesia portion of her perioperative paperwork.     PHYSICAL THERAPY:  She was also instructed regarding physical therapy and will begin on  POD #1 at [facility].     POST OP CARE:instructions were reviewed including care of the wound and dressing after surgery and when she can shower.     PAIN MANAGEMENT: Camila Black was also given her pain management regime, which includes the TENS unit given to her by Paresh Miranda along with the education required for its use. She was also instructed regarding the Polar ice unit that will be in place after surgery and her postoperative pain medications.     PAIN MEDICATION:  Percocet 10/325mg 1 po q 4-6 hours prn pain  Phenergan 25 mg one p.o. q.4-6 hours p.r.n. nausea and vomiting.  ASA 325mg once a day x 6 weeks    As there were no other questions to be asked, she was given my business card along with Grace Irvin MD business card if she has any questions or concerns prior to surgery or in the  postop period.

## 2017-07-28 NOTE — NURSING TRANSFER
Nursing Transfer Note      7/28/2017     Transfer To: 544A    Transfer via stretcher    Transfer with polar ice, knee immobilizer    Transported by [facility]    Medicines sent: FABIENNE Jacobs    Chart send with patient: Yes    Notified: spouse    Patient reassessed at: 9086 7/28/17

## 2017-07-28 NOTE — ANESTHESIA PROCEDURE NOTES
IPACK Single Injection Block    Patient location during procedure: pre-op   Block not for primary anesthetic.  Reason for block: at surgeon's request and post-op pain management   Post-op Pain Location: right knee pain  Start time: 7/28/2017 8:50 AM  Timeout: 7/28/2017 8:30 AM   End time: 7/28/2017 8:53 AM  Staffing  Anesthesiologist: ИРИНА COLON  Performed: anesthesiologist   Preanesthetic Checklist  Completed: patient identified, site marked, surgical consent, pre-op evaluation, timeout performed, IV checked, risks and benefits discussed and monitors and equipment checked  Peripheral Block  Patient position: supine  Prep: ChloraPrep  Patient monitoring: heart rate, cardiac monitor, continuous pulse ox, continuous capnometry and frequent blood pressure checks  Block type: I PACK  Laterality: right  Injection technique: single shot  Needle  Needle type: Stimuplex   Needle gauge: 21 G  Needle length: 6 in  Needle localization: anatomical landmarks and ultrasound guidance   -ultrasound image captured on disc.  Assessment  Injection assessment: negative aspiration, negative parasthesia and local visualized surrounding nerve  Paresthesia pain: none  Heart rate change: no  Slow fractionated injection: yes  Medications:  Bolus administered: 20 mL of 0.25 ropivacaine  Epinephrine added: 3.75 mcg/mL (1/300,000)  Additional Notes  VSS.  [facility] RN monitoring vitals throughout procedure.  Patient tolerated procedure well.

## 2017-07-28 NOTE — PT/OT/SLP EVAL
Occupational Therapy   Evaluation    Camila Black   MRN: [id number]     OT Received On: 7/28/2017  OT Start Time: 1540  OT Stop Time: 1600   OT Total Time: 20 minutes     Billable Minutes:  Evaluation 12 minutes  Self Care/Home Management 8 minutes    Diagnosis:  s/p R TKA and MCL reconstruction    Past Medical History:   Diagnosis Date    Arthritis 2012    OA    B12 deficiency 5/1/2017    Basal cell carcinoma 2/2010    right scalp    Cancer 2010    SKIN CANCER TO SCALP    Hypertension     Morbid obesity with BMI of 40.0-44.9, adult 7/25/2014    S/P gastric bypass 7/13/2015     Past Surgical History:   Procedure Laterality Date    ABDOMINAL SURGERY  2007    GASTRIC BYPASS    C- SECTION X2  1995 AND 2005    DILATION AND CURETTAGE OF UTERUS      ENDOMETRIAL ABLATION      TUBAL LIGATION         General Precautions: Standard, fall  Orthopedic Precautions: RLE partial weight bearing      Pt found with blood pressure cuff, polar ice, PNC, knee immobilizer, hemovac telemetry, PCEA, pulse ox, Peripheral IV and FCD.      Patient History:  Pt lives with spouse in [facility] with 3 BRITANY    DME owned: cane    Previous Level of Function:  Bed Mobility/Transfers: (I)  ADLs: (I)    Subjective:  Communicated with RN prior to session.    Pt agreeable to OT eval    Pain level: 6/10 in R knee    Objective:  Patient found Supine    Upper Extremity Range of Motion:  Right Upper Extremity: WFL  Left Upper Extremity: WFL     Upper Extremity Strength:  Right Upper Extremity: WFL  Left Upper Extremity: WFL    Functional Mobility:    Bed Mobility:    Supine to sit: [place]  Sit to supine: Mingo    Transfers:    Sit<>Stand: [place] with SW    Ambulation:    [place]ingo with SW 3 forward, backward, and side steps    Activities of Daily Living:  UE dressing: Maximum Assistance to matthew gown around back  LE dressing: Total Assistance to matthew socks   Pt educated on LE dressing techniques  Toileting with Max A     Patient left supine with all lines  intact and RN notified.    AM-PAC 6 CLICK MOBILITY  1 = Unable, Total/Dependent Assistance  2 = A lot, Maximum/Moderate Assistance  3 = A little, Minimum/Contact Guard/Supervision  4 = None, Modified [place]/Independent    Putting on and taking off regular lower body clothing? : 2  Bathing (including washing, rinsing, drying)?: 2  Toileting, which includes using toilet, bedpan, or urinal? : 2  Putting on and taking off regular upper body clothing?: 3  Taking care of personal grooming such as brushing teeth?: 3  Eating meals?: 4  Total Score: 16    AM-PAC Raw Score   CMS G-Code Modifier   Level of Impairment   Assistance     6   CN   100%         Total / Unable   7 - 9   CM   80 - 100%   Maximal Assist     10 - 14   CL   60 - 80%   Moderate Assist     15 - 19   CK   40 - 60%   Moderate Assist     20 - 22   CJ   20 - 40%   Minimal Assist     23   CI   1-20%         SBA / CGA     24 CH   0%   Independent/Modified Independent       Assessment:  Camila Black is a 47 y.o. female with a medical diagnosis of s/p R TKA. She presents with decreased function and orthopedic precautions of R LE impeding her ability to perform ADLs and functional mobility and would benefit from OT services to maximize functional (I) and safety.      Rehab identified problem list/impairments: weakness, impaired endurance, impaired sensation, impaired self care skills, impaired functional mobilty, gait instability, impaired balance, decreased coordination, decreased lower extremity function, decreased safety awareness, pain, decreased ROM, impaired skin, edema, orthopedic precautions    Rehab potential is good.    Activity tolerance: good    Discharge recommendations: Home with HH     Barriers to discharge: Inaccessible home environment     Equipment recommendations: standard walker, bedside commode and shower chair    GOALS:    Occupational Therapy Goals        Problem: Occupational Therapy Goal    Goal Priority Disciplines  Outcome Interventions   Occupational Therapy Goal     OT, PT/OT     Description:  Goals to be met by: 7 days    Patient will increase functional independence with ADLs by performing:    UE Dressing with Supervision.  LE Dressing with Supervision with AD as needed.  Grooming while standing with Supervision.  Toileting from bedside commode with Supervision for hygiene and clothing management.   Stand pivot transfers with Supervision.  Toilet transfer to bedside commode with Supervision.                         PLAN:    Patient to be seen QD to address the above listed problems via self-care/home management, therapeutic activities, therapeutic exercises  Plan of Care reviewed with: patient    LILLY Abrams  7/28/2017

## 2017-07-28 NOTE — PLAN OF CARE
Problem: Occupational Therapy Goal  Goal: Occupational Therapy Goal  Goals to be met by: 7 days    Patient will increase functional independence with ADLs by performing:    UE Dressing with Supervision.  LE Dressing with Supervision with AD as needed.  Grooming while standing with Supervision.  Toileting from bedside commode with Supervision for hygiene and clothing management.   Stand pivot transfers with Supervision.  Toilet transfer to bedside commode with Supervision.         LILLY Abrams  7/28/2017

## 2017-07-28 NOTE — INTERVAL H&P NOTE
The patient has been examined and the H&P has been reviewed:    I concur with the findings and no changes have occurred since H&P was written.    Anesthesia/Surgery risks, benefits and alternative options discussed and understood by patient/family.          Active Hospital Problems    Diagnosis  POA    Traumatic arthritis of right knee [M12.561]  Yes      Resolved Hospital Problems    Diagnosis Date Resolved POA   No resolved problems to display.

## 2017-07-28 NOTE — DISCHARGE INSTRUCTIONS
[address], [place]                                                                                          [phone]                   Postoperative Instructions for Knee Surgery                 Your Surgery Included:   Open               Arthroscopic   [] Ligament Repair       [] Diagnostic           [] ACL     [] PCL     [] MCL     [] PLLC      [] Synovectomy / Plica Removal [] Meniscal Cartilage Repair / Transplantation      [] Lysis of Adhesions / Manipulation [] Articular Cartilage Repair      [] Interval Release           [] Microfracture       [] OATS   [] ACI      [] Meniscectomy           [] Osteochondral Allograft      [] Meniscal Cartilage Repair  [] Patellar Realignment       [] Debridement / Chondroplasty         [] Lateral Release   [] Ligament Repair      [] Articular Cartilage Repair          [] Extensor Mechanism             []   Microfracture  []  OATS [] Tendon Repair          [] Ligament Reconstruction          [] Patella                  [] Quadriceps             []   ACL    []   PCL  [] High Tibial Osteotomy       [] PRP Arthrocentesis  [x] Joint Replacement                    [] Unicompartmental   [] Patellofemoral                    [] Total Knee             X MCL RECONSTRUCTION      Call our office ([phone]) immediately if you experience any of the following:       Excessive bleeding or pus like drainage at the incision site       Uncontrollable pain not relieved by pain medication       Excessive swelling or redness at the incision site       Fever above 101.5 degrees not controlled with Tylenol or Motrin       Shortness of Breath       Any foul odor or blistering from the surgery site    FOR EMERGENCIES: If any unusual problems or difficulties occur, call our office at [phone], or page the  at [phone] who will direct your call appropriately    1.   Pain Management: A cold therapy cuff, pain medications, local  injections, and in some cases, regional anesthesia injections are used to manage your post-operative pain. The decision to use each of these options is based on their risks and benefits.     Medications: You were given one or more of the following medication prescriptions before leaving the hospital. Have the prescriptions filled at a pharmacy on your way home and follow the instructions on the bottles. If you need a refill, please call your pharmacy.      Narcotic Medication (usually Vicodin ES, Lortab, Percocet or Nucynta): Begin taking the medication before your knee starts to hurt. Some patients do not like to take any medication, but if you wait until your pain is severe before taking, you will be very uncomfortable for several hours waiting for the narcotic to work. Always take with food.     Nausea / Vomiting: For this issue, we prescribe Phenergan, use this medication as directed.     Cold Therapy: You may have been sent home with a [employer]® cold therapy unit and wrap for your knee. Fill with ice and water to the indicated fill line and use throughout the day for the first two days and then as needed to help relieve pain and control swelling.      Regional Anesthesia Injections (Blocks): You may have been given a regional nerve block either before or after surgery. This may make your entire leg numb for 24-36 hours.                            * Proceed with caution when bearing weight on your leg.     2.   Diet: Eat a bland diet for the first day after surgery. Progress your diet as tolerated. Constipation may occur with Narcotic usage, contact our office if you are experiencing constipation.    3.   Activity: Limit your activity during the first 48 hours, keep your leg elevated with pillows under your heel. After the first 48 hours at home, increase your activity level based on your symptoms.    4.   Dressing Change: Remove the dressing on the 3rd day. It is normal for some blood to be seen on the  dressings. It is also normal for you to see apparent bruising on the skin around your knee when you remove the dressing. If present, leave the steri-strip tape across the incisions. If you are concerned by the drainage or the appearance of your knee, please call one of the numbers listed below.    5.   Showering: You may shower on the 10th day after surgery if the wound is dry and clean, but do not let the wound soak in water until sutures are removed. Do not submerge in any water until after your postoperative appointment in clinic.    6.   Knee Brace: You may have been sent home in a hinged knee brace. Your brace is set at 0 to 0 degrees of motion. Wear the brace for 6 weeks, LOCKED in full extension when walking, you will need to wear this brace at all time unless instructed otherwise. You may unlock at rest or for exercises.    7.   Your procedure did not require a Continuous Passive Motion (CPM) device.    8.   Weight Bearing: You may have been sent home with crutches. If instructed (see below), use these crutches at all times unless at complete rest.      [] Non-weight bearing for     0 weeks (you may touch your toes to the floor)      [x] Partial weight bearing for  6 weeks    [x] 25% Body Weight   [] 50% Body Weight      [x] Full weight bearing            []  NOW    [x]  after 6 weeks     9.  Knee Exercises: Begin these exercises the first day after surgery in order to help you regain your motion and strength. You may do the following marked exercises:     [x] Quad Sets - Begin activating your quadriceps muscle by driving your          knee downward into full knee extension while seated on a table or bed   with a towel rolled and propped under your heel     [x] Straight Leg Raise (SLR) - While irvin your quadriceps muscle, lift     your fully extended leg to the level of your non-operative knee (as shown)     [] Heel Slides - With the knee straight, slide your heel slowly toward your   buttocks, hold  "at the endpoint for 10-15 seconds, then slowly straighten     [x] Ankle pumps - With your knee straight, move your ankle in a "pumping"    fashion to activate your calf and leg muscles      10.  Physical Therapy: Physical therapy is an essential component to your recovery from surgery. Your physical therapy will start in 6 weeks.    FIRST POSTOPERATIVE VISIT: As scheduled.       "

## 2017-07-28 NOTE — OP NOTE
DATE OF PROCEDURE:  7/28/2017    ATTENDING SURGEON: Surgeon(s) and Role:     * Grace Irvin MD - Primary     FIRST ASSISTANT:Malik Salazar MD - Fellow  SECOND ASSISTANT: SMA Lisseth - Assistant    PREOPERATIVE DIAGNOSIS: Right Arthritis, Traumatic M12.50, DJD knee M17.9 and Genu Valgum (acquired) M21.069    POSTOPERATIVE DIAGNOSIS:  Right Arthritis, Traumatic M12.50, DJD knee M17.9 and Genu Valgum (acquired) M21.069    PROCEDURES PERFORMED:  Right  1.  Replacement, Knee, Medial and Lateral compartment (Total Knee) 11834, Complex  2.  Medial Collateral Ligament Reconstruction, (Extra-articular), 50879  Complexity of the case was increased due to the patient's BMI / body morphology, chronic nature of the problem and the degree of preoperative genu valgum noted.     ANESTHESIA:  General / LMA / IPACK and Adductor block    FLUIDS IN THE CASE: 1000 mL     TOURNIQUET TIME: 15 minutes.    COMPLICATIONS: NONE    URINE OUTPUT: 0 mL    ESTIMATED BLOOD LOSS: 300 mL    CONDITION:  Good      INDICATIONS FOR OPERATIVE PROCEDURES:  Camila Black is a 47 y.o.  female with history of right knee pain and pathology. He noted significant problems in the area of concern with problems on activities of daily living and aggressive use of the right leg. As a result of these problems and problems with overall   activity level, the patient was deemed to be an appropriate candidate for   operative intervention. There was significant genu significant valgusnoted and  based on the patient's age and functional level, the patient was deemed to be an appropriate candidate for use of Conformis Implant products.    IMPLANTS UTILIZED:   ConforMIS tibial femoral implant  ConforMIS total tibial tray  ConforMIS 8 mm medial insert  ConforMIS lateral C insert  ConforMIS 38 mm x 8.5 mm patellar component  Smartset Gentamicin bone Cement    MTF - Semitendinosis graft - looped diameter 7.0 mm    Synthes 6.5 x 60 mm, 6.5 x 50 mm  screws  Conmed Spiked 14 mm x 1.3 mm post spiked washer  Conmed Spiked 17 mm x 1.3 mm post spiked washer    Mitek Healix double loaded 4.5 mm and 5.5 mm anchors    FINDINGS:     ARTICULAR CARTILAGE LESION(S):  Medial Femoral Condyle: ICRS Grade 4A      Size: 3.5 x 4.0 cm  Medial tibial plateau: ICRS Grade 4A      Size: 4.0 x 4.0 cm        Lateral Femoral Condyle: ICRS Grade 4A      Size: 3 x 6 cm  Lateral tibial plateau: ICRS Grade 4B      Size: 2.5 x 3.0 cm        Patellar surface: ICRS Grade 4A      Size: 3.5 x 4.0 cm  Trochlear groove: ICRS Grade 4B      Size: 3.5 x 3.5 cm    EXAMINATION UNDER ANESTHESIA:   Extension -5 degrees  Flexion 100 degrees  Lachman Maneuver:  1A  Anterior Drawer: 0-3 mm  Posterior Drawer:  0-3 mm    DESCRIPTION OF PROCEDURE:   The patient was brought into the Operating Room and placed in supine position. Upon application of femoral block in the preoperative holding area, the patient underwentGeneral to stabilize the area. The patient was given the appropriate dose of antibiotics based on body weight. Timeout was utilized to verify the RIGHT LEFT BILATERAL:66555} side as the operative side. Both upper extremities were placed in comfortable position. The nonoperative leg was carefully padded along the heel and peroneal nerve regions. Flores catheter was applied. Operative leg was then prepped and draped in a sterile fashion with ChloraPrep material with a bump under the right hip and popliteal post along the right side of the table. Once prepped and draped in sterile fashion, Coban was placed on the knee. A lateral based incision was carried down the skin down to fat and fascia. A lateral subvastus approach was performed and the patella was subluxed medial  Flaps were raised superiorly and inferiorly as well as medially and laterally along the region of concern.      Attention was turned to the distal femur and the # 1 preparation device from [employer] was used to create the distal lug  holes for the implant and # 2 guide. We then   drilled the distal femoral region as medially and laterally along the region of concern. We placed a # 2 cutting block, which was applied made the distal   pin holes, which were then removed simultaneously and drilled proximal pin holes and pins to stabilize the distal femoral cutting block. This was left in place and the saw blade used to create the distal femoral cut. Bone was removed. We then   assessed our cut with a #3 guide from the ConforMIS set. The cut was found to be excellent. As a result, we turned our attention to the proximal tibia.  ACL structure was resected. The PCL structure was recessed. Medial and lateral meniscus remnants were removed with the Bovie cautery. We then applied the # 4   cutting system directly over the area of concern. We used to currettes to remove the cartilage from the proximal tibia down to the subchondral bone level. Extramedullary alignment fito was used to verify appropriate alignment. The cutting guide was pinned into position and an oscillating saw used, we made the proximal cut. We used the # 3 guide to assess our extension gap and the # 6 guide to assess our flexion gap. There gaps were symmetric flexion and extension gaps with appropriate alingment as well.    With this performed, we then visualized the distal femus once again and placed the # 7 femoral cutting guide into position and pinned this with the appropriate distal femoral rotation. The previously placed pin holes along the distal femur were used to position this cutting guide and the oblique pin holes created and the pins placed to hold the guide in the appropriated rotation. The central pins were removed. Anterior and posterior condylar and chamfer cuts were created as well as the distal lug holes for the femoral component. The # 8/9 femoral guide was applied and the final posterior chamfer cuts were created. We placed the trial femoral and tibial components  demonstrating great stability in flexion and extension with varus and valgus load as well as great flexion without shift of the tibial component.    We re-exposed the proximal tibia, placed the preparation tray for the tibial   Region and pinned this into position. The central tower was applied and the proximal tibia drilled centrally followed by application of the trial keel. We then removed the trial keel.We exposed the patella, sized the patella, demonstrating the 38 mm patellar component would most normalize the patient's anatomy. As a result, we removed approximately 10 mm of bone, drilled 3 peg holes and placed the trial patella fit in excellent fashion. The knee was taken through range of motion demonstrating excellent tracking and stability. As a result, trial components were left in place. An Esmarch was then used to exsanguinate the limb. Tourniquet was inflated. Trial components were removed. We exposed the femoral, proximal tibial plateau and patellar regions. Pulsavac was used to remove all blood elements and the areas dried and prepared for cementing. We then mixed cement and placed cement first at the tibial region and placed the tibial component position and extruded cement was removed. We then turned our attention to the femoral and patellar regions. These dried areas were then   cemented with extruded cement removed from the femoral component as well as applied cement to the patellar component. The we then performed trial reduction with the trial inserts applied. Knee was taken to extension demonstrating excellent stability with no signs of hyperextension remaining. The tourniquet was released and all bleeding sites were cauterized. Final pulsavac lavage was performed with application of 3 liters of fluid through the knee. Trial components were removed and the actual 8 mm medial insert was applied along with the size C lateral insert. A drain was taken out of the lateral aspect of the knee. An  additional HV drain was placed in the superficial soft tissue structures outside the deep fascia as well.     There was continued insufficiency of the medial portion of the knee as a result decision was made to proceed with MCL reconstruction. Dissection was carried over to the medial portion of the knee. The medial MCL demonstrated disruption distally as a result this area was exposed. The tissue was present but was of poor quality as a result we dissected proximally at the medial epicondylar level. The area was drilled in preparation for placement of the proximal screw and spiked washer. The area was drilled and tapped just distal to the planned screw region and placement of a double loaded anchor was performed as well. The graft from MTF was thawed and prepared with a #2 Fiber loop proximally and two orthocord sutures distally placed in Krackow fashion. The 6.5 x 60 mm screw and washer construct was placed through the looped portion of the graft and the screw placed into the prepared region at the medial epicondylar level.The sutures from the anchor were passed in modified Harris fashion as well and tied for additional fixation. Using a tonsil tip stat dissection for passage of the graft was made distally along the medial joint line. The graft was passed distally and the same process repeated for the second distaly Synthes screw and washer as well as the distal Mitek double loaded anchor. Sutures from the anchor were passed in modified Harris fashion through the graft limbs distally and both limbs of the graft were passed around the screw/washer post construct distally. With the knee at 45 degrees flexion and varus load applied the screw and washer were deployed to capture control of the graft distally. The sutures were tied from the anchor at this time as well. The knee demonstrated normal varus-valgus stress at 45 degrees flexion and good ROM as well.    Following placement of the drain, we then closed the  synovial layer and parapatellar tendon region with a series of #1 Vicryl sutures placed in figure-of-eight fashion. We then closed subcutaneous tissues with 3-0 Vicryl sutures placed in inverted fashion. Skin was closed with running 4-0  Monocryl sutures placed in subcuticular fashion along with application of Dermabond ointment and tape. We did apply intraarticular Exparel for postoperative pain control. We applied Xeroform gauze, ABD pads, cast padding, sterile electrode   pads proximally and distally, a long-leg TEGAN hose stocking and cooling unit. The patient was allowed to recover from the anesthetic. was removed. The   patient was taken to Recovery Room in stable condition. At the completion case, all instrument and sponge counts were correct. Subcutaneous tissues were closed with 3-0 Vicryl sutures placed in inverted fashion. Skin was closed with running 4-0 Monocryl sutures placed in subcuticular fashion along with application of Dermabond ointment and Dermabond tape. We then applied Xeroform gauze, ABD pads, cast padding, a long-leg TEGAN hose stocking and cooling unit. The patient was allowed to recover from the anesthetic. LMA was removed. The patient was taken to Recovery Room in stable condition. At the completion of the case, all instrument and sponge counts were correct.    NOTE: I was present and scrubbed for the key portions of the procedure.    PHYSICAL THERAPY:  The patient should begin physical therapy on postoperative   day # 14 and will be advanced to outpatient therapy as soon as   Possible following discharge.    Weight bearing:partial weight bearing 25-50 % right leg  Range of Motion:no motion for 2-4 weeks    Continuous passive motion (CPM) machine start on   postop day #14 at  10 degrees hyperextension to 40 degrees flexion as tolerated for 6-8 hours per day for 4 weeks.     The patient is to be taken out of the continuous passive motion (CPM)  machine as much as possible and avoid active  assisted range of motion programs.    Immediate specific exercises should include:extension exercises, quadriceps load with a heel roll, prone hang procedures with 5-10 lbs. ankle weights.    Gait program should include: protected gait following symptoms of pain and swelling    Immobilizer should be locked @ 0 degrees with gait, No flexion allowed until return to Dr. Irvin' office in 1-2 weeks.    Discharge home when stable  Follow-up as scheduled  Activity per instruction sheet  Condition Stable

## 2017-07-28 NOTE — ANESTHESIA POSTPROCEDURE EVALUATION
"Anesthesia Post Evaluation    Patient: Camila Black    Procedure(s) Performed: Procedure(s) (LRB):  REPLACEMENT-KNEE-TOTAL with MCL reconstruction (Right)    Final Anesthesia Type: general  Patient location during evaluation: PACU  Patient participation: Yes- Able to Participate  Level of consciousness: awake and alert  Post-procedure vital signs: reviewed and stable  Pain management: adequate  Airway patency: patent  PONV status at discharge: No PONV  Anesthetic complications: no      Cardiovascular status: blood pressure returned to baseline  Respiratory status: unassisted and spontaneous ventilation  Hydration status: euvolemic  Follow-up needed (APS)         Visit Vitals  BP (!) 159/93   Pulse 68   Temp 36.7 °C (98 °F) (Oral)   Resp 17   Ht 5' 6" (1.676 m)   Wt 131.5 kg (290 lb)   SpO2 99%   Breastfeeding? No   BMI 46.81 kg/m²       Pain/Shaan Score: Pain Assessment Performed: Yes (7/28/2017  4:45 PM)  Presence of Pain: complains of pain/discomfort (7/28/2017  4:45 PM)  Pain Rating Prior to Med Admin: 10 (7/28/2017  3:05 PM)  Shaan Score: 9 (7/28/2017  4:45 PM)      "

## 2017-07-28 NOTE — PT/OT/SLP EVAL
Physical Therapy  Evaluation    Camila Black   MRN: [id number]   Admitting Diagnosis: s/p TKA and MCL reconstruction; PWB RLE with knee immobilizer    PT Received On: 07/28/17  PT Start Time: 1540     PT Stop Time: 1600    PT Total Time (min): 20 min       Billable Minutes:  Evaluation 10 and Therapeutic Activity 10    Diagnosis:  s/p TKA and MCL reconstruction; PWB RLE with knee immobilizer    Past Medical History:   Diagnosis Date    Arthritis 2012    OA    B12 deficiency 5/1/2017    Basal cell carcinoma 2/2010    right scalp    Cancer 2010    SKIN CANCER TO SCALP    Hypertension     Morbid obesity with BMI of 40.0-44.9, adult 7/25/2014    S/P gastric bypass 7/13/2015      Past Surgical History:   Procedure Laterality Date    ABDOMINAL SURGERY  2007    GASTRIC BYPASS    C- SECTION X2  1995 AND 2005    DILATION AND CURETTAGE OF UTERUS      ENDOMETRIAL ABLATION      TUBAL LIGATION         Referring physician: Albaro  Date referred to PT: 7/28/2017    General Precautions: Standard, fall  Orthopedic Precautions: RLE partial weight bearing   Braces: Knee immobilizer (RLE)       Do you have any cultural, spiritual, Roman Catholic conflicts, given your current situation?: none stated    Patient History:  Lives With: child(jayro), dependent, spouse  Living Arrangements: house  Home Accessibility: stairs to enter home  Stair Railings at Home: outside, present at both sides  Transportation Available: family or friend will provide  Living Environment Comment: Patient lives with spouse and children in 1-story home [address]. I PTA.  to assist upon DC.  Equipment Currently Used at Home: cane, straight  DME owned (not currently used): none    Previous Level of Function:  Ambulation Skills: independent  Transfer Skills: independent  ADL Skills: independent    Subjective:  Communicated with RN prior to session.  Patient agreeable to PT session  Chief Complaint: no complaints  Patient goals: none  stated    Pain/Comfort  Pain Rating 1: 0/10  Pain Rating Post-Intervention 1: 0/10      Objective:   Patient found with: blood pressure cuff, knee immobilizer, ANTONIO drain, perineural catheter, peripheral IV, pulse ox (continuous), Polar ice, telemetry     Cognitive Exam:  Oriented to: Person, Place, Time and Situation    Follows Commands/attention: Follows multistep  commands  Communication: clear/fluent  Safety awareness/insight to disability: intact    Physical Exam:  Postural examination/scapula alignment: No postural abnormalities identified    Skin integrity: Visible skin intact  Edema: None noted     Sensation:   Intact to light touch    Lower Extremity Range of Motion:  Right Lower Extremity: WFL except knee ROM not tested secondary to acuity of surgery  Left Lower Extremity: WFL    Lower Extremity Strength:  Right Lower Extremity: WFL except knee strength not testesd secondary to acuity of surgery  Left Lower Extremity: WFL     Gross motor coordination: WFL    Functional Mobility:  Bed Mobility:  Rolling/Turning to Left: Minimum assistance  Rolling/Turning Right: Minimum assistance  Scooting/Bridging: Minimum Assistance  Supine to Sit: Minimum Assistance  Sit to Supine: Minimum Assistance    Transfers:  Sit <> Stand Assistance: Contact Guard Assistance  Sit <> Stand Assistive Device: Standard Walker    Gait:   Gait Distance: 3 steps forward/backward/lateral  Assistance 1: Minimum assistance  Gait Assistive Device: Standard walker  Gait Pattern: swing-to gait  Gait Deviation(s): decreased denzel, decreased velocity of limb motion, decreased step length, decreased weight-shifting ability, foot flat (able to maintain PWB precautions RLE)    Balance:   Static Sit: NORMAL: No deviations seen in posture held statically  Dynamic Sit: NORMAL: No deviations seen in posture held dynamically  Static Stand: FAIR: Maintains without assist but unable to take challenges  Dynamic stand: POOR: N/A needs Min  Assist    Therapeutic Activities and Exercises:  Patient educated on role of PT/POC.    Bed mobility with Min Assist  Sit<>stand CGA using standard walker    Gait 3 steps forward/backward/lateral Min Assist for standard walker management  Additional education provided on gait sequencing using standard walker for stepping in each direction.    AM-PAC 6 CLICK MOBILITY  How much help from another person does this patient currently need?   1 = Unable, Total/Dependent Assistance  2 = A lot, Maximum/Moderate Assistance  3 = A little, Minimum/Contact Guard/Supervision  4 = None, Modified [place]/Independent    Turning over in bed (including adjusting bedclothes, sheets and blankets)?: 3  Sitting down on and standing up from a chair with arms (e.g., wheelchair, bedside commode, etc.): 3  Moving from lying on back to sitting on the side of the bed?: 3  Moving to and from a bed to a chair (including a wheelchair)?: 3  Need to walk in hospital room?: 3  Climbing 3-5 steps with a railing?: 2  Total Score: 17     AM-PAC Raw Score CMS G-Code Modifier Level of Impairment Assistance   6 % Total / Unable   7 - 9 CM 80 - 100% Maximal Assist   10 - 14 CL 60 - 80% Moderate Assist   15 - 19 CK 40 - 60% Moderate Assist   20 - 22 CJ 20 - 40% Minimal Assist   23 CI 1-20% SBA / CGA   24 CH 0% Independent/ Mod I     Patient left supine with all lines intact, call button in reach and RN notified.    Assessment:   Camila Black is a 47 y.o. female with a medical diagnosis of  s/p TKA and MCL reconstruction; PWB RLE with knee immobilizer and presents with R knee pain, impaired dynamic balance, and gait instability limiting functional mobility. Demonstrated understanding of gait sequencing using standard walker with competency Min Assist for walker management. To benefit from continued skilled intervention to address deficits prior to transition home with HHPT to improve safety and overall functional mobility.    History:  personal factors and/or comorbidities that impact the plan of care:  s/p TKA and MCL reconstruction; PWB RLE with knee immobilizer  Evaluation of Body Systems: cardiovascular/pulmonary, integumentary, musculoskeletal, neuromuscular, cognition/communication  Functional Outcome Tools: AMPAC, ROM, MMT  Clinical Presentation: stable    Evaluation Complexity Level: Low      Rehab identified problem list/impairments: Rehab identified problem list/impairments: weakness, gait instability, impaired functional mobilty, impaired self care skills, impaired balance, decreased lower extremity function, pain, orthopedic precautions    Rehab potential is good.    Activity tolerance: Good    Discharge recommendations: Discharge Facility/Level Of Care Needs: home health PT     Barriers to discharge: Barriers to Discharge: None    Equipment recommendations: Equipment Needed After Discharge: walker, standard, bedside commode, shower chair     GOALS:    Physical Therapy Goals        Problem: Physical Therapy Goal    Goal Priority Disciplines Outcome Goal Variances Interventions   Physical Therapy Goal     PT/OT, PT Ongoing (interventions implemented as appropriate)     Description:  Goals to be met by: 2017     Patient will increase functional independence with mobility by performin. Supine to sit with Stand-by Assistance  2. Sit to supine with Stand-by Assistance  3. Sit to stand transfer with Stand-by Assistance using standard walker  4. Bed to chair transfer with Contact Guard Assistance using Standard Walker  5. Gait  x 100 feet with Contact Guard Assistance using Standard Walker.   6. Ascend/descend 3 stair with right Handrails Contact Guard Assistance using Standard Walker.                       PLAN:    Patient to be seen BID to address the above listed problems via gait training, therapeutic activities, therapeutic exercises, neuromuscular re-education  Plan of Care expires: 17  Plan of Care reviewed with:  patient          Danilo Aviles III, PT  07/28/2017

## 2017-07-28 NOTE — BRIEF OP NOTE
[facility]    Brief Operative Note     SUMMARY     Surgery Date: 7/28/2017     Surgeon(s) and Role:     * Grace Irvin MD - Primary    Assisting Surgeon: None    Pre-op Diagnosis:  Primary localized osteoarthrosis, lower leg, right [M17.11]    Post-op Diagnosis:  Post-Op Diagnosis Codes:     * Primary localized osteoarthrosis, lower leg, right [M17.11]    Procedure: Procedure(s) (LRB):  REPLACEMENT-KNEE-TOTAL with MCL reconstruction (Right)    Anesthesia: General    Description of the findings of the procedure: See Dictation    Findings/Key Components: Osteoarthritis     Estimated Blood Loss: 350 mL         Specimens:   Specimen (12h ago through future)    None          Disposition: Patient tolerated the procedure well and was transferred to PACU in stable condition.

## 2017-07-28 NOTE — TRANSFER OF CARE
"Anesthesia Transfer of Care Note    Patient: Camila Black    Procedure(s) Performed: Procedure(s) (LRB):  REPLACEMENT-KNEE-TOTAL with MCL reconstruction (Right)    Patient location: PACU    Anesthesia Type: general    Transport from OR: Transported from OR on 6-10 L/min O2 by face mask with adequate spontaneous ventilation    Post pain: adequate analgesia    Post assessment: no apparent anesthetic complications and tolerated procedure well    Post vital signs: stable    Level of consciousness: awake, alert and oriented    Nausea/Vomiting: no nausea/vomiting    Complications: none    Transfer of care protocol was followed      Last vitals:   Visit Vitals  /84   Pulse 78   Temp 36.9 °C (98.4 °F) (Axillary)   Resp 14   Ht 5' 6" (1.676 m)   Wt 131.5 kg (290 lb)   SpO2 100%   Breastfeeding? No   BMI 46.81 kg/m²     "

## 2017-07-29 VITALS
SYSTOLIC BLOOD PRESSURE: 139 MMHG | RESPIRATION RATE: 16 BRPM | HEART RATE: 86 BPM | OXYGEN SATURATION: 97 % | WEIGHT: 290 LBS | DIASTOLIC BLOOD PRESSURE: 72 MMHG | BODY MASS INDEX: 46.61 KG/M2 | TEMPERATURE: 98 F | HEIGHT: 66 IN

## 2017-07-29 LAB
HCT VFR BLD AUTO: 32.4 %
HGB BLD-MCNC: 10.7 G/DL

## 2017-07-29 PROCEDURE — 36415 COLL VENOUS BLD VENIPUNCTURE: CPT

## 2017-07-29 PROCEDURE — 63600175 PHARM REV CODE 636 W HCPCS: Performed by: ORTHOPAEDIC SURGERY

## 2017-07-29 PROCEDURE — 97530 THERAPEUTIC ACTIVITIES: CPT

## 2017-07-29 PROCEDURE — 25000003 PHARM REV CODE 250: Performed by: ORTHOPAEDIC SURGERY

## 2017-07-29 PROCEDURE — 97116 GAIT TRAINING THERAPY: CPT

## 2017-07-29 PROCEDURE — 63600175 PHARM REV CODE 636 W HCPCS: Performed by: ANESTHESIOLOGY

## 2017-07-29 PROCEDURE — 97535 SELF CARE MNGMENT TRAINING: CPT

## 2017-07-29 PROCEDURE — 97110 THERAPEUTIC EXERCISES: CPT

## 2017-07-29 PROCEDURE — 25000003 PHARM REV CODE 250: Performed by: ANESTHESIOLOGY

## 2017-07-29 PROCEDURE — 85014 HEMATOCRIT: CPT

## 2017-07-29 PROCEDURE — 85018 HEMOGLOBIN: CPT

## 2017-07-29 RX ORDER — ASPIRIN 325 MG
325 TABLET ORAL 2 TIMES DAILY
Status: DISCONTINUED | OUTPATIENT
Start: 2017-07-29 | End: 2017-07-29 | Stop reason: HOSPADM

## 2017-07-29 RX ORDER — ASPIRIN 325 MG
325 TABLET ORAL 2 TIMES DAILY
Status: DISCONTINUED | OUTPATIENT
Start: 2017-07-29 | End: 2017-07-29

## 2017-07-29 RX ADMIN — OXYCODONE HYDROCHLORIDE 10 MG: 5 TABLET ORAL at 08:07

## 2017-07-29 RX ADMIN — Medication: at 12:07

## 2017-07-29 RX ADMIN — CELECOXIB 200 MG: 100 CAPSULE ORAL at 08:07

## 2017-07-29 RX ADMIN — ACETAMINOPHEN 1000 MG: 500 TABLET ORAL at 12:07

## 2017-07-29 RX ADMIN — ACETAMINOPHEN 1000 MG: 500 TABLET ORAL at 05:07

## 2017-07-29 RX ADMIN — CEFAZOLIN SODIUM 2 G: 2 SOLUTION INTRAVENOUS at 12:07

## 2017-07-29 RX ADMIN — CYANOCOBALAMIN TAB 1000 MCG 1000 MCG: 1000 TAB at 08:07

## 2017-07-29 RX ADMIN — ASPIRIN 325 MG ORAL TABLET 325 MG: 325 PILL ORAL at 08:07

## 2017-07-29 RX ADMIN — OXYCODONE HYDROCHLORIDE 10 MG: 10 TABLET, FILM COATED, EXTENDED RELEASE ORAL at 08:07

## 2017-07-29 RX ADMIN — OXYCODONE HYDROCHLORIDE 10 MG: 5 TABLET ORAL at 12:07

## 2017-07-29 RX ADMIN — OXYBUTYNIN CHLORIDE 15 MG: 5 TABLET, EXTENDED RELEASE ORAL at 08:07

## 2017-07-29 RX ADMIN — LOSARTAN POTASSIUM 50 MG: 50 TABLET ORAL at 08:07

## 2017-07-29 NOTE — PROGRESS NOTES
"[facility]  Orthopedics  Progress Note    Patient Name: Camila Black  MRN: [id number]  Admission Date: 7/28/2017  Hospital Length of Stay: 1 days  Attending Provider: Grace Irvin MD  Primary Care Provider: Tabitha Pandey MD  Follow-up For: Procedure(s) (LRB):  REPLACEMENT-KNEE-TOTAL with MCL reconstruction (Right)    Post-Operative Day: 1 Day Post-Op  Subjective:     Principal Problem: primary osteoarthritis of right knee    Principal Orthopedic Problem: same    Interval History: AFVSS. NAEON. Pt comfortable on current pain regimen. No N/V. Pt will work with PT today. Drains in place with minimal sanguinous output.    Review of patient's allergies indicates:  No Known Allergies    Current Facility-Administered Medications   Medication    0.9%  NaCl infusion    acetaminophen tablet 1,000 mg    cefazolin (ANCEF) 2 gram in dextrose 5% 50 mL IVPB (premix)    celecoxib capsule 200 mg    cyanocobalamin tablet 1,000 mcg    diphenhydrAMINE injection 25 mg    HYDROmorphone injection 0.5 mg    losartan tablet 50 mg    morphine injection 2 mg    morphine injection 2 mg    morphine injection 2 mg    ondansetron injection 4 mg    oxybutynin 24 hr tablet 15 mg    oxycodone 12 hr tablet 10 mg    oxycodone immediate release tablet 10 mg    oxycodone immediate release tablet 15 mg    oxycodone immediate release tablet 5 mg    pregabalin capsule 150 mg    promethazine (PHENERGAN) 12.5 mg in dextrose 5 % 50 mL IVPB    ropivacaine (PF) 2 mg/ml (0.2%) infusion     Objective:     Vital Signs (Most Recent):  Temp: 98.2 °F (36.8 °C) (07/29/17 0543)  Pulse: 93 (07/29/17 0543)  Resp: 18 (07/29/17 0543)  BP: 128/76 (07/29/17 0543)  SpO2: 98 % (07/29/17 0543) Vital Signs (24h Range):  Temp:  [97.8 °F (36.6 °C)-98.5 °F (36.9 °C)] 98.2 °F (36.8 °C)  Pulse:  [68-97] 93  Resp:  [13-21] 18  SpO2:  [96 %-100 %] 98 %  BP: (128-183)/() 128/76     Weight: 131.5 kg (290 lb)  Height: 5' 6" " (167.6 cm)  Body mass index is 46.81 kg/m².      Intake/Output Summary (Last 24 hours) at 07/29/17 0713  Last data filed at 07/29/17 0600   Gross per 24 hour   Intake          4885.92 ml   Output             1200 ml   Net          3685.92 ml       Ortho/SPM Exam   HEENT: normocephalic, atraumatic  Resp: no increased work of breathing  CV: regular rate and rhythm  MSK: moves all extremities well  RLE: dressings c/d/i, polar care in place; NVI; drains in place with minimal SS drainage    Significant Labs: All pertinent labs within the past 24 hours have been reviewed.    Significant Imaging: None    Assessment/Plan:     * Traumatic arthritis of right knee    - Antibiotics: post-op Ancef  - Weight bearing status: partial weight bearing RLE  - Labs: reviewed  - DVT Prophylaxis:  BID in addition to mechanical  - Lines/Drains:   - Pain control:                Valeria Saleh MD  Orthopedics  [facility]

## 2017-07-29 NOTE — SUBJECTIVE & OBJECTIVE
"Principal Problem: primary osteoarthritis of right knee    Principal Orthopedic Problem: same    Interval History: AFVSS. NAEON. Pt comfortable on current pain regimen. No N/V. Pt will work with PT today. Drains in place with minimal sanguinous output.    Review of patient's allergies indicates:  No Known Allergies    Current Facility-Administered Medications   Medication    0.9%  NaCl infusion    acetaminophen tablet 1,000 mg    cefazolin (ANCEF) 2 gram in dextrose 5% 50 mL IVPB (premix)    celecoxib capsule 200 mg    cyanocobalamin tablet 1,000 mcg    diphenhydrAMINE injection 25 mg    HYDROmorphone injection 0.5 mg    losartan tablet 50 mg    morphine injection 2 mg    morphine injection 2 mg    morphine injection 2 mg    ondansetron injection 4 mg    oxybutynin 24 hr tablet 15 mg    oxycodone 12 hr tablet 10 mg    oxycodone immediate release tablet 10 mg    oxycodone immediate release tablet 15 mg    oxycodone immediate release tablet 5 mg    pregabalin capsule 150 mg    promethazine (PHENERGAN) 12.5 mg in dextrose 5 % 50 mL IVPB    ropivacaine (PF) 2 mg/ml (0.2%) infusion     Objective:     Vital Signs (Most Recent):  Temp: 98.2 °F (36.8 °C) (07/29/17 0543)  Pulse: 93 (07/29/17 0543)  Resp: 18 (07/29/17 0543)  BP: 128/76 (07/29/17 0543)  SpO2: 98 % (07/29/17 0543) Vital Signs (24h Range):  Temp:  [97.8 °F (36.6 °C)-98.5 °F (36.9 °C)] 98.2 °F (36.8 °C)  Pulse:  [68-97] 93  Resp:  [13-21] 18  SpO2:  [96 %-100 %] 98 %  BP: (128-183)/() 128/76     Weight: 131.5 kg (290 lb)  Height: 5' 6" (167.6 cm)  Body mass index is 46.81 kg/m².      Intake/Output Summary (Last 24 hours) at 07/29/17 0713  Last data filed at 07/29/17 0600   Gross per 24 hour   Intake          4885.92 ml   Output             1200 ml   Net          3685.92 ml       Ortho/SPM Exam   HEENT: normocephalic, atraumatic  Resp: no increased work of breathing  CV: regular rate and rhythm  MSK: moves all extremities well  RLE: " dressings c/d/i, polar care in place; NVI; drains in place with minimal SS drainage    Significant Labs: All pertinent labs within the past 24 hours have been reviewed.    Significant Imaging: None

## 2017-07-29 NOTE — PLAN OF CARE
CM advised by Dr. Thrasher pt will d/c home today, needs DMe and HH.     0954- Paged ortho through paging , awaiting a return call to advise this pt has no HH orders.     1000- Spoke with Dr. Nolen, advised I needed HH orders, he stated he would put them in.

## 2017-07-29 NOTE — PLAN OF CARE
Problem: Occupational Therapy Goal  Goal: Occupational Therapy Goal  Goals to be met by: 7 days    Patient will increase functional independence with ADLs by performing:    UE Dressing with Supervision. - Met  LE Dressing with Supervision with AD as needed. - Met  Grooming while standing with Supervision. - Met  Toileting from bedside commode with Supervision for hygiene and clothing management. - Met  Stand pivot transfers with Supervision. - Met  Toilet transfer to bedside commode with Supervision. - Met        Outcome: Outcome(s) achieved Date Met: 07/29/17  Pt has met OT goals.

## 2017-07-29 NOTE — ANESTHESIA POST-OP PAIN MANAGEMENT
Acute Pain Service Progress Note    Camila Black is a 47 y.o., female, [id number].    Surgery:  REPLACEMENT-KNEE-TOTAL with MCL reconstruction (Right Knee)    Post Op Day #: 1    Catheter type: perineural  Adductor    Infusion type: Ropivacaine 0.2%  8 mL/hr basal    Problem List:    Active Hospital Problems    Diagnosis  POA    Traumatic arthritis of right knee [M12.561]  Yes      Resolved Hospital Problems    Diagnosis Date Resolved POA   No resolved problems to display.       Subjective:     General appearance of alert, oriented, no complaints   Pain with rest: 3    Numbers   Pain with movement: 4    Numbers   Side Effects    1. Pruritis No    2. Nausea No    3. Motor Blockade No, 0=Ability to raise lower extremities off bed    4. Sedation No, 1=awake and alert    Objective:     Catheter site clean, dry, intact        Vitals   Vitals:    07/29/17 0543   BP: 128/76   Pulse: 93   Resp: 18   Temp: 36.8 °C (98.2 °F)        Labs    Admission on 07/28/2017   Component Date Value Ref Range Status    POC Preg Test, Ur 07/28/2017 Negative  Negative Final     Acceptable 07/28/2017 Yes   Final    Indirect Francy 07/28/2017 NEG   Final    Prothrombin Time 07/28/2017 10.5  9.0 - 12.5 sec Final    INR 07/28/2017 1.0  0.8 - 1.2 Final    ABO 07/28/2017 O   Final    Rh Type 07/28/2017 POS   Final    Hemoglobin 07/29/2017 10.7* 12.0 - 16.0 g/dL Final    Hematocrit 07/29/2017 32.4* 37.0 - 48.5 % Final        Meds   Current Facility-Administered Medications   Medication Dose Route Frequency Provider Last Rate Last Dose    0.9%  NaCl infusion   Intravenous Continuous Tramaine Linder  mL/hr at 07/28/17 1545      acetaminophen tablet 1,000 mg  1,000 mg Oral Q6H Silvia Mckeon MD   1,000 mg at 07/29/17 0019    aspirin tablet 325 mg  325 mg Oral BID Valeria Saleh MD        cefazolin (ANCEF) 2 gram in dextrose 5% 50 mL IVPB (premix)  2 g Intravenous Q6H Malik Salazar  mL/hr  at 07/29/17 0015 2 g at 07/29/17 0015    celecoxib capsule 200 mg  200 mg Oral Daily Silvia Mckeon MD        cyanocobalamin tablet 1,000 mcg  1,000 mcg Oral Daily Malik Salazar MD   1,000 mcg at 07/28/17 1522    diphenhydrAMINE injection 25 mg  25 mg Intravenous Q6H PRN Valeria Saleh MD   25 mg at 07/28/17 1744    HYDROmorphone injection 0.5 mg  0.5 mg Intravenous Q2H PRN Malik Salazar MD        losartan tablet 50 mg  50 mg Oral Daily Malik Salazar MD        morphine injection 2 mg  2 mg Intravenous Q1H PRN Silvia Mckeon MD        morphine injection 2 mg  2 mg Intravenous Q1H PRN Silvia Mckeon MD        morphine injection 2 mg  2 mg Intravenous Q1H PRN Silvia Mckeon MD        ondansetron injection 4 mg  4 mg Intravenous Q8H PRN Sterling Kirk MD        oxybutynin 24 hr tablet 15 mg  15 mg Oral Daily Malik Salazar MD   15 mg at 07/28/17 1521    oxycodone 12 hr tablet 10 mg  10 mg Oral Q12H Malik Salazar MD   10 mg at 07/28/17 2049    oxycodone immediate release tablet 10 mg  10 mg Oral Q3H PRN Silvia Mckeon MD        oxycodone immediate release tablet 15 mg  15 mg Oral Q3H PRN Silvia Mckeon MD   15 mg at 07/28/17 2047    oxycodone immediate release tablet 5 mg  5 mg Oral Q3H PRN Silvia Mkceon MD        pregabalin capsule 150 mg  150 mg Oral QHS Silvia Mckeon MD   150 mg at 07/28/17 2049    promethazine (PHENERGAN) 12.5 mg in dextrose 5 % 50 mL IVPB  12.5 mg Intravenous Q4H PRN Malik Salazar MD        ropivacaine (PF) 2 mg/ml (0.2%) infusion  8 mL/hr Perineural Continuous Silvia Mckeon MD 8 mL/hr at 07/28/17 1435 8 mL at 07/28/17 1435         Assessment:     Pain control adequate    Plan:     Patient doing well, continue present treatment. Will discharge home with [facility] for 48 hrs.    Evaluator Evette Bassett          Patient seen with Dr. Bassett and agree with assesment and plan.

## 2017-07-29 NOTE — NURSING
Pt d/c'ed in stable condition, vss. IV removed, tip intact. Instructions given to pt, states understanding.

## 2017-07-29 NOTE — PLAN OF CARE
10:23 AM. AR spoke with pt in pts room to confirm pt needs ordered DME. Pt requesting a transfer tub bench, aware this is an out of pocket expense. Ortho updated. Pt request [facility]. [facility]ing [facility] to [facility]. Faxing DME orders to [facility]. Once equipment is delivered pt is cleared to DC from the case management stance. Pts  to provide transportation home.         CARLO García LMSW  [id number]

## 2017-07-29 NOTE — PT/OT/SLP PROGRESS
"Occupational Therapy  Treatment / Discharge Summary    Camila Black   MRN: [id number]   Admitting Diagnosis: Traumatic arthritis of right knee    OT Date of Treatment: 07/29/17       Billable Minutes:  Self Care/Home Management 38    General Precautions: Standard, fall  Orthopedic Precautions: RLE partial weight bearing  Braces: Knee immobilizer    Subjective:  Communicated with NSG prior to session.  "I'm ready to go home."  Pain/Comfort  Pain Rating 1: 3/10  Location - Side 1: Right  Location 1: knee  Pain Rating Post-Intervention 1: 3/10    Objective:  Patient found with: knee immobilizer, Polar ice     Functional Mobility:  Bed Mobility:  Sit to Supine: Minimum Assistance    Transfers:   Sit <> Stand Assistance: Supervision  Sit <> Stand Assistive Device: Standard Walker  Bed <> Chair Technique: Stand Pivot  Bed <> Chair Transfer Assistance: Supervision  Bed <> Chair Assistive Device: Standard Walker  Toilet Transfer Technique:  (functional mobility)  Toilet Transfer Assistance: Supervision  Toilet Transfer Assistive Device: Standard Walker    Activities of Daily Living:  Feeding Level of Assistance: Independent  UE Dressing Level of Assistance: Set-up Assistance  LE Dressing Level of Assistance: Supervision  Grooming Position: Standing  Grooming Level of Assistance: Supervision  Toileting Where Assessed: Bedside commode (placed over toilet)  Toileting Level of Assistance: Supervision      Therapeutic Activities and Exercises:  Pt educated on polar ice, knee immobilizer, adjustment of DME, HH.    AM-PAC 6 CLICK ADL   How much help from another person does this patient currently need?   1 = Unable, Total/Dependent Assistance  2 = A lot, Maximum/Moderate Assistance  3 = A little, Minimum/Contact Guard/Supervision  4 = None, Modified [place]/Independent    Putting on and taking off regular lower body clothing? : 4  Bathing (including washing, rinsing, drying)?: 4  Toileting, which includes using " "toilet, bedpan, or urinal? : 4  Putting on and taking off regular upper body clothing?: 4  Taking care of personal grooming such as brushing teeth?: 4  Eating meals?: 4  Total Score: 24     AM-PAC Raw Score CMS "G-Code Modifier Level of Impairment Assistance   6 % Total / Unable   7 - 8 CM 80 - 100% Maximal Assist   9-13 CL 60 - 80% Moderate Assist   14 - 19 CK 40 - 60% Moderate Assist   20 - 22 CJ 20 - 40% Minimal Assist   23 CI 1-20% SBA / CGA   24 CH 0% Independent/ Mod I     Patient left supine with all lines intact and call button in reach    ASSESSMENT:  Camila Black is a 47 y.o. female with a medical diagnosis of Traumatic arthritis of right knee. Pt is safe to d/c home w/ family.    Discharge recommendations: Discharge Facility/Level Of Care Needs: home health PT     Barriers to discharge: Barriers to Discharge: None    Equipment recommendations: bedside commode, bath bench, walker, standard     GOALS:    Occupational Therapy Goals     Not on file          Multidisciplinary Problems (Resolved)        Problem: Occupational Therapy Goal    Goal Priority Disciplines Outcome Interventions   Occupational Therapy Goal   (Resolved)     OT, PT/OT Outcome(s) achieved    Description:  Goals to be met by: 7 days    Patient will increase functional independence with ADLs by performing:    UE Dressing with Supervision. - Met  LE Dressing with Supervision with AD as needed. - Met  Grooming while standing with Supervision. - Met  Toileting from bedside commode with Supervision for hygiene and clothing management. - Met  Stand pivot transfers with Supervision. - Met  Toilet transfer to bedside commode with Supervision. - Met                      Plan:  D/C acute OT services.  Plan of Care reviewed with: patient  LILLY Pleitez  07/29/2017  "

## 2017-07-29 NOTE — PT/OT/SLP PROGRESS
Physical Therapy  Treatment    Camila Black   MRN: [id number]   Admitting Diagnosis: Traumatic arthritis of right knee    PT Received On: 07/29/17  PT Start Time: 0813     PT Stop Time: 0907    PT Total Time (min): 54 min       Billable Minutes:  Gait [id number], Therapeutic Activity 15 and Therapeutic Exercise 14    Treatment Type: Treatment  PT/PTA: PTA     PTA Visit Number: 1       General Precautions: Standard, fall  Orthopedic Precautions: RLE partial weight bearing   Braces: Knee immobilizer    Do you have any cultural, spiritual, Evangelical conflicts, given your current situation?: none stated    Subjective:  Communicated with NSG prior to session.  Patient agreeable to therapy    Pain/Comfort  Pain Rating 1: 3/10  Location - Side 1: Right  Location 1: knee    Objective:   Patient found with: hemovac, perineural catheter, FCD, Polar ice    Functional Mobility:  Bed Mobility:   Supine to Sit: Stand by Assistance  Sit to Supine: Minimum Assistance (for R LE)    Transfers:  Sit <> Stand Assistance: Stand By Assistance  Sit <> Stand Assistive Device: Standard Walker    Gait:   Gait Distance: 15feet,75 feet and 50 feet  Assistance 1: Stand by Assistance, Contact Guard Assistance  Gait Assistive Device: Standard walker  Gait Pattern: partial weightbearing  Gait Deviation(s): decreased denzel, increased time in double stance, decreased velocity of limb motion, decreased step length, decreased stride length (no LOB, occasional VC for walker mgmnt and proper step length. patient able to maintain WB'ing precautions)    Stairs:  Ascend and descend 7inch curb step x 2 trials ( BWD approach) with RW CGA. VC's for sequence and technique     Balance:   Static Sit: NORMAL: No deviations seen in posture held statically  Dynamic Sit: NORMAL: No deviations seen in posture held dynamically  Static Stand: GOOD-: Takes MODERATE challenges from all directions inconsistently  Dynamic stand: FAIR+: Needs CLOSE SUPERVISION  during gait and is able to right self with minor LOB     Therapeutic Activities and Exercises:  Patient performed AP,GS,QS,hip ABD/ADD and SLR ( with assistance) x15 reps  Patient given HEP  Patient educated on car transfers  Patient educated on safety with OOB mobility  Patient's Hemovac drain disconnected during gait, but was reconnected and suction was applied.   ADWOA and MD notified and aware    AM-PAC 6 CLICK MOBILITY  How much help from another person does this patient currently need?   1 = Unable, Total/Dependent Assistance  2 = A lot, Maximum/Moderate Assistance  3 = A little, Minimum/Contact Guard/Supervision  4 = None, Modified [place]/Independent    Turning over in bed (including adjusting bedclothes, sheets and blankets)?: 4  Sitting down on and standing up from a chair with arms (e.g., wheelchair, bedside commode, etc.): 4  Moving from lying on back to sitting on the side of the bed?: 4  Moving to and from a bed to a chair (including a wheelchair)?: 4  Need to walk in hospital room?: 4  Climbing 3-5 steps with a railing?: 3  Total Score: 23    AM-PAC Raw Score CMS G-Code Modifier Level of Impairment Assistance   6 % Total / Unable   7 - 9 CM 80 - 100% Maximal Assist   10 - 14 CL 60 - 80% Moderate Assist   15 - 19 CK 40 - 60% Moderate Assist   20 - 22 CJ 20 - 40% Minimal Assist   23 CI 1-20% SBA / CGA   24 CH 0% Independent/ Mod I     Patient left up in chair with all lines intact and call button in reach.    Assessment:  Camila Black is a 47 y.o. female with a medical diagnosis of Traumatic arthritis of right knee and presents with decrease strength, mobility, balance and ROM. Patient tolerated increase distance with gait training well. Patient demonstrated good balance with OOB mobility using SW.  Patient able to maintain WB'ing precautions without assistance.  Patient will require family assistance upon discharge from hospital.     Rehab identified problem list/impairments:  Rehab identified problem list/impairments: weakness, gait instability, impaired balance, pain, decreased ROM, orthopedic precautions, decreased lower extremity function, impaired functional mobilty, impaired self care skills    Rehab potential is good.    Activity tolerance: Fair    Discharge recommendations:       Barriers to discharge: Barriers to Discharge: None (patient stated her  will be able to assist her and built a ramp to get inside her home.)    Equipment recommendations: Equipment Needed After Discharge: bedside commode, walker, standard, shower chair (Bariatric)     GOALS:    Physical Therapy Goals        Problem: Physical Therapy Goal    Goal Priority Disciplines Outcome Goal Variances Interventions   Physical Therapy Goal     PT/OT, PT Ongoing (interventions implemented as appropriate)     Description:  Goals to be met by: 2017     Patient will increase functional independence with mobility by performin. Supine to sit with Stand-by Assistance  2. Sit to supine with Stand-by Assistance  3. Sit to stand transfer with Stand-by Assistance using standard walker  4. Bed to chair transfer with Contact Guard Assistance using Standard Walker  5. Gait  x 100 feet with Contact Guard Assistance using Standard Walker.   6. Ascend/descend 3 stair with right Handrails Contact Guard Assistance using Standard Walker.                       PLAN:    Patient to be seen BID  to address the above listed problems via gait training, therapeutic activities, therapeutic exercises, neuromuscular re-education  Plan of Care expires: 17  Plan of Care reviewed with: patient         Nicholas Vergara II, PTA  2017

## 2017-07-29 NOTE — PLAN OF CARE
Problem: Physical Therapy Goal  Goal: Physical Therapy Goal  Goals to be met by: 2017     Patient will increase functional independence with mobility by performin. Supine to sit with Stand-by Assistance  2. Sit to supine with Stand-by Assistance  3. Sit to stand transfer with Stand-by Assistance using standard walker  4. Bed to chair transfer with Contact Guard Assistance using Standard Walker  5. Gait  x 100 feet with Contact Guard Assistance using Standard Walker.   6. Ascend/descend 3 stair with right Handrails Contact Guard Assistance using Standard Walker.      Patient goals remain appropriate.  Patient will continue to benefit from further PT services.  Nicholas Vergara, PTA

## 2017-07-29 NOTE — ADDENDUM NOTE
Addendum  created 07/29/17 0930 by Evette Bassett MD    Order list changed, Order sets accessed, Sign clinical note

## 2017-07-29 NOTE — PLAN OF CARE
[facility]    HOME HEALTH ORDERS  FACE TO FACE ENCOUNTER    Patient Name: Camila Black  YOB: 1970    PCP: Tabitha Pandey MD   PCP Address: [address] / [place] [address]  PCP Phone Number: [phone]  PCP Fax: [phone]    Encounter Date: 07/29/2017    Admit to Home Health    Diagnoses:  Active Hospital Problems    Diagnosis  POA    *Traumatic arthritis of right knee [M12.561]  Yes      Resolved Hospital Problems    Diagnosis Date Resolved POA   No resolved problems to display.       Future Appointments  Date Time Provider Department Center   8/1/2017 11:00 AM Bailey Robles, PT [facility]   8/2/2017 2:30 PM Grace Irvin MD [facility]   8/11/2017 12:00 PM Grace Irvin MD [facility]   9/8/2017 9:30 AM Cecille Gillis PA-C [facility]     Follow-up Information     Grace Irvin MD On 8/2/2017.    Specialties:  Sports Medicine, Orthopedic Surgery  Contact information:  [address] [place] [address]  [phone]                     I have seen and examined this patient face to face today. My clinical findings that support the need for the home health skilled services and home bound status are the following:  Weakness/numbness causing balance and gait disturbance due to Joint Replacement making it taxing to leave home.    Allergies:Review of patient's allergies indicates:  No Known Allergies    Diet: regular diet    Activities: activity as tolerated    Nursing:   SN to complete comprehensive assessment including routine vital signs. Instruct on disease process and s/s of complications to report to MD. Follow specific home health arthoplasty protocol. Review/verify medication list sent home with the patient at time of discharge  and instruct patient/caregiver as needed. If coumadin ordered, coumadin clinic to manage INR with INR draws 2x per week with a goal to maintain INR between 1.8 and 2.2.  Frequency may be adjusted depending on start of care date.    Notify MD if SBP > 160 or < 90; DBP > 90 or < 50; HR > 120 or < 50; Temp > 101    Home Medical Equipment:  Walker, 3-1 bedside commode, transfer tub bench    CONSULTS:    Physical Therapy to evaluate and treat. Evaluate for home safety and equipment needs; Establish/upgrade home exercise program. Perform / instruct on therapeutic exercises, gait training, transfer training, and Range of Motion.    WOUND CARE ORDERS  Dressings to remain clean, dry, intact until postop visit.    Medications: Review discharge medications with patient and family and provide education.      Current Discharge Medication List      CONTINUE these medications which have NOT CHANGED    Details   cholecalciferol, vitamin D3, 1,000 unit capsule Take 1 capsule (1,000 Units total) by mouth once daily.  Qty: 30 capsule, Refills: 12      cyanocobalamin (VITAMIN B-12) 1000 MCG tablet Take 1 tablet (1,000 mcg total) by mouth once daily.  Qty: 30 tablet, Refills: 12    Associated Diagnoses: B12 deficiency anemia      econazole nitrate 1 % cream Apply topically once daily. Mix in hydrocortisone cream and aaa qhs  Qty: 85 g, Refills: 1    Associated Diagnoses: Intertrigo      losartan (COZAAR) 50 MG tablet Take 1 tablet (50 mg total) by mouth once daily.  Qty: 90 tablet, Refills: 3    Associated Diagnoses: Essential hypertension      multivitamin capsule Take 1 capsule by mouth once daily.        oxybutynin (DITROPAN XL) 15 MG TR24 Take 1 tablet (15 mg total) by mouth once daily.  Qty: 30 tablet, Refills: 0    Comments: Patient needs a follow up appointment  Associated Diagnoses: Urinary urgency; Urge incontinence      aspirin (ECOTRIN) 325 MG EC tablet Take 1 tablet (325 mg total) by mouth once daily.  Qty: 42 tablet, Refills: 0    Associated Diagnoses: Traumatic arthritis of right knee      ondansetron (ZOFRAN) 4 MG tablet Take 1 tablet (4 mg total) by mouth every 8 (eight) hours as needed  for Nausea.  Qty: 30 tablet, Refills: 1    Associated Diagnoses: Traumatic arthritis of right knee      oxycodone-acetaminophen (PERCOCET)  mg per tablet Take 1 tablet by mouth every 6 (six) hours as needed for Pain.  Qty: 60 tablet, Refills: 0    Associated Diagnoses: Traumatic arthritis of right knee      triamcinolone acetonide 0.1% (KENALOG) 0.1 % ointment 1 application 2 (two) times daily. Apply to affected area  Refills: 12             I certify that this patient is confined to her home and needs intermittent skilled nursing care and physical therapy.

## 2017-07-29 NOTE — ASSESSMENT & PLAN NOTE
- Antibiotics: post-op Ancef  - Weight bearing status: partial weight bearing RLE  - Labs: reviewed  - DVT Prophylaxis:  BID in addition to mechanical  - Lines/Drains:   - Pain control:

## 2017-07-30 NOTE — PROGRESS NOTES
Called and spoke to Mrs. Black. She states she is doing well and that her pain is well controlled. Denies any pain or swelling around the insertion site. Plan is to remove the catheter after 12:30 pm tomorrow.     Sterling Mejia MD  [facility]

## 2017-07-30 NOTE — DISCHARGE SUMMARY
"[facility]  Orthopedics  Discharge Summary      Patient Name: Camila Black  MRN: [id number]  Admission Date: 7/28/2017  Hospital Length of Stay: 1 days  Discharge Date and Time: 7/29/2017  1:50 PM  Attending Physician: Grace Irvin MD  Discharging Provider: Valeria Saleh MD  Primary Care Provider: Tabitha Pandey MD    HPI:   48 yo with traumatic osteoarthritis of right knee.    Procedure(s) (LRB):  REPLACEMENT-KNEE-TOTAL with MCL reconstruction (Right)      Hospital Course:  Patient presented for above procedure.  Tolerated it well and was discharged home POD1 after voiding, tolerating diet, ambulating, pain controlled.  Discharge instructions, follow-up appointment, and med rec are below.      Significant Diagnostic Studies: No pertinent studies.    Pending Diagnostic Studies:     None        Final Active Diagnoses:    Diagnosis Date Noted POA    PRINCIPAL PROBLEM:  Traumatic arthritis of right knee [M12.561] 07/28/2017 Yes      Problems Resolved During this Admission:    Diagnosis Date Noted Date Resolved POA      Discharged Condition: good    Disposition: Home or Self Care    Follow Up:  Follow-up Information     Grace Irvin MD On 8/2/2017.    Specialties:  Sports Medicine, Orthopedic Surgery  Contact information:  [facility] [facility] [id number] [phone]                 Patient Instructions:     WALKER FOR HOME USE   Order Specific Question Answer Comments   Type of Walker: Adult (5'4"-6'6")    With wheels? No    Height: 5' 6" (1.676 m)    Weight: 131.5 kg (290 lb)    Length of need (1-99 months): 99    Does patient have medical equipment at home? cane, straight    Please check all that apply: Patient's condition impairs ambulation.    Please check all that apply: Walker will be used for gait training.      COMMODE FOR HOME USE   Order Comments: Bariatric commode   Order Specific Question Answer Comments   Type: Heavy duty    Height: 5' 6" (1.676 m)    Weight: " "131.5 kg (290 lb)    Does patient have medical equipment at home? cane, straight    Length of need (1-99 months): 99      TRANSFER TUB BENCH FOR HOME USE   Order Specific Question Answer Comments   Type of Transfer Tub Bench: Unpadded    Height: 5' 6" (1.676 m)    Weight: 131.5 kg (290 lb)    Does patient have medical equipment at home? cane, straight    Length of need (1-99 months): 99      Diet general     Activity as tolerated     Sponge bath only until clinic visit     Keep surgical extremity elevated     Ice to affected area     Lifting restrictions   Order Comments: No strenuous exercise or lifting of >5 pounds     Weight bearing restrictions (specify)   Order Comments: Weightbearing as tolerated     Call MD for:  temperature >100.4     Call MD for:  persistent nausea and vomiting or diarrhea     Call MD for:  severe uncontrolled pain     Call MD for:  redness, tenderness, or signs of infection (pain, swelling, redness, odor or green/yellow discharge around incision site)     Call MD for:  difficulty breathing or increased cough     Call MD for:  severe persistent headache     Call MD for:  worsening rash     Call MD for:  persistent dizziness, light-headedness, or visual disturbances     Call MD for:  increased confusion or weakness     Leave dressing on - Keep it clean, dry, and intact until clinic visit       Medications:  Reconciled Home Medications:   Discharge Medication List as of 7/29/2017 11:39 AM      CONTINUE these medications which have NOT CHANGED    Details   aspirin (ECOTRIN) 325 MG EC tablet Take 1 tablet (325 mg total) by mouth once daily., Starting Mon 7/24/2017, Until Sun 8/13/2017, Normal      cholecalciferol, vitamin D3, 1,000 unit capsule Take 1 capsule (1,000 Units total) by mouth once daily., Starting 2/16/2017, Until Discontinued, Normal      cyanocobalamin (VITAMIN B-12) 1000 MCG tablet Take 1 tablet (1,000 mcg total) by mouth once daily., Starting 7/14/2015, Until Discontinued, No " Print      econazole nitrate 1 % cream Apply topically once daily. Mix in hydrocortisone cream and aaa qhs, Starting 11/23/2016, Until Discontinued, Normal      losartan (COZAAR) 50 MG tablet Take 1 tablet (50 mg total) by mouth once daily., Starting 5/1/2017, Until Tue 5/1/18, Normal      multivitamin capsule Take 1 capsule by mouth once daily.  , Until Discontinued, Historical Med      ondansetron (ZOFRAN) 4 MG tablet Take 1 tablet (4 mg total) by mouth every 8 (eight) hours as needed for Nausea., Starting Mon 7/24/2017, Until Mon 7/31/2017, Normal      oxybutynin (DITROPAN XL) 15 MG TR24 Take 1 tablet (15 mg total) by mouth once daily., Starting Wed 7/26/2017, Normal      oxycodone-acetaminophen (PERCOCET)  mg per tablet Take 1 tablet by mouth every 6 (six) hours as needed for Pain., Starting Mon 7/24/2017, Normal      triamcinolone acetonide 0.1% (KENALOG) 0.1 % ointment 1 application 2 (two) times daily. Apply to affected area, Starting Wed 6/28/2017, Historical Med             Valeria Saleh MD  Orthopedics  [facility]

## 2017-07-31 NOTE — PROGRESS NOTES
Called pt at home to confirm successful removal of PNC. Pt stated her cather was removed with blue tip intact without issues. I informed pt to contact [facility] in the event that any problems or questions arose.     Nikolai Barnes MD  [facility]

## 2017-08-01 ENCOUNTER — PATIENT OUTREACH (OUTPATIENT)
Dept: ADMINISTRATIVE | Facility: CLINIC | Age: 47
End: 2017-08-01

## 2017-08-01 LAB
HPV HR 12 DNA CVX QL NAA+PROBE: NEGATIVE
HPV16 DNA SPEC QL NAA+PROBE: NEGATIVE
HPV18 DNA SPEC QL NAA+PROBE: NEGATIVE

## 2017-08-01 NOTE — PT/OT/SLP DISCHARGE
Physical Therapy Discharge Summary    Camila Black  MRN: [id number]   Traumatic arthritis of right knee   Patient Discharged from acute Physical Therapy on 2017.  Please refer to prior PT noted date on 2017 for functional status.     Assessment:   Patient appropriate for care in another setting.  GOALS:    Physical Therapy Goals     Not on file          Multidisciplinary Problems (Resolved)        Problem: Physical Therapy Goal    Goal Priority Disciplines Outcome Goal Variances Interventions   Physical Therapy Goal   (Resolved)     PT/OT, PT Outcome(s) achieved     Description:  Goals to be met by: 2017     Patient will increase functional independence with mobility by performin. Supine to sit with Stand-by Assistance  2. Sit to supine with Stand-by Assistance  3. Sit to stand transfer with Stand-by Assistance using standard walker  4. Bed to chair transfer with Contact Guard Assistance using Standard Walker  5. Gait  x 100 feet with Contact Guard Assistance using Standard Walker.   6. Ascend/descend 3 stair with right Handrails Contact Guard Assistance using Standard Walker.                     Reasons for Discontinuation of Therapy Services  Transfer to alternate level of care.      Plan:  Patient Discharged to: Home with Home Health Service.    Danilo Aviles III, KARTIK  2017

## 2017-08-01 NOTE — PATIENT INSTRUCTIONS
Discharge Instructions for Total Knee Replacement  You have undergone knee replacement surgery. The knee joint forms where the thighbone, shinbone, and kneecap meet. The knee joint is supported by muscles and ligaments, and is lined with a cushioning called cartilage. Over time, cartilage wears away. This can make the knee feel stiff and painful. Your doctor replaced your painful joint with an artificial joint to relieve pain and restore movement. Here are some instructions to follow once at home.  Home care  · When your doctor says it's OK to shower, carefully wash your incision with soap and water. Rinse the incision well. Then gently pat it dry. Dont rub the incision, or apply creams or lotions. Sit on a shower stool or chair when you shower to keep from falling.  · Take pain medicine as directed by your doctor.  Sitting and sleeping  · Sit in chairs with arms. The arms make it easier for you to stand up or sit down.  · Dont sit for more than 30 to 45 minutes at one time.  · Nap if you are tired, but dont stay in bed all day.  · Sleep with a pillow under your ankle, not your knee. Be sure to change the position of your leg during the night.  Moving safely  · The key to successful recovery is movement with walking and exercising your knee as directed by your doctor. You should be able to start moving your leg shortly after surgery as directed by your doctor.    · Walk up and down stairs with support. Try one step at a time. Use the railing if possible.  · Dont drive until your doctor says its OK. Most people can start driving about 6 weeks after surgery. Dont drive while you are taking opioid pain medication.  Other precautions  · Avoid soaking your knee in water (no hot tubs, bathtubs, swimming pools) until your doctor says its OK.  · Wear the support stockings you were given in the hospital, as instructed by your doctor. You may wear these stockings for 4 to 6 weeks after surgery. If needed, you can  place a bandage over the incision to prevent irritation from clothing or support stockings.  · Arrange your household to keep the items you need handy. Keep everything else out of the way. Remove items that may cause you to fall, such as throw rugs and electrical cords.  · Use nonslip bath mats, grab bars, an elevated toilet seat, and a shower chair in your bathroom.  · Until your balance, flexibility, and strength improve, use a cane, crutches, a walker, handrails, or someone to help you.  · Keep your hands free by using a backpack, angel pack, apron, or pockets to carry things.  · Prevent infection. Ask your doctor for instructions if you havent already received them. Any infection will need to be treated immediately. Call your doctor right away if you think you might have an infection.  · Tell your dentist that you have an artificial joint and take antibiotics as prescribed before any dental work.  · Tell all your healthcare providers about your artificial joint before any medical procedure.  · Maintain a healthy weight. Get help to lose any extra pounds. Added body weight puts stress on the knee.  · Take any medicine you may have been given after surgery. This may include blood-thinning medicine to prevent blood clots or antibiotics to prevent infection.  Follow-up care  Follow up with your healthcare provider, or as advised. You will need to have your staples removed 2 to 3 weeks after surgery.     When to call your healthcare provider  Call 911 right away if you have:  · Chest pain  · Shortness of breath  · Any pain or tenderness in your calf  Otherwise, call your healthcare provider right away if you have:  · Fever of 100.4 °F (38 °C) or higher, or as advised  · Shaking chills  · Stiffness, or inability to move the knee  · Increased swelling in your leg  · Increased redness, tenderness, or swelling in or around the knee incision  · Drainage from the knee incision  · Increased knee pain   Date Last Reviewed:  7/1/2016  © 5333-0015 [employer]. [address] [place] [address], [place] [address]. All rights reserved. This information is not intended as a substitute for professional medical care. Always follow your healthcare professional's instructions.

## 2017-08-02 ENCOUNTER — OFFICE VISIT (OUTPATIENT)
Dept: SPORTS MEDICINE | Facility: CLINIC | Age: 47
End: 2017-08-02
Payer: COMMERCIAL

## 2017-08-02 ENCOUNTER — HOSPITAL ENCOUNTER (OUTPATIENT)
Dept: RADIOLOGY | Facility: HOSPITAL | Age: 47
Discharge: HOME | End: 2017-08-02
Attending: ORTHOPAEDIC SURGERY
Payer: COMMERCIAL

## 2017-08-02 VITALS
SYSTOLIC BLOOD PRESSURE: 112 MMHG | BODY MASS INDEX: 46.61 KG/M2 | DIASTOLIC BLOOD PRESSURE: 75 MMHG | WEIGHT: 290 LBS | HEIGHT: 66 IN | HEART RATE: 95 BPM

## 2017-08-02 DIAGNOSIS — M25.569 KNEE PAIN, UNSPECIFIED CHRONICITY, UNSPECIFIED LATERALITY: Primary | ICD-10-CM

## 2017-08-02 DIAGNOSIS — M25.569 KNEE PAIN, UNSPECIFIED CHRONICITY, UNSPECIFIED LATERALITY: ICD-10-CM

## 2017-08-02 DIAGNOSIS — M17.11 PRIMARY LOCALIZED OSTEOARTHROSIS, LOWER LEG, RIGHT: Chronic | ICD-10-CM

## 2017-08-02 PROCEDURE — 73560 X-RAY EXAM OF KNEE 1 OR 2: CPT | Mod: TC,PO,RT

## 2017-08-02 PROCEDURE — 73560 X-RAY EXAM OF KNEE 1 OR 2: CPT | Mod: 26,RT,, | Performed by: RADIOLOGY

## 2017-08-02 PROCEDURE — 99024 POSTOP FOLLOW-UP VISIT: CPT | Mod: S$GLB,,, | Performed by: ORTHOPAEDIC SURGERY

## 2017-08-02 PROCEDURE — 99999 PR PBB SHADOW E&M-EST. PATIENT-LVL III: CPT | Mod: PBBFAC,,, | Performed by: ORTHOPAEDIC SURGERY

## 2017-08-02 NOTE — PROGRESS NOTES
Subjective:          Chief Complaint: Camila Black is a 47 y.o. female who had concerns including Pain of the Right Knee.    Patient is here for a follow up for her right knee.     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  Replacement, Knee, Medial and Lateral compartment (Total Knee) 43049, Complex  2.  Medial Collateral Ligament Reconstruction, (Extra-articular), 39724  Complexity of the case was increased due to the patient's BMI / body morphology, chronic nature of the problem and the degree of preoperative genu valgum noted.         Pain   Associated symptoms include joint swelling. Pertinent negatives include no abdominal pain, chest pain, chills, congestion, coughing, fever, headaches, myalgias, nausea, numbness, rash, sore throat or vomiting.      Patient presents to clinic for f/u following right knee Synvisc One injection 6 months ago on 8/11/16 by Dr. Irvin. She reports significant relief of pain with the visco supplementation. She has been taking [id number] twice a day and ibuprofen prn for pain relief. Pain today is 5/10. Pain is at its worst at 8/10 when it rains. Pain is worse with walking. Loud clicking while walking is concerning for pt. She is unable to run if she wants to and is no longer able to participate in [employer]. She s not wearing her lateral  brace today.    Review of Systems   Constitution: Negative. Negative for chills, fever, weight gain and weight loss.   HENT: Negative for congestion, headaches and sore throat.    Eyes: Negative for blurred  vision and double vision.   Cardiovascular: Negative for chest pain, leg swelling and palpitations.   Respiratory: Negative for cough and shortness of breath.    Skin: Negative for itching, poor wound healing and rash.   Musculoskeletal: Positive for joint pain, joint swelling, muscle weakness and stiffness. Negative for back pain and myalgias.   Gastrointestinal: Negative for abdominal pain, constipation, diarrhea, nausea and vomiting.   Genitourinary: Negative.  Negative for frequency and hematuria.   Neurological: Negative for dizziness, numbness, paresthesias and sensory change.   Psychiatric/Behavioral: Negative for altered mental status and depression. The patient is not nervous/anxious.    Allergic/Immunologic: Negative for hives.       Pain Related Questions  Over the past 3 days, what was your average pain during activity? (I.e. running, jogging, walking, climbing stairs, getting dressed, ect.): 4  Over the past 3 days, what was your highest pain level?: 4  Over the past 3 days, what was your lowest pain level? : 2    Other  How many nights a week are you awakened by your affected body part?: 0  Was the patient's HEIGHT measured or patient reported?: Patient Reported  Was the patient's WEIGHT measured or patient reported?: Patient Reported      Objective:        General: Camila is well-developed, well-nourished, appears stated age, in no acute distress, alert and oriented to time, place and person.     General    Vitals reviewed.  Constitutional: She is oriented to person, place, and time. She appears well-developed and well-nourished. No distress.   HENT:   Head: Normocephalic and atraumatic.   Mouth/Throat: No oropharyngeal exudate.   Eyes: EOM are normal. Right eye exhibits no discharge. Left eye exhibits no discharge.   Neck: Normal range of motion.   Cardiovascular: Normal rate and regular rhythm.    Pulmonary/Chest: Effort normal and breath sounds normal. No respiratory distress.   Neurological: She  is alert and oriented to person, place, and time. She has normal reflexes. No cranial nerve deficit. Coordination normal.   Psychiatric: She has a normal mood and affect. Her behavior is normal. Judgment and thought content normal.     General Musculoskeletal Exam   Gait: normal       Right Knee Exam     Inspection   Erythema: absent  Scars: absent  Swelling: absent  Effusion: effusion  Deformity: deformity  Bruising: absent    Tenderness   The patient is experiencing no tenderness (anterior knee pain).         Range of Motion   Extension: 0   Flexion: 90     Tests   Meniscus   Pedro:  Medial - negative Lateral - negative  Ligament Examination Lachman: normal (-1 to 2mm) PCL-Posterior Drawer: normal (0 to 2mm)     MCL - Valgus: normal (0 to 2mm)  LCL - Varus: normalPivot Shift: normal (Equal)Reverse Pivot Shift: normal (Equal)Dial Test at 30 degrees: normal (< 5 degrees)Dial Test at 90 degrees: normal (< 5 degrees)  Posterior Sag Test: negative  Posterolateral Corner: unstable (>15 degrees difference)  Patella   Patellar Apprehension: negative  Passive Patellar Tilt: neutral  Patellar Tracking: normal  Patellar Glide (quadrants): Lateral - 1   Medial - 2  Q-Angle at 90 degrees: normal  Patellar Grind: negative  J-Sign: none    Other   Meniscal Cyst: absent  Popliteal (Baker's) Cyst: absent  Sensation: normal    Comments:  Severe genu valgum     Left Knee Exam     Inspection   Erythema: absent  Scars: absent  Swelling: absent  Effusion: absent  Deformity: deformity  Bruising: absent    Tenderness   The patient is experiencing no tenderness.         Range of Motion   Extension: 0   Left knee flexion: 105.     Tests   Meniscus   Pedro:  Medial - negative Lateral - negative  Stability Lachman: normal (-1 to 2mm) PCL-Posterior Drawer: normal (0 to 2mm)  MCL - Valgus: normal (0 to 2mm)  LCL - Varus: normal (0 to 2mm)Pivot Shift: normal (Equal)Reverse Pivot Shift: normal (Equal)Dial Test at 30 degrees: normal (< 5  degrees)Dial Test at 90 degrees: normal (< 5 degrees)  Posterior Sag Test: negative  Posterolateral Corner: unstable (>15 degrees difference)  Patella   Patellar Apprehension: negative  Passive Patellar Tilt: neutral  Patellar Tracking: normal  Patellar Glide (Quadrants): Lateral - 1 Medial - 2  Q-Angle at 90 degrees: normal  Patellar Grind: positive  J-Sign: J sign absent    Other   Meniscal Cyst: absent  Popliteal (Baker's) Cyst: absent  Sensation: normal    Right Hip Exam     Tests   Venkata: negative  Left Hip Exam     Tests   Venkata: negative          Muscle Strength   Right Lower Extremity   Quadriceps:  4/5   Hamstring:  3/5   Left Lower Extremity   Quadriceps:  4/5   Hamstring:  3/5     Reflexes     Left Side  Quadriceps:  2+  Achilles:  2+    Right Side   Quadriceps:  2+  Achilles:  2+    Vascular Exam     Right Pulses  Dorsalis Pedis:      2+  Posterior Tibial:      2+        Left Pulses  Dorsalis Pedis:      2+  Posterior Tibial:      2+        Edema  Right Lower Leg: absent  Left Lower Leg: absent  RADIOGRAPHS:          Assessment:       Encounter Diagnoses   Name Primary?    Knee pain, unspecified chronicity, unspecified laterality Yes    Primary localized osteoarthrosis, lower leg, right           Plan:         1. IKDC, SF-12 and KOOS was not filled out today in clinic.     RTC in 1 weeks with Dr. Grace Irvin Patient will not fill out IKDC, SF-12 and KOOS on return.    2. Continue home PT    3. Armond will deliver CPM later this week - 0-45 degrees on CPM    4. Continue with 25-50% WB with walker    5. [id number] - Paresh Miranda and Zulma Cullen, performed a custom orthotic / brace adjustment, fitting and training with the patient. The patient demonstrated understanding and proper care. This was performed for 15 minutes. t-scope brace    6. TEGAN stockings provided for the patient today                                          Patient questionnaires may have been collected.

## 2017-08-02 NOTE — PATIENT INSTRUCTIONS
ROM: 0-45 degrees, can unlock brace at rest  WB: 25-50% with walker and brace locked in extension  Shower with soap and water as normal with prineo dressing in place   CPM: 0-45 degrees 6-8 hours a day  SLR in brace, ankle pumps, quad sets

## 2017-08-08 ENCOUNTER — PATIENT OUTREACH (OUTPATIENT)
Dept: OTHER | Facility: OTHER | Age: 47
End: 2017-08-08

## 2017-08-08 NOTE — PROGRESS NOTES
"Last 5 Patient Entered [facility] Current 30 Day Average: 127/84     Recent Readings 8/2/2017 7/28/2017 7/26/2017 7/24/2017 7/22/2017    Systolic BP (mmHg) 126 125 130 125 137    Diastolic BP (mmHg) 76 94 91 94 78    Pulse 85 64 69 59 60        Follow up with Mrs. Camila Black completed. Mrs. Blakc states that her recovery is "slow going" following knee replacement surgery on 7/28. She is unable to get around much on her own. Patient reports that her  has been doing most of the cooking at home and he is "very low sodium". Patient did not have any further questions or concerns. I will follow up in a few weeks to see how she is doing and progressing.          "

## 2017-08-09 ENCOUNTER — OFFICE VISIT (OUTPATIENT)
Dept: SPORTS MEDICINE | Facility: CLINIC | Age: 47
End: 2017-08-09
Payer: COMMERCIAL

## 2017-08-09 VITALS — WEIGHT: 290 LBS | HEIGHT: 66 IN | BODY MASS INDEX: 46.61 KG/M2

## 2017-08-09 DIAGNOSIS — Z96.651 S/P TOTAL KNEE REPLACEMENT, RIGHT: Primary | ICD-10-CM

## 2017-08-09 PROCEDURE — 99999 PR PBB SHADOW E&M-EST. PATIENT-LVL III: CPT | Mod: PBBFAC,,, | Performed by: PHYSICIAN ASSISTANT

## 2017-08-09 PROCEDURE — 99024 POSTOP FOLLOW-UP VISIT: CPT | Mod: S$GLB,,, | Performed by: PHYSICIAN ASSISTANT

## 2017-08-09 NOTE — PROGRESS NOTES
Subjective:          Chief Complaint: Camila Black is a 47 y.o. female who had concerns including Pain of the Right Knee.    Patient is here for a follow up for her right knee. Pain today is 0/10. Denies nausea, vomiting, fever, chills. She has been having home health 3x a week.  Occasional takes Percocet 10mg at night.  She will be starting outpatient [facility] next week. She is currently 25-50% weight bearing to right knee with T-scope in place.    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  Replacement, Knee, Medial and Lateral compartment (Total Knee) 11733, Complex  2.  Medial Collateral Ligament Reconstruction, (Extra-articular), 07020  Complexity of the case was increased due to the patient's BMI / body morphology, chronic nature of the problem and the degree of preoperative genu valgum noted.         Pain   Associated symptoms include joint swelling. Pertinent negatives include no abdominal pain, chest pain, chills, congestion, coughing, fever, headaches, myalgias, nausea, numbness, rash, sore throat or vomiting.      Patient presents to clinic for f/u following right knee Synvisc One injection 6 months ago on 8/11/16 by Dr. Irvin. She reports significant relief of pain with the visco supplementation. She has been taking [id number] twice a day and ibuprofen prn for pain relief. Pain today is 5/10. Pain is at its worst at 8/10 when it rains. Pain is worse with walking. Loud clicking while walking is concerning for pt. She is unable to run if she wants to and is no longer  able to participate in [employer]. She s not wearing her lateral  brace today.    Review of Systems   Constitution: Negative. Negative for chills, fever, weight gain and weight loss.   HENT: Negative for congestion, headaches and sore throat.    Eyes: Negative for blurred vision and double vision.   Cardiovascular: Negative for chest pain, leg swelling and palpitations.   Respiratory: Negative for cough and shortness of breath.    Skin: Negative for itching, poor wound healing and rash.   Musculoskeletal: Positive for joint pain, joint swelling, muscle weakness and stiffness. Negative for back pain and myalgias.   Gastrointestinal: Negative for abdominal pain, constipation, diarrhea, nausea and vomiting.   Genitourinary: Negative.  Negative for frequency and hematuria.   Neurological: Negative for dizziness, numbness, paresthesias and sensory change.   Psychiatric/Behavioral: Negative for altered mental status and depression. The patient is not nervous/anxious.    Allergic/Immunologic: Negative for hives.       Pain Related Questions  Over the past 3 days, what was your average pain during activity? (I.e. running, jogging, walking, climbing stairs, getting dressed, ect.): 0  Over the past 3 days, what was your highest pain level?: 0  Over the past 3 days, what was your lowest pain level? : 0    Other  How many nights a week are you awakened by your affected body part?: 0  Was the patient's HEIGHT measured or patient reported?: Patient Reported  Was the patient's WEIGHT measured or patient reported?: Patient Reported      Objective:        General: Camila is well-developed, well-nourished, appears stated age, in no acute distress, alert and oriented to time, place and person.     General    Vitals reviewed.  Constitutional: She is oriented to person, place, and time. She appears well-developed and well-nourished. No distress.   HENT:   Head: Normocephalic and atraumatic.   Mouth/Throat: No oropharyngeal exudate.    Eyes: EOM are normal. Right eye exhibits no discharge. Left eye exhibits no discharge.   Neck: Normal range of motion.   Cardiovascular: Normal rate and regular rhythm.    Pulmonary/Chest: Effort normal and breath sounds normal. No respiratory distress.   Neurological: She is alert and oriented to person, place, and time. She has normal reflexes. No cranial nerve deficit. Coordination normal.   Psychiatric: She has a normal mood and affect. Her behavior is normal. Judgment and thought content normal.     General Musculoskeletal Exam   Gait: normal       Right Knee Exam     Inspection   Erythema: absent  Scars: absent  Swelling: absent  Effusion: effusion  Deformity: deformity  Bruising: absent    Tenderness   The patient is experiencing no tenderness (anterior knee pain).         Range of Motion   Extension: 0   Flexion: 90     Tests   Meniscus   Pedro:  Medial - negative Lateral - negative  Ligament Examination Lachman: normal (-1 to 2mm) PCL-Posterior Drawer: normal (0 to 2mm)     MCL - Valgus: normal (0 to 2mm)  LCL - Varus: normalPivot Shift: normal (Equal)Reverse Pivot Shift: normal (Equal)Dial Test at 30 degrees: normal (< 5 degrees)Dial Test at 90 degrees: normal (< 5 degrees)  Posterior Sag Test: negative  Posterolateral Corner: unstable (>15 degrees difference)  Patella   Patellar Apprehension: negative  Passive Patellar Tilt: neutral  Patellar Tracking: normal  Patellar Glide (quadrants): Lateral - 1   Medial - 2  Q-Angle at 90 degrees: normal  Patellar Grind: negative  J-Sign: none    Other   Meniscal Cyst: absent  Popliteal (Baker's) Cyst: absent  Sensation: normal    Comments:  Severe genu valgum     Left Knee Exam     Inspection   Erythema: absent  Scars: absent  Swelling: absent  Effusion: absent  Deformity: deformity  Bruising: absent    Tenderness   The patient is experiencing no tenderness.         Range of Motion   Extension: 0   Left knee flexion: 105.     Tests   Meniscus   Pedro:   Medial - negative Lateral - negative  Stability Lachman: normal (-1 to 2mm) PCL-Posterior Drawer: normal (0 to 2mm)  MCL - Valgus: normal (0 to 2mm)  LCL - Varus: normal (0 to 2mm)Pivot Shift: normal (Equal)Reverse Pivot Shift: normal (Equal)Dial Test at 30 degrees: normal (< 5 degrees)Dial Test at 90 degrees: normal (< 5 degrees)  Posterior Sag Test: negative  Posterolateral Corner: unstable (>15 degrees difference)  Patella   Patellar Apprehension: negative  Passive Patellar Tilt: neutral  Patellar Tracking: normal  Patellar Glide (Quadrants): Lateral - 1 Medial - 2  Q-Angle at 90 degrees: normal  Patellar Grind: positive  J-Sign: J sign absent    Other   Meniscal Cyst: absent  Popliteal (Baker's) Cyst: absent  Sensation: normal    Right Hip Exam     Tests   Venkata: negative  Left Hip Exam     Tests   Venkata: negative          Muscle Strength   Right Lower Extremity   Quadriceps:  4/5   Hamstring:  3/5   Left Lower Extremity   Quadriceps:  4/5   Hamstring:  3/5     Reflexes     Left Side  Quadriceps:  2+  Achilles:  2+    Right Side   Quadriceps:  2+  Achilles:  2+    Vascular Exam     Right Pulses  Dorsalis Pedis:      2+  Posterior Tibial:      2+        Left Pulses  Dorsalis Pedis:      2+  Posterior Tibial:      2+        Edema  Right Lower Leg: absent  Left Lower Leg: absent  RADIOGRAPHS:  Comparison: 7/28/17.    Findings: AP and lateral radiographs of the right knee demonstrate total knee arthroplasty with all components well seated.  There still foci of gas in the anterior soft tissues.  The soft tissues are edematous.  There is no fracture or dislocation.  This been interval development of osteopenia particularly in the proximal tibia and fibula.        Assessment:       Encounter Diagnosis   Name Primary?    S/P total knee replacement, right Yes          Plan:         1. IKDC, SF-12 and KOOS was not filled out today in clinic.     RTC in 2 weeks with Cecille Gillis PA-C. Patient will not fill out IKDC, SF-12  and KOOS on return.    2. Start outpatient PT next week    3.Continue CPM -10- 45 ROM    4. Continue with 25-50% WB with walker    5. Continue T-scope brace    6. Continue TEGAN stockings                                          Patient questionnaires may have been collected.

## 2017-08-10 ENCOUNTER — PATIENT MESSAGE (OUTPATIENT)
Dept: SPORTS MEDICINE | Facility: CLINIC | Age: 47
End: 2017-08-10

## 2017-08-10 DIAGNOSIS — M25.561 ACUTE PAIN OF RIGHT KNEE: Primary | ICD-10-CM

## 2017-08-10 RX ORDER — CELECOXIB 200 MG/1
200 CAPSULE ORAL 2 TIMES DAILY
Qty: 60 CAPSULE | Refills: 2 | Status: SHIPPED | OUTPATIENT
Start: 2017-08-10 | End: 2017-11-20 | Stop reason: SDUPTHER

## 2017-08-15 ENCOUNTER — PATIENT MESSAGE (OUTPATIENT)
Dept: SPORTS MEDICINE | Facility: CLINIC | Age: 47
End: 2017-08-15

## 2017-08-16 DIAGNOSIS — M25.562 KNEE PAIN, LEFT: Primary | ICD-10-CM

## 2017-08-17 ENCOUNTER — PATIENT OUTREACH (OUTPATIENT)
Dept: OTHER | Facility: OTHER | Age: 47
End: 2017-08-17

## 2017-08-17 ENCOUNTER — CLINICAL SUPPORT (OUTPATIENT)
Dept: REHABILITATION | Facility: HOSPITAL | Age: 47
End: 2017-08-17
Attending: ORTHOPAEDIC SURGERY
Payer: COMMERCIAL

## 2017-08-17 DIAGNOSIS — Z96.651 STATUS POST TOTAL RIGHT KNEE REPLACEMENT: ICD-10-CM

## 2017-08-17 DIAGNOSIS — M25.561 ACUTE PAIN OF RIGHT KNEE: ICD-10-CM

## 2017-08-17 PROCEDURE — 97110 THERAPEUTIC EXERCISES: CPT

## 2017-08-17 PROCEDURE — 97162 PT EVAL MOD COMPLEX 30 MIN: CPT

## 2017-08-17 NOTE — PROGRESS NOTES
Last 5 Patient Entered [facility] Current 30 Day Average: 119/82     Recent Readings 8/16/2017 8/15/2017 8/15/2017 8/15/2017 8/15/2017    Systolic BP (mmHg) 93 96 88 85 85    Diastolic BP (mmHg) 67 59 63 55 52    Pulse 71 85 76 68 69        Hypertension Medications             losartan (COZAAR) 50 MG tablet Take 1 tablet (50 mg total) by mouth once daily.        Plan:   Called patient regarding hypotensive readings. Per current 30 day average, BP is well controlled.   LVM, requested patient call back.   Will continue to monitor. WCB in 1 week.

## 2017-08-17 NOTE — PROGRESS NOTES
"[facility]   PATIENT EVALUATION    Date: 08/17/2017    Visit #: 1     Start Time:  1100  Stop Time:  1145    Evaluation Date: 8/17/2017      History     HPI: Patient reports R knee issues for years, but last year she noticed her was forming genu valgus with gait. Her pain was tolerable, but once it started clicking she felt like her knee was "giving" and that symptom led to her sx. Patient reports Hx of OA.   Onset Date: last year  Date of Surgery:7/28/17  Primary Diagnosis:   1. Acute pain of right knee     2. Status post total right knee replacement       Treatment Diagnosis: s/p R TKA with MCL  Past Medical History:   Diagnosis Date    Arthritis 2012    OA    B12 deficiency 5/1/2017    Basal cell carcinoma 2/2010    right scalp    Cancer 2010    SKIN CANCER TO SCALP    Hypertension     Morbid obesity with BMI of 40.0-44.9, adult 7/25/2014    S/P gastric bypass 7/13/2015     Precautions: standard, TKA protocol with MCL reconstruction (0-45 deg ROM for 4 weeks)  PHYSICAL THERAPY:  The patient should begin physical therapy on postoperative   day # 14 and will be advanced to outpatient therapy as soon as   Possible following discharge.     Weight bearing:partial weight bearing 25-50 % right leg  Range of Motion:no motion for 2-4 weeks     Continuous passive motion (CPM) machine start on   postop day #14 at  10 degrees hyperextension to 40 degrees flexion as tolerated for 6-8 hours per day for 4 weeks.     The patient is to be taken out of the continuous passive motion (CPM)  machine as much as possible and avoid active assisted range of motion programs.     Immediate specific exercises should include:extension exercises, quadriceps load with a heel roll, prone hang procedures with 5-10 lbs. ankle weights.     Gait program should include: protected gait following symptoms of pain and swelling    Prior Therapy: home health  Diagnostic Tests: X-ray  Prior Level of Function: " Independent  Sports/Recreational Activities: none  Extremity Dominance: right  Functional Deficits Leading to Referral/Nature of Injury: chronic nature of condition  Patient Therapy Goals: walk normal    Subjective     Camila Black states her knee is feeling good today.    Pain:  Location: knee   Description: Tight  Activities Which Increase Pain: Getting out of bed/chair  Activities Which Decrease Pain: elevation  Pain Scale: 0/10 at best 0/10 now  2/10 at worst    Objective     Posture: increased hip flex, slight knee flex  Palpation: NA  Sensation: WNL  DTRs: NT  Range of Motion/Strength:       Range of motion    Knee:        Left Right   Extension  +2 -2   Flexion 105 45                         Strength:    Knee:        Left Right   Extension  5/5 NT   Flexion 5/5 NT                                 Gait: With AD.  Device Used -  Rolling walker  Analysis: step through gait (50% WB)      Treatment:   Quad sets 10 sec x10  Calf stretch 3x 30 sec  SLR x10  LLR x10    Treatment ended with ice x10 min            History  Co-morbidities and personal factors that may impact the plan of care Moderate    Evolving Clinical Presentation   Co-morbidities:       Personal Factors:     Body regions    Body systems    Activity Limitations    Participation Restrictons     1- 2  Personal factors/ combordities:   Arthritis, obesity      Address  3 + elements:     ROM impaired  MMT impaired  Gait impaired  Decreased FOTO score       PT reviewed FOTO scores for Camila Black on 8/17/2017    FOTO score: 31    Functional Limitations Reports - G Codes  Category: mobility  Tool: FOTO      Current ():  CL  Goal (): CJ  Discharge ():  NA                Assessment   This is a 47 y.o. female referred to outpatient physical therapy and presents with a medical diagnosis of s/p TKA with MCL reconstruction and demonstrates limitations as described in the problem list. Pt demonstrates good rehab potential. Pt  will benefit from physcial therapy services in order to maximize pain free and/or functional use of right knee. The following goals were discussed with the patient and patient is in agreement with them as to be addressed in the treatment plan. Pt was given a HEP consisting of treatment this visit. Pt verbally understood the instructions as they were given and demonstrated proper form and technique during therapy. Pt was advised to perform these exercises free of pain, and to stop performing them if pain occurs.         Initial Assessment (Pertinent finding, problem list and factors affecting outcome):   Medical necessity is demonstrated by the following problem list:   - Pain limits function of effected part for all activities  - Unable to participate in daily activities   - Requires skilled supervision to complete and progress HEP  - Fall risk - impaired balance   - Continued inability to participate in vocational pursuits      Rehab Potiential: good    Short Term Goals (4 Weeks):   - Pt will increase ROM to 90 deg R knee flex and 0 deg ext  - Pt will increase strength to 4/5 R knee  - Decrease Pain to 2/10 as worst  - Pt to self correct posture and gait with minimal cues  - Pt independent with HEP with progressions.     Long Term Goals (12 Weeks):   - Pt will increase ROM to WNL R knee  - Pt will increase strength to 5/5 R knee and hip   - Decrease Pain to 0/10  - Pt to return to 80% PLOF      Plan     Pt will be seen 1-2 times per week for 12 weeks. Recommended Treatment Plan will include:   AROM/PROM exercise  Manual soft tissue and/or joint mobilization  Therapeutic Exercise   Individualized Home Exercise Program  Modalities:cryotherapy   Pt education:on HEP    Therapist: Rosmery Spencer, PT, DPT  I CERTIFY THE NEED FOR THESE SERVICES FURNISHED UNDER THIS PLAN OF TREATMENT AND WHILE UNDER MY CARE    Physician's comments:  ________________________________________________________________________________________________________________________________________________    Physician's Name: ___________________________________

## 2017-08-21 ENCOUNTER — HOSPITAL ENCOUNTER (OUTPATIENT)
Dept: RADIOLOGY | Facility: HOSPITAL | Age: 47
Discharge: HOME | End: 2017-08-21
Attending: ORTHOPAEDIC SURGERY
Payer: COMMERCIAL

## 2017-08-21 ENCOUNTER — OFFICE VISIT (OUTPATIENT)
Dept: SPORTS MEDICINE | Facility: CLINIC | Age: 47
End: 2017-08-21
Payer: COMMERCIAL

## 2017-08-21 VITALS
HEART RATE: 73 BPM | WEIGHT: 290 LBS | DIASTOLIC BLOOD PRESSURE: 80 MMHG | BODY MASS INDEX: 46.61 KG/M2 | HEIGHT: 66 IN | SYSTOLIC BLOOD PRESSURE: 124 MMHG

## 2017-08-21 DIAGNOSIS — M25.561 RIGHT KNEE PAIN, UNSPECIFIED CHRONICITY: Primary | ICD-10-CM

## 2017-08-21 DIAGNOSIS — M25.561 RIGHT KNEE PAIN, UNSPECIFIED CHRONICITY: ICD-10-CM

## 2017-08-21 DIAGNOSIS — M17.11 PRIMARY LOCALIZED OSTEOARTHROSIS, LOWER LEG, RIGHT: Chronic | ICD-10-CM

## 2017-08-21 PROCEDURE — 99024 POSTOP FOLLOW-UP VISIT: CPT | Mod: S$GLB,,, | Performed by: ORTHOPAEDIC SURGERY

## 2017-08-21 PROCEDURE — 73560 X-RAY EXAM OF KNEE 1 OR 2: CPT | Mod: TC,PO,RT

## 2017-08-21 PROCEDURE — 73560 X-RAY EXAM OF KNEE 1 OR 2: CPT | Mod: 26,RT,, | Performed by: RADIOLOGY

## 2017-08-21 PROCEDURE — 99999 PR PBB SHADOW E&M-EST. PATIENT-LVL IV: CPT | Mod: PBBFAC,,, | Performed by: ORTHOPAEDIC SURGERY

## 2017-08-21 NOTE — PROGRESS NOTES
Subjective:          Chief Complaint: Camila Black is a 47 y.o. female who had concerns including Post-op Evaluation of the Right Knee.    Patient is here for a follow up for her right knee.     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  Replacement, Knee, Medial and Lateral compartment (Total Knee) 72072, Complex  2.  Medial Collateral Ligament Reconstruction, (Extra-articular), 18772  Complexity of the case was increased due to the patient's BMI / body morphology, chronic nature of the problem and the degree of preoperative genu valgum noted.         Pain   Associated symptoms include joint swelling. Pertinent negatives include no abdominal pain, chest pain, chills, congestion, coughing, fever, headaches, myalgias, nausea, numbness, rash, sore throat or vomiting.          Review of Systems   Constitution: Negative. Negative for chills, fever, weight gain and weight loss.   HENT: Negative for congestion, headaches and sore throat.    Eyes: Negative for blurred vision and double vision.   Cardiovascular: Negative for chest pain, leg swelling and palpitations.   Respiratory: Negative for cough and shortness of breath.    Skin: Negative for itching, poor wound healing and rash.   Musculoskeletal: Positive for joint pain, joint swelling, muscle weakness and stiffness. Negative for back pain and myalgias.   Gastrointestinal: Negative for abdominal pain, constipation, diarrhea, nausea and vomiting.   Genitourinary: Negative.  Negative for frequency and hematuria.   Neurological: Negative  for dizziness, numbness, paresthesias and sensory change.   Psychiatric/Behavioral: Negative for altered mental status and depression. The patient is not nervous/anxious.    Allergic/Immunologic: Negative for hives.       Pain Related Questions  Over the past 3 days, what was your average pain during activity? (I.e. running, jogging, walking, climbing stairs, getting dressed, ect.): 0  Over the past 3 days, what was your highest pain level?: 0  Over the past 3 days, what was your lowest pain level? : 0    Other  How many nights a week are you awakened by your affected body part?: 0  Was the patient's HEIGHT measured or patient reported?: Patient Reported  Was the patient's WEIGHT measured or patient reported?: Measured      Objective:        General: Camila is well-developed, well-nourished, appears stated age, in no acute distress, alert and oriented to time, place and person.     General    Vitals reviewed.  Constitutional: She is oriented to person, place, and time. She appears well-developed and well-nourished. No distress.   HENT:   Head: Normocephalic and atraumatic.   Mouth/Throat: No oropharyngeal exudate.   Eyes: EOM are normal. Right eye exhibits no discharge. Left eye exhibits no discharge.   Neck: Normal range of motion.   Cardiovascular: Normal rate and regular rhythm.    Pulmonary/Chest: Effort normal and breath sounds normal. No respiratory distress.   Neurological: She is alert and oriented to person, place, and time. She has normal reflexes. No cranial nerve deficit. Coordination normal.   Psychiatric: She has a normal mood and affect. Her behavior is normal. Judgment and thought content normal.     General Musculoskeletal Exam   Gait: antalgic       Right Knee Exam     Inspection   Erythema: absent  Scars: absent  Swelling: absent  Effusion: effusion  Deformity: deformity  Bruising: absent    Tenderness   The patient is experiencing no tenderness (anterior knee pain).         Range of Motion    Extension: 0   Flexion: 90     Tests   Meniscus   Pedro:  Medial - negative Lateral - negative  Ligament Examination Lachman: normal (-1 to 2mm) PCL-Posterior Drawer: normal (0 to 2mm)     MCL - Valgus: normal (0 to 2mm)  LCL - Varus: normalPivot Shift: normal (Equal)Reverse Pivot Shift: normal (Equal)Dial Test at 30 degrees: normal (< 5 degrees)Dial Test at 90 degrees: normal (< 5 degrees)  Posterior Sag Test: negative  Posterolateral Corner: unstable (>15 degrees difference)  Patella   Patellar Apprehension: negative  Passive Patellar Tilt: neutral  Patellar Tracking: normal  Patellar Glide (quadrants): Lateral - 1   Medial - 2  Q-Angle at 90 degrees: normal  Patellar Grind: negative  J-Sign: none    Other   Meniscal Cyst: absent  Popliteal (Baker's) Cyst: absent  Sensation: normal    Comments:  Severe genu valgum     Left Knee Exam     Inspection   Erythema: absent  Scars: present  Swelling: absent  Effusion: absent  Deformity: deformity  Bruising: absent    Tenderness   The patient is experiencing no tenderness.         Range of Motion   Extension: 0   Left knee flexion: 105.     Tests   Meniscus   Pedro:  Medial - negative Lateral - negative  Stability Lachman: normal (-1 to 2mm) PCL-Posterior Drawer: normal (0 to 2mm)  MCL - Valgus: normal (0 to 2mm)  LCL - Varus: normal (0 to 2mm)Pivot Shift: normal (Equal)Reverse Pivot Shift: normal (Equal)Dial Test at 30 degrees: normal (< 5 degrees)Dial Test at 90 degrees: normal (< 5 degrees)  Posterior Sag Test: negative  Posterolateral Corner: unstable (>15 degrees difference)  Patella   Patellar Apprehension: negative  Passive Patellar Tilt: neutral  Patellar Tracking: normal  Patellar Glide (Quadrants): Lateral - 1 Medial - 2  Q-Angle at 90 degrees: normal  Patellar Grind: positive  J-Sign: J sign absent    Other   Meniscal Cyst: absent  Popliteal (Baker's) Cyst: absent  Sensation: normal    Right Hip Exam     Tests   Venkata: negative  Left Hip Exam     Tests    Venkata: negative          Muscle Strength   Right Lower Extremity   Quadriceps:  4/5   Hamstring:  3/5   Left Lower Extremity   Hip Abduction: 4/5   Quadriceps:  4/5   Hamstring:  3/5     Reflexes     Left Side  Quadriceps:  2+  Achilles:  2+    Right Side   Quadriceps:  2+  Achilles:  2+    Vascular Exam     Right Pulses  Dorsalis Pedis:      2+  Posterior Tibial:      2+        Left Pulses  Dorsalis Pedis:      2+  Posterior Tibial:      2+        Edema  Right Lower Leg: absent  Left Lower Leg: absent        Assessment:       Encounter Diagnoses   Name Primary?    Right knee pain, unspecified chronicity Yes    Primary localized osteoarthrosis, lower leg, right           Plan:         1. IKDC, SF-12 and KOOS was not filled out today in clinic.     RTC in 3 weeks with Dr. Grace Irvin Patient will fill out IKDC, SF-12 and KOOS on return.    2. Weight bearing as tolerated right lower extremity, keep t-scope brace at all times, no restrictions on ROM, walker for balance    3. Continue pain meds as needed                                          Patient questionnaires may have been collected.

## 2017-08-22 ENCOUNTER — CLINICAL SUPPORT (OUTPATIENT)
Dept: REHABILITATION | Facility: HOSPITAL | Age: 47
End: 2017-08-22
Attending: ORTHOPAEDIC SURGERY
Payer: COMMERCIAL

## 2017-08-22 DIAGNOSIS — Z96.651 STATUS POST TOTAL RIGHT KNEE REPLACEMENT: ICD-10-CM

## 2017-08-22 DIAGNOSIS — M25.561 ACUTE PAIN OF RIGHT KNEE: ICD-10-CM

## 2017-08-22 PROCEDURE — 97110 THERAPEUTIC EXERCISES: CPT

## 2017-08-22 NOTE — PROGRESS NOTES
Physical Therapy Daily Note     Date: 08/22/2017  Name: Camila Black  Clinic Number: [id number]  Diagnosis:   Encounter Diagnoses   Name Primary?    Acute pain of right knee     Status post total right knee replacement      Physician: Grace Irvin MD    Evaluation Date: 8/17/17  Date of Surgery: 7/28/17  Visit #: 2   Start Time:  1100  Stop Time:  1155  Total Treatment Time: 55    Precautions: standard, s/p R TKA with MCL reconstruction      Subjective     Pt reports compliance with HEP.    Pain: 0/10    Objective     Measurements taken:   R knee:  Flex: 75 deg  Ext: 0 deg     Patient received group therapy to increase strength, endurance, ROM and flexibility with activities as follows:     Camila received therapeutic exercises to develop strength, endurance, ROM, flexibility and core stabilization for 45 minutes including:   Quad sets 5 sec x30  Ankle pumps and circles with heel prop x30 ea  Heel slides x30  Calf stretch 3x 30 sec  SLR x20  LLR x20  Hip ext x20  Prone hang 3# x3 min  Heel raises x20  Mini squats x20  HSS x1 min B  GSS x2 min     Treatment ended with ice x10 min      Written Home Exercises Provided: provided at initial eval  Pt demo good understanding of the education provided. Camila demonstrated good return demonstration of activities.     Education provided:  Camila verbalized good understanding of education provided.   No spiritual or educational barriers to learning identified.    Assessment     This is a 47 y.o. female referred to outpatient physical therapy and presents with a medical diagnosis of s/p R TKA with MCL reconstruction and demonstrates limitations as described in the problem list Pt prognosis is Good. Pt will continue to benefit from skilled outpatient physical therapy to address the deficits listed below in the problem list, provide pt/family education and to maximize pt's level of independence in the home and  community environment.    Will the patient continue to benefit from skilled PT intervention? yes        Medical necessity is demonstrated by:   - Pain limits function of effected part for all activities  - Unable to participate in daily activities     Progress towards goals: good    New/Revised Goals:none this visit       Plan   Continue with established Plan of Care towards PT goals.      Therapist: Rosmery Spencer, PT, DPT

## 2017-08-24 NOTE — PROGRESS NOTES
Last 5 Patient Entered Redings Current 30 Day Average: 111/78     Recent Readings 8/20/2017 8/16/2017 8/15/2017 8/15/2017 8/15/2017    Systolic BP (mmHg) 108 93 96 88 85    Diastolic BP (mmHg) 68 67 59 63 55    Pulse 84 71 85 76 68        Hypertension Medications             losartan (COZAAR) 50 MG tablet Take 1 tablet (50 mg total) by mouth once daily.        Plan:   Called patient regarding low readings. Per current 30 day average, BP is well controlled/ low. Instructed patient to take EOD readings for the next 2 weeks and to hold her losartan 50mg, patient confirms understanding. If BP trends without medication, will discuss restarting losartan at 25mg.   Patient denies having questions or concerns. Will continue to monitor. WCB in 2 weeks.

## 2017-08-25 ENCOUNTER — CLINICAL SUPPORT (OUTPATIENT)
Dept: REHABILITATION | Facility: HOSPITAL | Age: 47
End: 2017-08-25
Attending: ORTHOPAEDIC SURGERY
Payer: COMMERCIAL

## 2017-08-25 DIAGNOSIS — Z96.651 STATUS POST TOTAL RIGHT KNEE REPLACEMENT: ICD-10-CM

## 2017-08-25 DIAGNOSIS — M25.561 ACUTE PAIN OF RIGHT KNEE: ICD-10-CM

## 2017-08-25 PROCEDURE — 97110 THERAPEUTIC EXERCISES: CPT

## 2017-08-25 NOTE — PROGRESS NOTES
Physical Therapy Daily Note     Date: 08/25/2017  Name: Camila Black  Clinic Number: [id number]  Diagnosis:   Encounter Diagnoses   Name Primary?    Acute pain of right knee     Status post total right knee replacement      Physician: Grace Irvin MD    Evaluation Date: 8/17/17  Date of Surgery: 7/28/17  Visit #: 3  Start Time:  1100  Stop Time:  1155  Total Treatment Time: 55    Precautions: standard, s/p R TKA with MCL reconstruction      Subjective     Pt reports feeling like she is walking more normally with a SPC.    Pain: 0/10    Objective     Measurements taken:   R knee:  Flex: 75 deg  Ext: 0 deg     Patient received group therapy to increase strength, endurance, ROM and flexibility with activities as follows:     Camila received therapeutic exercises to develop strength, endurance, ROM, flexibility and core stabilization for 45 minutes including:   Quad sets 5 sec x30  Heel slides x30  SLR x30  LLR x30  Hip ext x30  Prone hang 3# x3 min  Heel raises x30  Mini squats x30  LAQ with ball squeeze x30  AAROM knee flex stretch off table 2x 1 min hold  HSS x1 min B  GSS x2 min     Treatment ended with ice x10 min      Written Home Exercises Provided: provided at initial eval  Pt demo good understanding of the education provided. Camila demonstrated good return demonstration of activities.     Education provided:  Camila verbalized good understanding of education provided.   No spiritual or educational barriers to learning identified.    Assessment   Patient demonstrated improved endurance with leg raises this visit and is able to tolerate quad set better. Patient also showing better mini squatting requiring less cuing. Patient would continue to benefit from PT intervention to progress toward functional goals.  This is a 47 y.o. female referred to outpatient physical therapy and presents with a medical diagnosis of s/p R TKA with MCL  reconstruction and demonstrates limitations as described in the problem list Pt prognosis is Good. Pt will continue to benefit from skilled outpatient physical therapy to address the deficits listed below in the problem list, provide pt/family education and to maximize pt's level of independence in the home and community environment.    Will the patient continue to benefit from skilled PT intervention? yes        Medical necessity is demonstrated by:   - Pain limits function of effected part for all activities  - Unable to participate in daily activities     Progress towards goals: good    New/Revised Goals:none this visit       Plan   Continue with established Plan of Care towards PT goals.      Therapist: Rosmery Spencer, PT, DPT

## 2017-08-27 DIAGNOSIS — N39.41 URGE INCONTINENCE: ICD-10-CM

## 2017-08-27 DIAGNOSIS — R39.15 URINARY URGENCY: ICD-10-CM

## 2017-08-28 RX ORDER — OXYBUTYNIN CHLORIDE 15 MG/1
15 TABLET, EXTENDED RELEASE ORAL DAILY
Qty: 30 TABLET | Refills: 0 | Status: SHIPPED | OUTPATIENT
Start: 2017-08-28 | End: 2017-09-20 | Stop reason: SDUPTHER

## 2017-09-01 ENCOUNTER — CLINICAL SUPPORT (OUTPATIENT)
Dept: REHABILITATION | Facility: HOSPITAL | Age: 47
End: 2017-09-01
Attending: ORTHOPAEDIC SURGERY
Payer: COMMERCIAL

## 2017-09-01 DIAGNOSIS — Z96.651 STATUS POST TOTAL RIGHT KNEE REPLACEMENT: ICD-10-CM

## 2017-09-01 DIAGNOSIS — M25.561 ACUTE PAIN OF RIGHT KNEE: ICD-10-CM

## 2017-09-01 PROCEDURE — 97110 THERAPEUTIC EXERCISES: CPT

## 2017-09-01 NOTE — PROGRESS NOTES
Physical Therapy Daily Note     Date: 09/01/2017  Name: Camila Black  Clinic Number: [id number]  Diagnosis:   Encounter Diagnoses   Name Primary?    Acute pain of right knee     Status post total right knee replacement      Physician: Grace Irvin MD    Evaluation Date: 8/17/17  Date of Surgery: 7/28/17  Visit #: 4  Start Time:  1045  Stop Time:  1145  Total Treatment Time: 60    Precautions: standard, s/p R TKA with MCL reconstruction      Subjective     Pt reports feeling she can tell her quad strength is improving.  Pain: 0/10    Objective     Measurements taken: (9/1/17)  R knee:  Flex: 79 deg (prone)  Ext: 0 deg     Patient received group therapy to increase strength, endurance, ROM and flexibility with activities as follows:     Camila received therapeutic exercises to develop strength, endurance, ROM, flexibility and core stabilization for 45 minutes including:   Quad sets 5 sec x30 on bolster  Heel slides x30  3 way hip 1# x30  Prone hang 4# x3 min  Heel raises x30  Mini squats x30  LAQ with ball squeeze x30 1#  AAROM knee flex stretch off table 2x 1 min hold  Prone with strap 10 sec x10 knee flex  Gait training with cones x3 laps  HSS x1 min B  GSS x2 min     Treatment ended with ice x10 min      Written Home Exercises Provided: provided at initial eval  Pt demo good understanding of the education provided. Camila demonstrated good return demonstration of activities.     Education provided:  Camila verbalized good understanding of education provided.   No spiritual or educational barriers to learning identified.    Assessment   Patient showed slight improvement with knee ROM this visit. She is demonstrating a more natural gait pattern and made less compensations with repetitions during gait training. Patient would continue to benefit from PT intervention to progress toward functional goals.  This is a 47 y.o. female referred to outpatient  physical therapy and presents with a medical diagnosis of s/p R TKA with MCL reconstruction and demonstrates limitations as described in the problem list Pt prognosis is Good. Pt will continue to benefit from skilled outpatient physical therapy to address the deficits listed below in the problem list, provide pt/family education and to maximize pt's level of independence in the home and community environment.    Will the patient continue to benefit from skilled PT intervention? yes        Medical necessity is demonstrated by:   - Pain limits function of effected part for all activities  - Unable to participate in daily activities     Progress towards goals: good    New/Revised Goals:none this visit       Plan   Continue with established Plan of Care towards PT goals.      Therapist: Rosmery Spencer, PT, DPT

## 2017-09-05 ENCOUNTER — TELEPHONE (OUTPATIENT)
Dept: SPORTS MEDICINE | Facility: CLINIC | Age: 47
End: 2017-09-05

## 2017-09-05 ENCOUNTER — PATIENT OUTREACH (OUTPATIENT)
Dept: OTHER | Facility: OTHER | Age: 47
End: 2017-09-05

## 2017-09-05 NOTE — TELEPHONE ENCOUNTER
Called patient left message on voicemail that Cecille want be in the office on 9-8-17 and wanted to reschedule for  9-11-17 at 2pm.

## 2017-09-05 NOTE — PROGRESS NOTES
Last 5 Patient Entered Redings Current 30 Day Average: 102/69     Recent Readings 9/2/2017 8/30/2017 8/27/2017 8/24/2017 8/20/2017    Systolic BP (mmHg) 103 92 106 115 108    Diastolic BP (mmHg) 64 49 77 81 68    Pulse 77 80 80 71 84        Hypertension Digital Medicine Program (HDMP): Health  Follow Up    Lifestyle Modifications:    1. Low sodium diet: yes Patient reports that she is maintaining her low sodium diet.     2.Physical activity: no Ms. Black is still recovering from knee replacement surgery. She is attending therapy two days per week.    3.Hypotension/Hypertension symptoms: no Patient denies hypotensive symptoms.  Frequency/Alleviating factors/Precipitating factors, etc.     4. Patient has been compliant with the medicaiton regimen    Follow up with Mr. Camila Black completed. No further questions or concerns. I will follow up in a few weeks to assess progress.

## 2017-09-07 ENCOUNTER — PATIENT OUTREACH (OUTPATIENT)
Dept: OTHER | Facility: OTHER | Age: 47
End: 2017-09-07

## 2017-09-07 NOTE — PROGRESS NOTES
Last 5 Patient Entered Redings Current 30 Day Average: 102/70     Recent Readings 9/6/2017 9/2/2017 8/30/2017 8/27/2017 8/24/2017    Systolic BP (mmHg) 103 103 92 106 115    Diastolic BP (mmHg) 72 64 49 77 81    Pulse 69 77 80 80 71        Hypertension Medications             losartan (COZAAR) 50 MG tablet Take 1 tablet (50 mg total) by mouth once daily. - on hold        Plan:   Called patient to follow up since discontinuing losartan. Per current 30 day average, BP has remained well controlled off of losartan. Patient denies having questions or concerns. Will continue to monitor. WCB in 4 months, sooner if BP begins to trend up or down.

## 2017-09-08 ENCOUNTER — CLINICAL SUPPORT (OUTPATIENT)
Dept: REHABILITATION | Facility: HOSPITAL | Age: 47
End: 2017-09-08
Attending: ORTHOPAEDIC SURGERY
Payer: COMMERCIAL

## 2017-09-08 DIAGNOSIS — Z96.651 STATUS POST TOTAL RIGHT KNEE REPLACEMENT: ICD-10-CM

## 2017-09-08 DIAGNOSIS — M25.561 ACUTE PAIN OF RIGHT KNEE: ICD-10-CM

## 2017-09-08 PROCEDURE — 97110 THERAPEUTIC EXERCISES: CPT

## 2017-09-08 NOTE — PROGRESS NOTES
Physical Therapy Daily Note     Date: 09/08/2017  Name: Camila Black  Clinic Number: [id number]  Diagnosis:   Encounter Diagnoses   Name Primary?    Acute pain of right knee     Status post total right knee replacement      Physician: Grace Irvin MD    Evaluation Date: 8/17/17  Date of Surgery: 7/28/17  Visit #: 5  Start Time:  1045  Stop Time:  1155  Total Treatment Time: 70    Precautions: standard, s/p R TKA with MCL reconstruction      Subjective     Pt reports she is walking better.  Pain: 0/10    Objective     Measurements taken: (9/8/17)  R knee:  Flex: 84 deg (prone)  Ext: 0 deg     Patient received group therapy to increase strength, endurance, ROM and flexibility with activities as follows:     Camila received therapeutic exercises to develop strength, endurance, ROM, flexibility and core stabilization for 60 minutes including:   Stat bike in available ROM x8 min  Quad sets 5 sec x30 on bolster  Heel slides x30-np  3 way hip 1# x30  Prone hang 4# x3 min  Leg press 100# dbl leg x30  Heel raise on leg press 100# x30  LAQ with ball squeeze x30 1#  AAROM knee flex stretch off table 2x 1 min hold-np  Prone with strap 10 sec x10 knee flex  Gait training with cones x3 laps-np  HSS x1 min B  GSS x2 min     Treatment ended with ice x10 min      Written Home Exercises Provided: provided at initial eval  Pt demo good understanding of the education provided. Camila demonstrated good return demonstration of activities.     Education provided:  Camila verbalized good understanding of education provided.   No spiritual or educational barriers to learning identified.    Assessment   Patient showing improved ROM in R knee since last visit. She is demonstrating good benefit from treatment. Patient would continue to benefit from PT intervention to progress toward functional goals.  This is a 47 y.o. female referred to outpatient physical therapy and  presents with a medical diagnosis of s/p R TKA with MCL reconstruction and demonstrates limitations as described in the problem list Pt prognosis is Good. Pt will continue to benefit from skilled outpatient physical therapy to address the deficits listed below in the problem list, provide pt/family education and to maximize pt's level of independence in the home and community environment.    Will the patient continue to benefit from skilled PT intervention? yes        Medical necessity is demonstrated by:   - Pain limits function of effected part for all activities  - Unable to participate in daily activities     Progress towards goals: good    New/Revised Goals:none this visit       Plan   Continue with established Plan of Care towards PT goals.      Therapist: Rosmery Spencer, PT, DPT

## 2017-09-11 ENCOUNTER — OFFICE VISIT (OUTPATIENT)
Dept: SPORTS MEDICINE | Facility: CLINIC | Age: 47
End: 2017-09-11
Payer: COMMERCIAL

## 2017-09-11 VITALS
HEIGHT: 66 IN | DIASTOLIC BLOOD PRESSURE: 88 MMHG | BODY MASS INDEX: 46.61 KG/M2 | HEART RATE: 67 BPM | WEIGHT: 290 LBS | SYSTOLIC BLOOD PRESSURE: 131 MMHG

## 2017-09-11 DIAGNOSIS — G89.29 CHRONIC PAIN OF RIGHT KNEE: Primary | ICD-10-CM

## 2017-09-11 DIAGNOSIS — M25.561 CHRONIC PAIN OF RIGHT KNEE: Primary | ICD-10-CM

## 2017-09-11 DIAGNOSIS — Z96.651 S/P TOTAL KNEE REPLACEMENT, RIGHT: ICD-10-CM

## 2017-09-11 PROCEDURE — 99999 PR PBB SHADOW E&M-EST. PATIENT-LVL III: CPT | Mod: PBBFAC,,, | Performed by: PHYSICIAN ASSISTANT

## 2017-09-11 PROCEDURE — 99024 POSTOP FOLLOW-UP VISIT: CPT | Mod: S$GLB,,, | Performed by: PHYSICIAN ASSISTANT

## 2017-09-11 NOTE — PROGRESS NOTES
Subjective:          Chief Complaint: Camila Black is a 47 y.o. female who had concerns including Pain of the Right Knee.    Patient is here for a follow up for her right knee. Pain is 0/10. She states that she has had no pain since surgery. Ambulating with a cane and a T-scope brace on WBAT. She has been going to PT 2x a week with Rosmery Spencer.     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  Replacement, Knee, Medial and Lateral compartment (Total Knee) 95330, Complex  2.  Medial Collateral Ligament Reconstruction, (Extra-articular), 60570  Complexity of the case was increased due to the patient's BMI / body morphology, chronic nature of the problem and the degree of preoperative genu valgum noted.         Pain   Associated symptoms include joint swelling. Pertinent negatives include no abdominal pain, chest pain, chills, congestion, coughing, fever, headaches, myalgias, nausea, numbness, rash, sore throat or vomiting.          Review of Systems   Constitution: Negative. Negative for chills, fever, weight gain and weight loss.   HENT: Negative for congestion and sore throat.    Eyes: Negative for blurred vision and double vision.   Cardiovascular: Negative for chest pain, leg swelling and palpitations.   Respiratory: Negative for cough and shortness of breath.    Skin: Negative for itching, poor wound healing and rash.   Musculoskeletal: Positive for joint pain, joint swelling, muscle weakness and stiffness. Negative for back pain and myalgias.   Gastrointestinal: Negative for  abdominal pain, constipation, diarrhea, nausea and vomiting.   Genitourinary: Negative.  Negative for frequency and hematuria.   Neurological: Negative for dizziness, headaches, numbness, paresthesias and sensory change.   Psychiatric/Behavioral: Negative for altered mental status and depression. The patient is not nervous/anxious.    Allergic/Immunologic: Negative for hives.       Pain Related Questions  Over the past 3 days, what was your average pain during activity? (I.e. running, jogging, walking, climbing stairs, getting dressed, ect.): 0  Over the past 3 days, what was your highest pain level?: 0  Over the past 3 days, what was your lowest pain level? : 0    Other  How many nights a week are you awakened by your affected body part?: 0  Was the patient's HEIGHT measured or patient reported?: Patient Reported  Was the patient's WEIGHT measured or patient reported?: Patient Reported      Objective:        General: Camila is well-developed, well-nourished, appears stated age, in no acute distress, alert and oriented to time, place and person.     General    Vitals reviewed.  Constitutional: She is oriented to person, place, and time. She appears well-developed and well-nourished. No distress.   HENT:   Head: Normocephalic and atraumatic.   Mouth/Throat: No oropharyngeal exudate.   Eyes: EOM are normal. Right eye exhibits no discharge. Left eye exhibits no discharge.   Neck: Normal range of motion.   Cardiovascular: Normal rate and regular rhythm.    Pulmonary/Chest: Effort normal and breath sounds normal. No respiratory distress.   Neurological: She is alert and oriented to person, place, and time. She has normal reflexes. No cranial nerve deficit. Coordination normal.   Psychiatric: She has a normal mood and affect. Her behavior is normal. Judgment and thought content normal.     General Musculoskeletal Exam   Gait: antalgic       Right Knee Exam     Inspection   Erythema: absent  Scars: present  Swelling:  absent  Effusion: effusion  Deformity: deformity  Bruising: absent    Tenderness   Right knee tenderness location: anterior knee pain.    Range of Motion   Extension: 0   Flexion: abnormal Right knee flexion: 95.     Tests   Meniscus   Pedro:  Medial - negative Lateral - negative  Ligament Examination Lachman: normal (-1 to 2mm) PCL-Posterior Drawer: normal (0 to 2mm)     MCL - Valgus: normal (0 to 2mm)  LCL - Varus: normalPivot Shift: normal (Equal)Reverse Pivot Shift: normal (Equal)Dial Test at 30 degrees: normal (< 5 degrees)Dial Test at 90 degrees: normal (< 5 degrees)  Posterior Sag Test: negative  Posterolateral Corner: unstable (>15 degrees difference)  Patella   Patellar Apprehension: negative  Passive Patellar Tilt: neutral  Patellar Tracking: normal  Patellar Glide (quadrants): Lateral - 1   Medial - 2  Q-Angle at 90 degrees: normal  Patellar Grind: negative  J-Sign: none    Other   Meniscal Cyst: absent  Popliteal (Baker's) Cyst: absent  Sensation: normal    Comments:  Severe genu valgum   Incision well healed.    Left Knee Exam     Inspection   Erythema: absent  Scars: absent  Swelling: absent  Effusion: absent  Deformity: deformity  Bruising: absent    Tenderness   The patient is experiencing no tenderness.         Range of Motion   Extension: 0   Left knee flexion: 105.     Tests   Meniscus   Pedro:  Medial - negative Lateral - negative  Stability Lachman: normal (-1 to 2mm) PCL-Posterior Drawer: normal (0 to 2mm)  MCL - Valgus: normal (0 to 2mm)  LCL - Varus: normal (0 to 2mm)Pivot Shift: normal (Equal)Reverse Pivot Shift: normal (Equal)Dial Test at 30 degrees: normal (< 5 degrees)Dial Test at 90 degrees: normal (< 5 degrees)  Posterior Sag Test: negative  Posterolateral Corner: unstable (>15 degrees difference)  Patella   Patellar Apprehension: negative  Passive Patellar Tilt: neutral  Patellar Tracking: normal  Patellar Glide (Quadrants): Lateral - 1 Medial - 2  Q-Angle at 90 degrees:  normal  Patellar Grind: positive  J-Sign: J sign absent    Other   Meniscal Cyst: absent  Popliteal (Baker's) Cyst: absent  Sensation: normal    Right Hip Exam     Tests   Venkata: negative  Left Hip Exam     Tests   Venkata: negative          Muscle Strength   Right Lower Extremity   Hip Abduction: 4/5   Quadriceps:  4/5   Hamstring:  3/5   Left Lower Extremity   Hip Abduction: 4/5   Quadriceps:  4/5   Hamstring:  3/5     Reflexes     Left Side  Quadriceps:  2+  Achilles:  2+    Right Side   Quadriceps:  2+  Achilles:  2+    Vascular Exam     Right Pulses  Dorsalis Pedis:      2+  Posterior Tibial:      2+        Left Pulses  Dorsalis Pedis:      2+  Posterior Tibial:      2+        Edema  Right Lower Leg: absent  Left Lower Leg: absent        Assessment:       Encounter Diagnoses   Name Primary?    Chronic pain of right knee Yes    S/P total knee replacement, right           Plan:         1. IKDC, SF-12 and KOOS was not filled out today in clinic.     RTC in 6 weeks with Dr. Grace Irvin Patient will fill out IKDC, SF-12 and KOOS on return.    2. Weight bearing as tolerated right lower extremity, no restrictions on ROM. DISCONTINUE T-scope. Transition to short runner.    3. Continue pain meds as needed    4. [id number] - Paresh Miranda, performed a custom orthotic / brace adjustment, fitting and training with the patient. The patient demonstrated understanding and proper care. This was performed for 15 minutes. Short runner brace applied    5. Continue ambulation with cane as tolerated.                                             Patient questionnaires may have been collected.

## 2017-09-12 ENCOUNTER — CLINICAL SUPPORT (OUTPATIENT)
Dept: REHABILITATION | Facility: HOSPITAL | Age: 47
End: 2017-09-12
Attending: ORTHOPAEDIC SURGERY
Payer: COMMERCIAL

## 2017-09-12 DIAGNOSIS — M25.561 ACUTE PAIN OF RIGHT KNEE: ICD-10-CM

## 2017-09-12 DIAGNOSIS — Z96.651 STATUS POST TOTAL RIGHT KNEE REPLACEMENT: ICD-10-CM

## 2017-09-12 PROCEDURE — 97110 THERAPEUTIC EXERCISES: CPT

## 2017-09-12 NOTE — PROGRESS NOTES
Physical Therapy Daily Note     Date: 09/12/2017  Name: Camila Black  Clinic Number: [id number]  Diagnosis:   Encounter Diagnoses   Name Primary?    Acute pain of right knee     Status post total right knee replacement      Physician: Grace Irvin MD    Evaluation Date: 8/17/17  Date of Surgery: 7/28/17  Visit #: 6  Start Time:  1100  Stop Time:  1205  Total Treatment Time: 65    Precautions: standard, s/p R TKA with MCL reconstruction      Subjective     Pt reports she is wearing a better knee brace that is helping.  Pain: 0/10    Objective     Measurements taken: (9/8/17)  R knee:  Flex: 84 deg (prone)  Ext: 0 deg     Patient received group therapy to increase strength, endurance, ROM and flexibility with activities as follows:     Camila received therapeutic exercises to develop strength, endurance, ROM, flexibility and core stabilization for 55 minutes including:   Stat bike in available ROM x10 min  Quad sets 5 sec x30 on bolster  Heel slides x30-np  3 way hip 1.5# x30  Prone hang 4# x3 min  Leg press 120# dbl leg x30  Heel raise on leg press 120# x30  LAQ with ball squeeze x30 1.5#  AAROM knee flex stretch off table 2x 1 min hold-np  Prone with strap 10 sec x10 knee flex  Gait training with cones x3 laps-np  HSS x1 min B  GSS x2 min     Treatment ended with ice x10 min      Written Home Exercises Provided: provided at initial eval  Pt demo good understanding of the education provided. Camila demonstrated good return demonstration of activities.     Education provided:  Camila verbalized good understanding of education provided.   No spiritual or educational barriers to learning identified.    Assessment   Patient continues to show good tolerance to leg press exercise and is improving quad strength. She is demonstrating good benefit from treatment. Patient would continue to benefit from PT intervention to progress toward functional  goals.  This is a 47 y.o. female referred to outpatient physical therapy and presents with a medical diagnosis of s/p R TKA with MCL reconstruction and demonstrates limitations as described in the problem list Pt prognosis is Good. Pt will continue to benefit from skilled outpatient physical therapy to address the deficits listed below in the problem list, provide pt/family education and to maximize pt's level of independence in the home and community environment.    Will the patient continue to benefit from skilled PT intervention? yes        Medical necessity is demonstrated by:   - Pain limits function of effected part for all activities  - Unable to participate in daily activities     Progress towards goals: good    New/Revised Goals:none this visit       Plan   Continue with established Plan of Care towards PT goals.      Therapist: Rosmery Spencer, PT, DPT

## 2017-09-15 ENCOUNTER — CLINICAL SUPPORT (OUTPATIENT)
Dept: REHABILITATION | Facility: HOSPITAL | Age: 47
End: 2017-09-15
Attending: ORTHOPAEDIC SURGERY
Payer: COMMERCIAL

## 2017-09-15 DIAGNOSIS — Z96.651 STATUS POST TOTAL RIGHT KNEE REPLACEMENT: ICD-10-CM

## 2017-09-15 DIAGNOSIS — M25.561 ACUTE PAIN OF RIGHT KNEE: ICD-10-CM

## 2017-09-15 PROCEDURE — 97110 THERAPEUTIC EXERCISES: CPT

## 2017-09-15 NOTE — PROGRESS NOTES
Physical Therapy Daily Note     Date: 09/15/2017  Name: Camila Black  Clinic Number: [id number]  Diagnosis:   Encounter Diagnoses   Name Primary?    Acute pain of right knee     Status post total right knee replacement      Physician: Grace Irvin MD    Evaluation Date: 8/17/17  Date of Surgery: 7/28/17  Visit #: 7  Start Time:  1100  Stop Time:  1205  Total Treatment Time: 65    Precautions: standard, s/p R TKA with MCL reconstruction      Subjective     Pt reports she is feeling good.  Pain: 0/10    Objective     Measurements taken: (9/15/17)  R knee:  Flex: 95 deg (prone)  Ext: 0 deg     Patient received group therapy to increase strength, endurance, ROM and flexibility with activities as follows:     Camila received therapeutic exercises to develop strength, endurance, ROM, flexibility and core stabilization for 55 minutes including:   Stat bike in available ROM x10 min  Quad sets 5 sec x30 on bolster-np  Heel slides x30  3 way hip 1.5# x30  Prone hang 4# x4 min  Leg press 140# dbl leg x30  Heel raise on leg press 140# x30  LAQ with ball squeeze x30 1.5#  Lateral walking x1 lap  Prone with strap 10 sec x10 knee flex  HSS x2 min   GSS x2 min-np     Treatment ended with ice x10 min      Written Home Exercises Provided: provided at initial eval  Pt demo good understanding of the education provided. Camila demonstrated good return demonstration of activities.     Education provided:  Camila verbalized good understanding of education provided.   No spiritual or educational barriers to learning identified.    Assessment   Patient required cuing to slow down exercise repetitions. She is demonstrating good benefit from treatment. Patient would continue to benefit from PT intervention to progress toward functional goals.  This is a 47 y.o. female referred to outpatient physical therapy and presents with a medical diagnosis of s/p R TKA with MCL  reconstruction and demonstrates limitations as described in the problem list Pt prognosis is Good. Pt will continue to benefit from skilled outpatient physical therapy to address the deficits listed below in the problem list, provide pt/family education and to maximize pt's level of independence in the home and community environment.    Will the patient continue to benefit from skilled PT intervention? yes        Medical necessity is demonstrated by:   - Pain limits function of effected part for all activities  - Unable to participate in daily activities     Progress towards goals: good    New/Revised Goals:none this visit       Plan   Continue with established Plan of Care towards PT goals.      Therapist: Rosmery Spencer, PT, DPT

## 2017-09-20 ENCOUNTER — OFFICE VISIT (OUTPATIENT)
Dept: UROLOGY | Facility: CLINIC | Age: 47
End: 2017-09-20
Payer: COMMERCIAL

## 2017-09-20 VITALS
WEIGHT: 282.88 LBS | DIASTOLIC BLOOD PRESSURE: 107 MMHG | HEIGHT: 66 IN | HEART RATE: 77 BPM | SYSTOLIC BLOOD PRESSURE: 138 MMHG | BODY MASS INDEX: 45.46 KG/M2

## 2017-09-20 DIAGNOSIS — R39.15 URINARY URGENCY: ICD-10-CM

## 2017-09-20 DIAGNOSIS — N39.41 URGE INCONTINENCE: ICD-10-CM

## 2017-09-20 PROCEDURE — 3075F SYST BP GE 130 - 139MM HG: CPT | Mod: S$GLB,,, | Performed by: UROLOGY

## 2017-09-20 PROCEDURE — 3008F BODY MASS INDEX DOCD: CPT | Mod: S$GLB,,, | Performed by: UROLOGY

## 2017-09-20 PROCEDURE — 99999 PR PBB SHADOW E&M-EST. PATIENT-LVL III: CPT | Mod: PBBFAC,,, | Performed by: UROLOGY

## 2017-09-20 PROCEDURE — 81002 URINALYSIS NONAUTO W/O SCOPE: CPT | Mod: S$GLB,,, | Performed by: UROLOGY

## 2017-09-20 PROCEDURE — 99214 OFFICE O/P EST MOD 30 MIN: CPT | Mod: 25,S$GLB,, | Performed by: UROLOGY

## 2017-09-20 PROCEDURE — 3080F DIAST BP >= 90 MM HG: CPT | Mod: S$GLB,,, | Performed by: UROLOGY

## 2017-09-20 RX ORDER — OXYBUTYNIN CHLORIDE 15 MG/1
15 TABLET, EXTENDED RELEASE ORAL DAILY
Qty: 90 TABLET | Refills: 3 | Status: SHIPPED | OUTPATIENT
Start: 2017-09-20 | End: 2017-11-20 | Stop reason: SDUPTHER

## 2017-09-21 ENCOUNTER — CLINICAL SUPPORT (OUTPATIENT)
Dept: REHABILITATION | Facility: HOSPITAL | Age: 47
End: 2017-09-21
Attending: ORTHOPAEDIC SURGERY
Payer: COMMERCIAL

## 2017-09-21 DIAGNOSIS — Z96.651 STATUS POST TOTAL RIGHT KNEE REPLACEMENT: ICD-10-CM

## 2017-09-21 DIAGNOSIS — M25.561 ACUTE PAIN OF RIGHT KNEE: ICD-10-CM

## 2017-09-21 PROCEDURE — 97110 THERAPEUTIC EXERCISES: CPT

## 2017-09-21 NOTE — PROGRESS NOTES
CHIEF COMPLAINT:    Mrs. Black is a 47 y.o. female presenting for an annual follow up on urinary urgency, frequency.    PRESENTING ILLNESS:    Camila Black is a 47 y.o. female who returns stating that her biggest frustration is that her urgency, frequency symptoms are inconsistent.  Sometimes she has no symptoms and others occur at a very inopportune time.  She had her right knee replaced and it was in an immobilizer and she experienced frequency every 10 minutes.  She denies any gross hematuria.  She has not had a problem with side effects from the Ditropan XL 15 mg.  She states that one factor that has occurred over the past several years is weight gain because she could not work out due to knee pain.  She has lost weight in the past after gastric bypass surgery and did very well but gained 50 lbs back.  She plans to work out once she is cleared from the knee surgery.     REVIEW OF SYSTEMS:    Review of Systems   Constitutional: Negative.    HENT: Negative.    Eyes: Negative.    Respiratory: Negative.    Cardiovascular: Negative.    Gastrointestinal: Negative.    Genitourinary: Positive for frequency and urgency.   Musculoskeletal: Positive for back pain.   Skin: Negative.    Neurological: Negative.    Endo/Heme/Allergies: Negative.    Psychiatric/Behavioral: Negative.      PATIENT HISTORY:    Past Medical History:   Diagnosis Date    Arthritis 2012    OA    B12 deficiency 5/1/2017    Basal cell carcinoma 2/2010    right scalp    Cancer 2010    SKIN CANCER TO SCALP    Hypertension     Morbid obesity with BMI of 40.0-44.9, adult 7/25/2014    S/P gastric bypass 7/13/2015       Past Surgical History:   Procedure Laterality Date    ABDOMINAL SURGERY  2007    GASTRIC BYPASS    C- SECTION X2  1995 AND 2005    DILATION AND CURETTAGE OF UTERUS      ENDOMETRIAL ABLATION      KNEE SURGERY      TUBAL LIGATION         Family History   Problem Relation Age of Onset    Hypertension Mother     COPD  Mother     Sarcoidosis Mother     Cancer Mother      cervical    Kidney disease Father      Dialysis, transplants    Hypertension Father     Hypertension Sister     Fibromyalgia Sister     Arthritis Sister     Cancer Paternal Grandmother      breast     Social History    Marital status:      Social History Main Topics    Smoking status: Never Smoker    Smokeless tobacco: Never Used    Alcohol use No    Drug use: No    Sexual activity: Not on file      Comment: LMP 9/30/12       Allergies:  Review of patient's allergies indicates no known allergies.    Medications:  Outpatient Encounter Prescriptions as of 9/20/2017   Medication Sig Dispense Refill    celecoxib (CELEBREX) 200 MG capsule Take 1 capsule (200 mg total) by mouth 2 (two) times daily. 60 capsule 2    cholecalciferol, vitamin D3, 1,000 unit capsule Take 1 capsule (1,000 Units total) by mouth once daily. 30 capsule 12    cyanocobalamin (VITAMIN B-12) 1000 MCG tablet Take 1 tablet (1,000 mcg total) by mouth once daily. 30 tablet 12    multivitamin capsule Take 1 capsule by mouth once daily.        oxybutynin (DITROPAN XL) 15 MG TR24 Take 1 tablet (15 mg total) by mouth once daily. 90 tablet 3    triamcinolone acetonide 0.1% (KENALOG) 0.1 % ointment 1 application 2 (two) times daily. Apply to affected area  12    [DISCONTINUED] oxybutynin (DITROPAN XL) 15 MG TR24 TAKE 1 TABLET (15 MG TOTAL) BY MOUTH ONCE DAILY. 30 tablet 0    aspirin (ECOTRIN) 325 MG EC tablet Take 1 tablet (325 mg total) by mouth once daily. 42 tablet 0    econazole nitrate 1 % cream Apply topically once daily. Mix in hydrocortisone cream and aaa qhs 85 g 1    [DISCONTINUED] oxycodone-acetaminophen (PERCOCET)  mg per tablet Take 1 tablet by mouth every 6 (six) hours as needed for Pain. 60 tablet 0     No facility-administered encounter medications on file as of 9/20/2017.          PHYSICAL EXAMINATION:    The patient generally appears in good health, is  appropriately interactive, and is in no apparent distress.    Skin: No lesions.    Mental: Cooperative with normal affect.    Neuro: Grossly intact.    HEENT: Normal. No evidence of lymphadenopathy.    Chest:  normal inspiratory effort.    Abdomen:  Soft, non-tender. No masses or organomegaly. Bladder is not palpable. No evidence of flank discomfort. No evidence of inguinal hernia.    Extremities: No clubbing, cyanosis, or edema      LABS:    Lab Results   Component Value Date    BUN 13 05/03/2017    CREATININE 0.8 05/03/2017     UA 1.015, pH 5, otherwise, negative      IMPRESSION:    Encounter Diagnoses   Name Primary?    Urinary urgency     Urge incontinence        PLAN:    1.  Refilled the oxybutynin XL 15 mg  2.  Bladder Matters book given for behavioral therapies.  Discussed that since it is intermittent, that there may be a dietary trigger.  Suggested keeping a food, voiding journal to ID the trigger(s).  3.  She wants to work on weight loss, discussed that this will help her symptoms.  4.  Discussed if she wishes to move forward with tertiary therapies (neuromodulation or Botox)  That I would first work her up with urodynamics and cystosocpy.    5.  Follow up in 1 year, sooner if she decides she would like to try other therapies

## 2017-09-21 NOTE — PROGRESS NOTES
Physical Therapy Daily Note     Date: 09/21/2017  Name: Camila Black  Clinic Number: [id number]  Diagnosis:   Encounter Diagnoses   Name Primary?    Acute pain of right knee     Status post total right knee replacement      Physician: Grace Irvin MD    Evaluation Date: 8/17/17  Date of Surgery: 7/28/17  Visit #: 8  Start Time:  1010  Stop Time:  1105  Total Treatment Time: 55    Precautions: standard, s/p R TKA with MCL reconstruction      Subjective     Pt reports she is feeling good.  Pain: 0/10    Objective     Measurements taken: (9/15/17)  R knee:  Flex: 95 deg (prone)  Ext: 0 deg     Patient received group therapy to increase strength, endurance, ROM and flexibility with activities as follows:     Camila received therapeutic exercises to develop strength, endurance, ROM, flexibility and core stabilization for 45 minutes including:   Stat bike in available ROM x10 min  Quad sets 5 sec x30 on bolster-np  Heel slides x30-np  3 way hip 1.5# x30  Prone hang 4# x4 min  Leg press 140# dbl leg x30  Heel raise on leg press 140# x30  U leg press R 80# x30  LAQ with ball squeeze x30 1.5#  Lateral walking x1 lap  Prone with strap 10 sec x10 knee flex  HSS x2 min   GSS x2 min-np     Treatment ended with ice x10 min      Written Home Exercises Provided: provided at initial eval  Pt demo good understanding of the education provided. Caimla demonstrated good return demonstration of activities.     Education provided:  Camila verbalized good understanding of education provided.   No spiritual or educational barriers to learning identified.    Assessment   Patient is tolerating SL resistance well and is showing better gait. She is demonstrating good benefit from treatment. Patient would continue to benefit from PT intervention to progress toward functional goals.  This is a 47 y.o. female referred to outpatient physical therapy and presents with a medical  diagnosis of s/p R TKA with MCL reconstruction and demonstrates limitations as described in the problem list Pt prognosis is Good. Pt will continue to benefit from skilled outpatient physical therapy to address the deficits listed below in the problem list, provide pt/family education and to maximize pt's level of independence in the home and community environment.    Will the patient continue to benefit from skilled PT intervention? yes        Medical necessity is demonstrated by:   - Pain limits function of effected part for all activities  - Unable to participate in daily activities     Progress towards goals: good    New/Revised Goals:none this visit       Plan   Continue with established Plan of Care towards PT goals.      Therapist: Rosmery Spencer, PT, DPT

## 2017-09-26 ENCOUNTER — CLINICAL SUPPORT (OUTPATIENT)
Dept: REHABILITATION | Facility: HOSPITAL | Age: 47
End: 2017-09-26
Attending: ORTHOPAEDIC SURGERY
Payer: COMMERCIAL

## 2017-09-26 DIAGNOSIS — Z96.651 STATUS POST TOTAL RIGHT KNEE REPLACEMENT: ICD-10-CM

## 2017-09-26 DIAGNOSIS — M25.561 ACUTE PAIN OF RIGHT KNEE: ICD-10-CM

## 2017-09-26 PROCEDURE — 97110 THERAPEUTIC EXERCISES: CPT

## 2017-09-26 NOTE — PROGRESS NOTES
Physical Therapy Daily Note     Date: 09/26/2017  Name: Camila Black  Clinic Number: [id number]  Diagnosis:   Encounter Diagnoses   Name Primary?    Acute pain of right knee     Status post total right knee replacement      Physician: Grace Irvin MD    Evaluation Date: 8/17/17  Date of Surgery: 7/28/17  Visit #: 9  Start Time:  910  Stop Time:  1005  Total Treatment Time: 55    Precautions: standard, s/p R TKA with MCL reconstruction      Subjective     Pt reports she had a good weekend.  Pain: 0/10    Objective     Measurements taken: (9/15/17)  R knee:  Flex: 95 deg (prone)  Ext: 0 deg     Patient received group therapy to increase strength, endurance, ROM and flexibility with activities as follows:     Camila received therapeutic exercises to develop strength, endurance, ROM, flexibility and core stabilization for 45 minutes including:   Stat bike in available ROM x10 min  Quad sets 5 sec x30 on bolster-np  Heel slides x30-np  3 way hip 2# x30  Prone hang 4# x4 min  Leg press 140# dbl leg x30  Heel raise on leg press 140# x30  U leg press R 80# x30  LAQ with ball squeeze x30 2#  Lateral walking x1 lap ytb-np  Prone with strap 10 sec x10 knee flex  HSS x2 min   GSS x2 min     Treatment ended with ice x10 min      Written Home Exercises Provided: provided at initial eval  Pt demo good understanding of the education provided. Camila demonstrated good return demonstration of activities.     Education provided:  Camila verbalized good understanding of education provided.   No spiritual or educational barriers to learning identified.    Assessment   Patient tolerated increased resistance well this visit with no complaints of pain or compensations. She is demonstrating good benefit from treatment. Patient would continue to benefit from PT intervention to progress toward functional goals.  This is a 47 y.o. female referred to outpatient physical  therapy and presents with a medical diagnosis of s/p R TKA with MCL reconstruction and demonstrates limitations as described in the problem list Pt prognosis is Good. Pt will continue to benefit from skilled outpatient physical therapy to address the deficits listed below in the problem list, provide pt/family education and to maximize pt's level of independence in the home and community environment.    Will the patient continue to benefit from skilled PT intervention? yes        Medical necessity is demonstrated by:   - Pain limits function of effected part for all activities  - Unable to participate in daily activities     Progress towards goals: good    New/Revised Goals:none this visit       Plan   Continue with established Plan of Care towards PT goals.      Therapist: Rosmery Spencer, PT, DPT

## 2017-09-28 ENCOUNTER — CLINICAL SUPPORT (OUTPATIENT)
Dept: REHABILITATION | Facility: HOSPITAL | Age: 47
End: 2017-09-28
Attending: ORTHOPAEDIC SURGERY
Payer: COMMERCIAL

## 2017-09-28 DIAGNOSIS — M25.561 ACUTE PAIN OF RIGHT KNEE: ICD-10-CM

## 2017-09-28 DIAGNOSIS — Z96.651 STATUS POST TOTAL RIGHT KNEE REPLACEMENT: ICD-10-CM

## 2017-09-28 PROCEDURE — 97110 THERAPEUTIC EXERCISES: CPT

## 2017-09-28 NOTE — PROGRESS NOTES
Physical Therapy Daily Note     Date: 09/28/2017  Name: Camila Black  Clinic Number: [id number]  Diagnosis:   Encounter Diagnoses   Name Primary?    Acute pain of right knee     Status post total right knee replacement      Physician: Grace Irvin MD    Evaluation Date: 8/17/17  Date of Surgery: 7/28/17  Visit #: 10  Start Time:  907  Stop Time:  1100  Total Treatment Time: 53    Precautions: standard, s/p R TKA with MCL reconstruction      Subjective     Pt reports she is feeling good.  Pain: 0/10    Objective     Measurements taken: (9/15/17)  R knee:  Flex: 95 deg (prone)  Ext: 0 deg     Patient received group therapy to increase strength, endurance, ROM and flexibility with activities as follows:     Camila received therapeutic exercises to develop strength, endurance, ROM, flexibility and core stabilization for 45 minutes including:   Stat bike in available ROM x10 min  Quad sets 5 sec x30 on bolster-np  Heel slides x30-np  3 way hip 2# x30  Prone hang 4# x4 min  Leg press 140# dbl leg x30  Heel raise on leg press 140# x30  U leg press R 80# x30  LAQ with ball squeeze x30 2#  Lateral walking x1 lap ytb-np  Prone with strap 10 sec x10 knee flex  HSS x2 min   GSS x2 min     Treatment ended with ice x10 min      Written Home Exercises Provided: provided at initial eval  Pt demo good understanding of the education provided. Camila demonstrated good return demonstration of activities.     Education provided:  Camila verbalized good understanding of education provided.   No spiritual or educational barriers to learning identified.    Assessment   Patient is showing good quad control on R knee. Patient improved hip and quad strength each visit. She is demonstrating good benefit from treatment. Patient would continue to benefit from PT intervention to progress toward functional goals.  This is a 47 y.o. female referred to outpatient physical therapy  and presents with a medical diagnosis of s/p R TKA with MCL reconstruction and demonstrates limitations as described in the problem list Pt prognosis is Good. Pt will continue to benefit from skilled outpatient physical therapy to address the deficits listed below in the problem list, provide pt/family education and to maximize pt's level of independence in the home and community environment.    Will the patient continue to benefit from skilled PT intervention? yes        Medical necessity is demonstrated by:   - Pain limits function of effected part for all activities  - Unable to participate in daily activities     Progress towards goals: good    New/Revised Goals:none this visit       Plan   Continue with established Plan of Care towards PT goals.      Therapist: Rosmery Spencer, PT, DPT

## 2017-10-03 ENCOUNTER — CLINICAL SUPPORT (OUTPATIENT)
Dept: REHABILITATION | Facility: HOSPITAL | Age: 47
End: 2017-10-03
Attending: ORTHOPAEDIC SURGERY
Payer: COMMERCIAL

## 2017-10-03 DIAGNOSIS — Z96.651 STATUS POST TOTAL RIGHT KNEE REPLACEMENT: ICD-10-CM

## 2017-10-03 DIAGNOSIS — M25.561 ACUTE PAIN OF RIGHT KNEE: ICD-10-CM

## 2017-10-03 PROCEDURE — 97110 THERAPEUTIC EXERCISES: CPT

## 2017-10-03 NOTE — PROGRESS NOTES
Physical Therapy Daily Note     Date: 10/03/2017  Name: Camila Black  Clinic Number: [id number]  Diagnosis:   Encounter Diagnoses   Name Primary?    Acute pain of right knee     Status post total right knee replacement      Physician: Grace Irvin MD    Evaluation Date: 8/17/17  Date of Surgery: 7/28/17  Visit #: 11  Start Time:  912  Stop Time:  1015  Total Treatment Time: 63    Precautions: standard, s/p R TKA with MCL reconstruction      Subjective     Pt reports she is having a good day.  Pain: 0/10    Objective     Measurements taken: (9/15/17)  R knee:  Flex: 95 deg (prone)  Ext: 0 deg     Patient received group therapy to increase strength, endurance, ROM and flexibility with activities as follows:     Camila received therapeutic exercises to develop strength, endurance, ROM, flexibility and core stabilization for 50 minutes including:   Stat bike in available ROM x10 min  Quad sets 5 sec x30 on bolster-np  Heel slides x30-np  3 way hip 2# x30  Prone hang 4# x3 min  Leg press 140# dbl leg x30  Heel raise on leg press 140# x30  U leg press R 100# x30  LAQ with ball squeeze x30 2#  Lateral walking x1 lap ytb  Prone with strap 10 sec x10 knee flex  HSS x2 min   GSS x2 min     Treatment ended with ice x10 min      Written Home Exercises Provided: provided at initial eval  Pt demo good understanding of the education provided. Camila demonstrated good return demonstration of activities.     Education provided:  Camila verbalized good understanding of education provided.   No spiritual or educational barriers to learning identified.    Assessment   Patient is showing slight improved hip abd strength this visit and better control with movement. She is demonstrating good benefit from treatment. Patient would continue to benefit from PT intervention to progress toward functional goals.  This is a 47 y.o. female referred to outpatient physical therapy  and presents with a medical diagnosis of s/p R TKA with MCL reconstruction and demonstrates limitations as described in the problem list Pt prognosis is Good. Pt will continue to benefit from skilled outpatient physical therapy to address the deficits listed below in the problem list, provide pt/family education and to maximize pt's level of independence in the home and community environment.    Will the patient continue to benefit from skilled PT intervention? yes        Medical necessity is demonstrated by:   - Pain limits function of effected part for all activities  - Unable to participate in daily activities     Progress towards goals: good    New/Revised Goals:none this visit       Plan   Continue with established Plan of Care towards PT goals.      Therapist: Rosmery Spencer, PT, DPT

## 2017-10-05 ENCOUNTER — PATIENT OUTREACH (OUTPATIENT)
Dept: OTHER | Facility: OTHER | Age: 47
End: 2017-10-05

## 2017-10-05 ENCOUNTER — CLINICAL SUPPORT (OUTPATIENT)
Dept: REHABILITATION | Facility: HOSPITAL | Age: 47
End: 2017-10-05
Attending: ORTHOPAEDIC SURGERY
Payer: COMMERCIAL

## 2017-10-05 DIAGNOSIS — Z96.651 STATUS POST TOTAL RIGHT KNEE REPLACEMENT: ICD-10-CM

## 2017-10-05 DIAGNOSIS — M25.561 ACUTE PAIN OF RIGHT KNEE: ICD-10-CM

## 2017-10-05 PROCEDURE — 97110 THERAPEUTIC EXERCISES: CPT

## 2017-10-05 NOTE — PROGRESS NOTES
"                                                  Physical Therapy Daily Note     Date: 10/05/2017  Name: Camila Black  Clinic Number: [id number]  Diagnosis:   Encounter Diagnoses   Name Primary?    Acute pain of right knee     Status post total right knee replacement      Physician: Grace Irvin MD    Evaluation Date: 8/17/17  Date of Surgery: 7/28/17  Visit #: 12  Start Time:  915  Stop Time:  1010  Total Treatment Time: 55    Precautions: standard, s/p R TKA with MCL reconstruction      Subjective     Pt reports she has no pain when she walks, but she has to remind herself to stand up straight.  Pain: 0/10    Objective     Measurements taken: (9/15/17)  R knee:  Flex: 95 deg (prone)  Ext: 0 deg     Patient received group therapy to increase strength, endurance, ROM and flexibility with activities as follows:     Camila received therapeutic exercises to develop strength, endurance, ROM, flexibility and core stabilization for 50 minutes including:   Stat bike in available ROM x10 min  Heel taps 4" x30  Heel slides x30-np  3 way hip 2.5# x30  Prone hang 4# x3 min  Leg press 140# dbl leg x30  Heel raise on leg press 140# x30  U leg press R 100# x30  LAQ with ball squeeze x30 2.5#  Lateral walking x1 lap ytb  Prone with strap 10 sec x10 knee flex  HSS x2 min   GSS x2 min     Treatment ended with ice x10 min      Written Home Exercises Provided: provided at initial eval  Pt demo good understanding of the education provided. Camila demonstrated good return demonstration of activities.     Education provided:  Camila verbalized good understanding of education provided.   No spiritual or educational barriers to learning identified.    Assessment   Patient showed weak quad functionally with heel taps and would benefit from additional functional training.  She is demonstrating good benefit from treatment. Patient would continue to benefit from PT intervention to progress toward functional goals.  This is " a 47 y.o. female referred to outpatient physical therapy and presents with a medical diagnosis of s/p R TKA with MCL reconstruction and demonstrates limitations as described in the problem list Pt prognosis is Good. Pt will continue to benefit from skilled outpatient physical therapy to address the deficits listed below in the problem list, provide pt/family education and to maximize pt's level of independence in the home and community environment.    Will the patient continue to benefit from skilled PT intervention? yes        Medical necessity is demonstrated by:   - Pain limits function of effected part for all activities  - Unable to participate in daily activities     Progress towards goals: good    New/Revised Goals:none this visit       Plan   Continue with established Plan of Care towards PT goals.      Therapist: Rosmery Spencer, PT, DPT

## 2017-10-05 NOTE — PROGRESS NOTES
"Last 5 Patient Entered Redings Current 30 Day Average: 103/68     Recent Readings 10/3/2017 9/29/2017 9/21/2017 9/14/2017 9/6/2017    Systolic BP (mmHg) 113 94 94 112 103    Diastolic BP (mmHg) 64 67 69 67 72    Pulse 62 84 72 50 69        Hypertension Digital Medicine Program (HDMP): Health  Follow Up    Lifestyle Modifications:    1.Low sodium diet: yes Patient reports that she is maintaining her low sodium diet and increasing fluid intake (water).     2.Physical activity: yes Ms. Black is continuing to attend physical therapy.     3.Hypotension/Hypertension symptoms: yes Patient states that she is finding that when she is laying down, specifically at physical therapy, she gets dizzy/lightheaded "for a minute".  Frequency/Alleviating factors/Precipitating factors, etc.     4. No current medication regimen.     Follow up with Ms. Camila Black completed. Ms. Black is doing well. No further questions or concerns. I will follow up in a few weeks to assess progress.         "

## 2017-10-10 ENCOUNTER — CLINICAL SUPPORT (OUTPATIENT)
Dept: REHABILITATION | Facility: HOSPITAL | Age: 47
End: 2017-10-10
Attending: ORTHOPAEDIC SURGERY
Payer: COMMERCIAL

## 2017-10-10 DIAGNOSIS — M25.561 ACUTE PAIN OF RIGHT KNEE: ICD-10-CM

## 2017-10-10 DIAGNOSIS — Z96.651 STATUS POST TOTAL RIGHT KNEE REPLACEMENT: ICD-10-CM

## 2017-10-10 PROCEDURE — 97110 THERAPEUTIC EXERCISES: CPT

## 2017-10-10 NOTE — PROGRESS NOTES
"                                                  Physical Therapy Daily Note     Date: 10/10/2017  Name: Camila Black  Clinic Number: [id number]  Diagnosis:   Encounter Diagnoses   Name Primary?    Acute pain of right knee     Status post total right knee replacement      Physician: Grace Irvin MD    Evaluation Date: 8/17/17  Date of Surgery: 7/28/17  Visit #: 13  Start Time:  910  Stop Time:  1010  Total Treatment Time: 60    Precautions: standard, s/p R TKA with MCL reconstruction      Subjective     Pt reports she is feeling good today.  Pain: 0/10    Objective     Measurements taken: (10/10/17)  R knee:  Flex: 102 deg (prone)  Ext: 0 deg     Patient received group therapy to increase strength, endurance, ROM and flexibility with activities as follows:     Camila received therapeutic exercises to develop strength, endurance, ROM, flexibility and core stabilization for 50 minutes including:   Stat bike in available ROM x10 min  Heel taps 4" x30  Heel slides x30-np  3 way hip 2.5# x30 R  HS curl 2.5# x30 R  Prone hang 4# x3 min-np  Leg press 140# dbl leg x30  Heel raise on leg press 140# x30  U leg press R 100# x30  LAQ with ball squeeze x30 2.5#  Lateral walking x2 laps  Prone with strap 10 sec x10 knee flex  HSS x2 min   GSS x2 min     Treatment ended with ice x10 min      Written Home Exercises Provided: provided at initial eval  Pt demo good understanding of the education provided. Camila demonstrated good return demonstration of activities.     Education provided:  Camila verbalized good understanding of education provided.   No spiritual or educational barriers to learning identified.    Assessment   Patient was able to make a full revolution on bike this visit and demonstrated improved PROM R knee flexion. She is demonstrating good benefit from treatment. Patient would continue to benefit from PT intervention to progress toward functional goals.  This is a 47 y.o. female referred to " outpatient physical therapy and presents with a medical diagnosis of s/p R TKA with MCL reconstruction and demonstrates limitations as described in the problem list Pt prognosis is Good. Pt will continue to benefit from skilled outpatient physical therapy to address the deficits listed below in the problem list, provide pt/family education and to maximize pt's level of independence in the home and community environment.    Will the patient continue to benefit from skilled PT intervention? yes        Medical necessity is demonstrated by:   - Pain limits function of effected part for all activities  - Unable to participate in daily activities     Progress towards goals: good    New/Revised Goals:none this visit       Plan   Continue with established Plan of Care towards PT goals.      Therapist: Rosmery Spencer, PT, DPT

## 2017-10-17 ENCOUNTER — CLINICAL SUPPORT (OUTPATIENT)
Dept: REHABILITATION | Facility: HOSPITAL | Age: 47
End: 2017-10-17
Attending: ORTHOPAEDIC SURGERY
Payer: COMMERCIAL

## 2017-10-17 DIAGNOSIS — Z96.651 STATUS POST TOTAL RIGHT KNEE REPLACEMENT: ICD-10-CM

## 2017-10-17 DIAGNOSIS — M25.561 ACUTE PAIN OF RIGHT KNEE: ICD-10-CM

## 2017-10-17 PROCEDURE — 97110 THERAPEUTIC EXERCISES: CPT

## 2017-10-17 NOTE — PROGRESS NOTES
"                                                  Physical Therapy Daily Note     Date: 10/17/2017  Name: Camila Black  Clinic Number: [id number]  Diagnosis:   Encounter Diagnoses   Name Primary?    Acute pain of right knee     Status post total right knee replacement      Physician: Grace Irvin MD    Evaluation Date: 8/17/17  Date of Surgery: 7/28/17  Visit #: 14  Start Time:  200  Stop Time:  300  Total Treatment Time: 60    Precautions: standard, s/p R TKA with MCL reconstruction      Subjective     Pt reports she is feeling okay today.  Pain: 0/10    Objective     Measurements taken: (10/10/17)  R knee:  Flex: 102 deg (prone)  Ext: 0 deg     Patient received group therapy to increase strength, endurance, ROM and flexibility with activities as follows:     Camila received therapeutic exercises to develop strength, endurance, ROM, flexibility and core stabilization for 50 minutes including:   Stat bike in available ROM x10 min  Heel taps 4" x30  Heel slides x30-np  3 way hip 3# x30 R  HS curl 3# x30 R  Leg press 160# dbl leg x30  Heel raise on leg press 140# x30  U leg press R 100# x30  LAQ with ball squeeze x30 3#  Lateral walking x1 lap ytb  Prone with strap 10 sec x10 knee flex-np  HSS x2 min   GSS x2 min     Treatment ended with ice x10 min      Written Home Exercises Provided: provided at initial eval  Pt demo good understanding of the education provided. Camila demonstrated good return demonstration of activities.     Education provided:  Camila verbalized good understanding of education provided.   No spiritual or educational barriers to learning identified.    Assessment   Patient is showing improved quad firing and increased knee flexion with functional exercises.  She is demonstrating good benefit from treatment. Patient would continue to benefit from PT intervention to progress toward functional goals.  This is a 47 y.o. female referred to outpatient physical therapy and presents " with a medical diagnosis of s/p R TKA with MCL reconstruction and demonstrates limitations as described in the problem list Pt prognosis is Good. Pt will continue to benefit from skilled outpatient physical therapy to address the deficits listed below in the problem list, provide pt/family education and to maximize pt's level of independence in the home and community environment.    Will the patient continue to benefit from skilled PT intervention? yes        Medical necessity is demonstrated by:   - Pain limits function of effected part for all activities  - Unable to participate in daily activities     Progress towards goals: good    New/Revised Goals:none this visit       Plan   Continue with established Plan of Care towards PT goals.      Therapist: Rosmery Spencer, PT, DPT

## 2017-10-23 ENCOUNTER — HOSPITAL ENCOUNTER (OUTPATIENT)
Dept: RADIOLOGY | Facility: HOSPITAL | Age: 47
Discharge: HOME | End: 2017-10-23
Attending: ORTHOPAEDIC SURGERY
Payer: COMMERCIAL

## 2017-10-23 ENCOUNTER — OFFICE VISIT (OUTPATIENT)
Dept: SPORTS MEDICINE | Facility: CLINIC | Age: 47
End: 2017-10-23
Payer: COMMERCIAL

## 2017-10-23 VITALS
HEART RATE: 68 BPM | HEIGHT: 66 IN | WEIGHT: 282 LBS | SYSTOLIC BLOOD PRESSURE: 111 MMHG | BODY MASS INDEX: 45.32 KG/M2 | DIASTOLIC BLOOD PRESSURE: 81 MMHG

## 2017-10-23 DIAGNOSIS — Z96.651 STATUS POST TOTAL RIGHT KNEE REPLACEMENT: ICD-10-CM

## 2017-10-23 DIAGNOSIS — M25.561 ACUTE PAIN OF RIGHT KNEE: ICD-10-CM

## 2017-10-23 DIAGNOSIS — Z96.651 STATUS POST TOTAL RIGHT KNEE REPLACEMENT: Primary | ICD-10-CM

## 2017-10-23 PROCEDURE — 99024 POSTOP FOLLOW-UP VISIT: CPT | Mod: S$GLB,,, | Performed by: ORTHOPAEDIC SURGERY

## 2017-10-23 PROCEDURE — 99999 PR PBB SHADOW E&M-EST. PATIENT-LVL III: CPT | Mod: PBBFAC,,, | Performed by: ORTHOPAEDIC SURGERY

## 2017-10-23 PROCEDURE — 73564 X-RAY EXAM KNEE 4 OR MORE: CPT | Mod: 26,50,, | Performed by: RADIOLOGY

## 2017-10-23 PROCEDURE — 73564 X-RAY EXAM KNEE 4 OR MORE: CPT | Mod: TC,50,PO

## 2017-10-23 NOTE — PROGRESS NOTES
Subjective:          Chief Complaint: Camila Black is a 47 y.o. female who had concerns including Post-op Evaluation of the Right Knee.    Patient is here for a follow up for her right knee. She has been going to PT 2x a week with Rosmery Spencer. She is doing very well.     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  Replacement, Knee, Medial and Lateral compartment (Total Knee) 87600, Complex  2.  Medial Collateral Ligament Reconstruction, (Extra-articular), 88091  Complexity of the case was increased due to the patient's BMI / body morphology, chronic nature of the problem and the degree of preoperative genu valgum noted.         Pain   Associated symptoms include joint swelling. Pertinent negatives include no abdominal pain, chest pain, chills, congestion, coughing, fever, headaches, myalgias, nausea, numbness, rash, sore throat or vomiting.          Review of Systems   Constitution: Negative. Negative for chills, fever, weight gain and weight loss.   HENT: Negative for congestion and sore throat.    Eyes: Negative for blurred vision and double vision.   Cardiovascular: Negative for chest pain, leg swelling and palpitations.   Respiratory: Negative for cough and shortness of breath.    Skin: Negative for itching, poor wound healing and rash.   Musculoskeletal: Positive for joint pain, joint swelling, muscle weakness and stiffness. Negative for back pain and myalgias.   Gastrointestinal: Negative for abdominal pain, constipation, diarrhea, nausea and vomiting.   Genitourinary:  Negative.  Negative for frequency and hematuria.   Neurological: Negative for dizziness, headaches, numbness, paresthesias and sensory change.   Psychiatric/Behavioral: Negative for altered mental status and depression. The patient is not nervous/anxious.    Allergic/Immunologic: Negative for hives.       Pain Related Questions  Over the past 3 days, what was your average pain during activity? (I.e. running, jogging, walking, climbing stairs, getting dressed, ect.): 0  Over the past 3 days, what was your highest pain level?: 0  Over the past 3 days, what was your lowest pain level? : 0    Other  How many nights a week are you awakened by your affected body part?: 0  Was the patient's HEIGHT measured or patient reported?: Patient Reported  Was the patient's WEIGHT measured or patient reported?: Measured      Objective:        General: Camila is well-developed, well-nourished, appears stated age, in no acute distress, alert and oriented to time, place and person.     General    Vitals reviewed.  Constitutional: She is oriented to person, place, and time. She appears well-developed and well-nourished. No distress.   HENT:   Head: Normocephalic and atraumatic.   Mouth/Throat: No oropharyngeal exudate.   Eyes: EOM are normal. Right eye exhibits no discharge. Left eye exhibits no discharge.   Neck: Normal range of motion.   Cardiovascular: Normal rate and regular rhythm.    Pulmonary/Chest: Effort normal and breath sounds normal. No respiratory distress.   Neurological: She is alert and oriented to person, place, and time. She has normal reflexes. No cranial nerve deficit. Coordination normal.   Psychiatric: She has a normal mood and affect. Her behavior is normal. Judgment and thought content normal.     General Musculoskeletal Exam   Gait: antalgic       Right Knee Exam     Inspection   Erythema: absent  Scars: present  Swelling: absent  Effusion: effusion  Deformity: deformity  Bruising: absent    Tenderness   Right  knee tenderness location: anterior knee pain.    Range of Motion   Extension: -20   Flexion:  100 abnormal     Tests   Meniscus   Pedro:  Medial - negative Lateral - negative  Ligament Examination Lachman: normal (-1 to 2mm) PCL-Posterior Drawer: normal (0 to 2mm)     MCL - Valgus: normal (0 to 2mm)  LCL - Varus: normalPivot Shift: normal (Equal)Reverse Pivot Shift: normal (Equal)Dial Test at 30 degrees: normal (< 5 degrees)Dial Test at 90 degrees: normal (< 5 degrees)  Posterior Sag Test: negative  Posterolateral Corner: unstable (>15 degrees difference)  Patella   Patellar Apprehension: negative  Passive Patellar Tilt: neutral  Patellar Tracking: normal  Patellar Glide (quadrants): Lateral - 1   Medial - 2  Q-Angle at 90 degrees: normal  Patellar Grind: negative  J-Sign: none    Other   Meniscal Cyst: absent  Popliteal (Baker's) Cyst: absent  Sensation: normal    Left Knee Exam     Inspection   Erythema: absent  Scars: absent  Swelling: absent  Effusion: absent  Deformity: deformity  Bruising: absent    Tenderness   The patient is experiencing no tenderness.         Range of Motion   Extension: -20   Left knee flexion: 105.     Tests   Meniscus   Pedro:  Medial - negative Lateral - negative  Stability Lachman: normal (-1 to 2mm) PCL-Posterior Drawer: normal (0 to 2mm)  MCL - Valgus: normal (0 to 2mm)  LCL - Varus: normal (0 to 2mm)Pivot Shift: normal (Equal)Reverse Pivot Shift: normal (Equal)Dial Test at 30 degrees: normal (< 5 degrees)Dial Test at 90 degrees: normal (< 5 degrees)  Posterior Sag Test: negative  Posterolateral Corner: unstable (>15 degrees difference)  Patella   Patellar Apprehension: negative  Passive Patellar Tilt: neutral  Patellar Tracking: normal  Patellar Glide (Quadrants): Lateral - 1 Medial - 2  Q-Angle at 90 degrees: normal  Patellar Grind: positive  J-Sign: J sign absent    Other   Meniscal Cyst: absent  Popliteal (Baker's) Cyst: absent  Sensation: normal    Right Hip Exam     Tests    Venkata: negative  Left Hip Exam     Tests   Venkata: negative          Muscle Strength   Right Lower Extremity   Hip Abduction: 4/5   Quadriceps:  4/5   Hamstring:  3/5   Left Lower Extremity   Hip Abduction: 4/5   Quadriceps:  4/5   Hamstring:  3/5     Reflexes     Left Side  Quadriceps:  2+  Achilles:  2+    Right Side   Quadriceps:  2+  Achilles:  2+    Vascular Exam     Right Pulses  Dorsalis Pedis:      2+  Posterior Tibial:      2+        Left Pulses  Dorsalis Pedis:      2+  Posterior Tibial:      2+        Edema  Right Lower Leg: absent  Left Lower Leg: absent    RADIOGRAPHS TODAY  Radiographs ordered and reviewed today in clinic of the bilateral knee demonstrates right TKA with hardware in good position with medial screws for MCL reconstruction, medial and patellofemoral compartment wear on the left knee.                 Assessment:       Encounter Diagnoses   Name Primary?    Status post total right knee replacement Yes    Acute pain of right knee           Plan:       1. IKDC, SF-12 and KOOS was filled out today in clinic.     RTC in 3 months with Dr. Grace Irvin Patient will fill out IKDC, SF-12 and KOOS and bilateral knee series on return.    2. Continue PT per protocol - new referral placed today    3. Return to work letter provided to return next week                                            Patient questionnaires may have been collected.

## 2017-10-23 NOTE — LETTER
Patient: Camila Black   YOB: 1970   Clinic Number: [id number]   Today's Date: October 23, 2017        Certificate to Return to Work     Camila Camila was seen by Grace Irvin MD on 10/23/2017.    Camila Villalta can return to work on 10/30/2017 with full duty.    Specific restrictions: continue PT twice a week    If you have any questions or concerns, please feel free to contact the office at [phone].    Thank you.    Grace Irvin MD    Signature:

## 2017-10-24 ENCOUNTER — CLINICAL SUPPORT (OUTPATIENT)
Dept: REHABILITATION | Facility: HOSPITAL | Age: 47
End: 2017-10-24
Attending: ORTHOPAEDIC SURGERY
Payer: COMMERCIAL

## 2017-10-24 DIAGNOSIS — Z96.651 STATUS POST TOTAL RIGHT KNEE REPLACEMENT: ICD-10-CM

## 2017-10-24 DIAGNOSIS — M25.561 ACUTE PAIN OF RIGHT KNEE: ICD-10-CM

## 2017-10-24 PROCEDURE — 97110 THERAPEUTIC EXERCISES: CPT

## 2017-10-24 NOTE — PROGRESS NOTES
"                                                  Physical Therapy Daily Note     Date: 10/24/2017  Name: Camila Black  Clinic Number: [id number]  Diagnosis:   Encounter Diagnoses   Name Primary?    Acute pain of right knee     Status post total right knee replacement      Physician: Grace Irvin MD    Evaluation Date: 8/17/17  Date of Surgery: 7/28/17  Visit #: 15  Start Time:  1100  Stop Time:  1205  Total Treatment Time: 65    Precautions: standard, s/p R TKA with MCL reconstruction      Subjective     Pt reports she is feeling okay today.  Pain: 0/10    Objective     Measurements taken: (10/24/17)  R knee:  Flex: 105 deg (prone)  Ext: 0 deg     Patient received group therapy to increase strength, endurance, ROM and flexibility with activities as follows:     Camila received therapeutic exercises to develop strength, endurance, ROM, flexibility and core stabilization for 55 minutes including:   Stat bike in available ROM x10 min  Heel taps 4" x30  Heel slides x30-np  3 way hip 3# x30 R  HS curl 3# x30 R-np  Leg press 160# dbl leg x30  Heel raise on leg press 140# x30  U leg press R 100# x30  LAQ with ball squeeze x30 3#  Clamshells otb x30 B  Lateral walking x1 lap ytb  Prone with strap 10 sec x10 knee flex  HSS x2 min   GSS x2 min     Treatment ended with ice x10 min      Written Home Exercises Provided: provided at initial eval  Pt demo good understanding of the education provided. Camila demonstrated good return demonstration of activities.     Education provided:  Camila verbalized good understanding of education provided.   No spiritual or educational barriers to learning identified.    Assessment   Patient continues to show hip weakness B and requires cuing to avoid compensations. She is demonstrating good benefit from treatment. Patient would continue to benefit from PT intervention to progress toward functional goals.  This is a 47 y.o. female referred to outpatient physical therapy " and presents with a medical diagnosis of s/p R TKA with MCL reconstruction and demonstrates limitations as described in the problem list Pt prognosis is Good. Pt will continue to benefit from skilled outpatient physical therapy to address the deficits listed below in the problem list, provide pt/family education and to maximize pt's level of independence in the home and community environment.    Will the patient continue to benefit from skilled PT intervention? yes        Medical necessity is demonstrated by:   - Pain limits function of effected part for all activities  - Unable to participate in daily activities     Progress towards goals: good    New/Revised Goals:none this visit       Plan   Continue with established Plan of Care towards PT goals.      Therapist: Rosmery Spencer, PT, DPT

## 2017-11-03 ENCOUNTER — PATIENT OUTREACH (OUTPATIENT)
Dept: OTHER | Facility: OTHER | Age: 47
End: 2017-11-03

## 2017-11-03 NOTE — PROGRESS NOTES
Last 5 Patient Entered Readings Current 30 Day Average: 117/88     Recent Readings 11/10/2017 10/31/2017 10/20/2017 10/18/2017 10/12/2017    Systolic BP (mmHg) 127 107 115 117 104    Diastolic BP (mmHg) 92 84 57 69 68    Pulse 81 63 66 69 68        Hypertension Digital Medicine Program (HDMP): Health  Follow Up    Lifestyle Modifications:    1.Low sodium diet: yes Patient denies any changes to her diet.     2.Physical activity: yes Ms. Black is still attending physical therapy. She reports that she has 4 weeks left. She is feeling better and getting around more.    3.Hypotension/Hypertension symptoms: no   Frequency/Alleviating factors/Precipitating factors, etc.     4.Patient has been compliant with the medication regimen.     Follow up with Ms. Camila Black completed. Ms. Black is doing well. She will submit a BP reading today. No further questions or concerns. I will follow up in a few weeks to assess progress.

## 2017-11-08 ENCOUNTER — CLINICAL SUPPORT (OUTPATIENT)
Dept: REHABILITATION | Facility: HOSPITAL | Age: 47
End: 2017-11-08
Attending: ORTHOPAEDIC SURGERY
Payer: COMMERCIAL

## 2017-11-08 DIAGNOSIS — Z96.651 STATUS POST TOTAL RIGHT KNEE REPLACEMENT: ICD-10-CM

## 2017-11-08 DIAGNOSIS — M25.561 ACUTE PAIN OF RIGHT KNEE: ICD-10-CM

## 2017-11-08 PROCEDURE — 97110 THERAPEUTIC EXERCISES: CPT

## 2017-11-08 NOTE — PROGRESS NOTES
"                                                  Physical Therapy Daily Note     Date: 11/08/2017  Name: Camila Black  Clinic Number: [id number]  Diagnosis:   Encounter Diagnoses   Name Primary?    Acute pain of right knee     Status post total right knee replacement      Physician: Grace Irvin MD    Evaluation Date: 8/17/17  Date of Surgery: 7/28/17  Visit #: 16  Start Time:  400  Stop Time:  510  Total Treatment Time: 70    Precautions: standard, s/p R TKA with MCL reconstruction      Subjective     Pt reports she is feeling good today and states she returned to work.  Pain: 0/10    Objective     Measurements taken: (11/8/17)  R knee:  Flex: 100 deg (prone)  Ext: 0 deg     Patient received group therapy to increase strength, endurance, ROM and flexibility with activities as follows:     Camila received therapeutic exercises to develop strength, endurance, ROM, flexibility and core stabilization for 55 minutes including:   Stat bike in available ROM x10 min  Heel taps 5" x30  3 way hip 3# x30 R  HS curl 3# x30 R  Leg press 160# dbl leg x30  Heel raise on leg press 140# x30  U leg press R 100# x30  LAQ with ball squeeze x30 3#  Clamshells otb x30 B  Lateral walking x1 lap ytb  Prone with strap x2 min knee flex  HSS x2 min -np  GSS x2 min -np     Treatment ended with ice x10 min      Written Home Exercises Provided: provided at initial eval  Pt demo good understanding of the education provided. Camila demonstrated good return demonstration of activities.     Education provided:  Camila verbalized good understanding of education provided.   No spiritual or educational barriers to learning identified.    Assessment   Patient demonstrated decreased knee flex this visit secondary to not perform HEP while out.  She is demonstrating good benefit from treatment. Patient would continue to benefit from PT intervention to progress toward functional goals.  This is a 47 y.o. female referred to outpatient " physical therapy and presents with a medical diagnosis of s/p R TKA with MCL reconstruction and demonstrates limitations as described in the problem list Pt prognosis is Good. Pt will continue to benefit from skilled outpatient physical therapy to address the deficits listed below in the problem list, provide pt/family education and to maximize pt's level of independence in the home and community environment.    Will the patient continue to benefit from skilled PT intervention? yes        Medical necessity is demonstrated by:   - Pain limits function of effected part for all activities  - Unable to participate in daily activities     Progress towards goals: good    New/Revised Goals:none this visit       Plan   Continue with established Plan of Care towards PT goals.      Therapist: Rosmery Spencer, PT, DPT

## 2017-11-14 ENCOUNTER — CLINICAL SUPPORT (OUTPATIENT)
Dept: REHABILITATION | Facility: HOSPITAL | Age: 47
End: 2017-11-14
Attending: ORTHOPAEDIC SURGERY
Payer: COMMERCIAL

## 2017-11-14 DIAGNOSIS — M25.561 ACUTE PAIN OF RIGHT KNEE: Primary | ICD-10-CM

## 2017-11-14 PROCEDURE — 97110 THERAPEUTIC EXERCISES: CPT

## 2017-11-14 NOTE — PROGRESS NOTES
"                                                  Physical Therapy Daily Note     Date: 11/14/2017  Name: Camila Black  Clinic Number: [id number]  Diagnosis:   Encounter Diagnosis   Name Primary?    Acute pain of right knee Yes     Physician: Grace Irvin MD    Evaluation Date: 8/17/17  Date of Surgery: 7/28/17  Visit #: 17  Start Time:  1100  Stop Time:  1200  Total Treatment Time: 60    Precautions: standard, s/p R TKA with MCL reconstruction      Subjective     Pt reports she is feeling good today and is w/o c/o pn.  Pain: 0/10    Objective     Measurements taken: (11/8/17)  R knee:  Flex: 100 deg (prone)  Ext: 0 deg     Patient received group therapy to increase strength, endurance, ROM and flexibility with activities as follows:     Camila received therapeutic exercises to develop strength, endurance, ROM, flexibility and core stabilization for 55 minutes including:   Stat bike in available ROM x10 min  Heel taps 5" x30  3 way hip 3# x30 R  HS curl 3# x30 R  Leg press 160# dbl leg x30  Heel raise on leg press 160# x30  U leg press R 100# x30  LAQ with ball squeeze x30 3#  Clamshells otb x30 B  Lateral walking x1 lap otb  Prone with strap x2 min knee flex  prone hang 4 min 4#  HSS x2 min -np  GSS x2 min            Written Home Exercises Provided: provided at initial eval  Pt demo good understanding of the education provided. Camila demonstrated good return demonstration of activities.     Education provided:  Camila verbalized good understanding of education provided.   No spiritual or educational barriers to learning identified.    Assessment   Pt continues to lasha tx well.  She is demonstrating good benefit from treatment. Patient would continue to benefit from PT intervention to progress toward functional goals.  This is a 47 y.o. female referred to outpatient physical therapy and presents with a medical diagnosis of s/p R TKA with MCL reconstruction and demonstrates limitations as described " in the problem list Pt prognosis is Good. Pt will continue to benefit from skilled outpatient physical therapy to address the deficits listed below in the problem list, provide pt/family education and to maximize pt's level of independence in the home and community environment.    Will the patient continue to benefit from skilled PT intervention? yes        Medical necessity is demonstrated by:   - Pain limits function of effected part for all activities  - Unable to participate in daily activities     Progress towards goals: good    New/Revised Goals:none this visit       Plan   Continue with established Plan of Care towards PT goals.      Therapist: Crescencio Calabrese PTA,

## 2017-11-20 DIAGNOSIS — M25.561 ACUTE PAIN OF RIGHT KNEE: ICD-10-CM

## 2017-11-20 DIAGNOSIS — N39.41 URGE INCONTINENCE: ICD-10-CM

## 2017-11-20 DIAGNOSIS — R39.15 URINARY URGENCY: ICD-10-CM

## 2017-11-20 RX ORDER — OXYBUTYNIN CHLORIDE 15 MG/1
15 TABLET, EXTENDED RELEASE ORAL DAILY
Qty: 90 TABLET | Refills: 3 | Status: SHIPPED | OUTPATIENT
Start: 2017-11-20 | End: 2020-03-16

## 2017-11-20 RX ORDER — CELECOXIB 200 MG/1
200 CAPSULE ORAL 2 TIMES DAILY
Qty: 60 CAPSULE | Refills: 2 | Status: SHIPPED | OUTPATIENT
Start: 2017-11-20 | End: 2018-02-23 | Stop reason: SDUPTHER

## 2017-11-20 RX ORDER — VIT C/E/ZN/COPPR/LUTEIN/ZEAXAN 250MG-90MG
1000 CAPSULE ORAL DAILY
Qty: 30 CAPSULE | Refills: 12 | Status: SHIPPED | OUTPATIENT
Start: 2017-11-20 | End: 2018-09-26 | Stop reason: SDUPTHER

## 2017-12-05 ENCOUNTER — PATIENT MESSAGE (OUTPATIENT)
Dept: SPORTS MEDICINE | Facility: CLINIC | Age: 47
End: 2017-12-05

## 2017-12-15 ENCOUNTER — CLINICAL SUPPORT (OUTPATIENT)
Dept: REHABILITATION | Facility: HOSPITAL | Age: 47
End: 2017-12-15
Attending: ORTHOPAEDIC SURGERY
Payer: COMMERCIAL

## 2017-12-15 DIAGNOSIS — Z96.651 STATUS POST TOTAL RIGHT KNEE REPLACEMENT: ICD-10-CM

## 2017-12-15 DIAGNOSIS — M25.561 ACUTE PAIN OF RIGHT KNEE: ICD-10-CM

## 2017-12-15 PROCEDURE — 97110 THERAPEUTIC EXERCISES: CPT

## 2017-12-15 NOTE — PROGRESS NOTES
"                                                  Physical Therapy Daily Note     Date: 12/15/2017  Name: Camila Black  Clinic Number: [id number]  Diagnosis:   Encounter Diagnoses   Name Primary?    Acute pain of right knee     Status post total right knee replacement      Physician: Grace Irvin MD    Evaluation Date: 8/17/17  Date of Surgery: 7/28/17  Visit #: 17  Start Time:  1100  Stop Time:  1200  Total Treatment Time: 60    Precautions: standard, s/p R TKA with MCL reconstruction      Subjective     Pt reports she is feeling good today and is w/o c/o pn.  Pain: 0/10    Objective     Measurements taken: (11/8/17)  R knee:  Flex: 105 deg   Ext: 0 deg     Patient received group therapy to increase strength, endurance, ROM and flexibility with activities as follows:     Camila received therapeutic exercises to develop strength, endurance, ROM, flexibility and core stabilization for 50 minutes including:   Stat bike in available ROM x10 min  Heel slides x30  Heel taps 5" x30-np  3 way hip 3# x30 R-np  HS curl 3# x30 R-np  Leg press 140# dbl leg x30  Heel raise on leg press 160# x30  U leg press R 100# x30  LAQ with ball squeeze x30 3#-np  Clamshells gtb x30 B  Lateral walking x1 lap otb  Prone with strap x2 min knee flex  prone hang 4 min 4#-np  HSS x2 min -np  GSS x2 min -np           Written Home Exercises Provided: provided at initial eval  Pt demo good understanding of the education provided. Camila demonstrated good return demonstration of activities.     Education provided:  Camila verbalized good understanding of education provided.   No spiritual or educational barriers to learning identified.    Assessment   Pt shows good ROM with multiple attempts. Patient showing good functional strength. Patient would continue to benefit from PT intervention to progress toward functional goals.  This is a 47 y.o. female referred to outpatient physical therapy and presents with a medical diagnosis of " s/p R TKA with MCL reconstruction and demonstrates limitations as described in the problem list Pt prognosis is Good. Pt will continue to benefit from skilled outpatient physical therapy to address the deficits listed below in the problem list, provide pt/family education and to maximize pt's level of independence in the home and community environment.    Will the patient continue to benefit from skilled PT intervention? yes        Medical necessity is demonstrated by:   - Pain limits function of effected part for all activities  - Unable to participate in daily activities     Progress towards goals: good    New/Revised Goals:none this visit       Plan   Continue with established Plan of Care towards PT goals.      Therapist: Rosmery Spencer, PT, DPT

## 2017-12-18 ENCOUNTER — PATIENT OUTREACH (OUTPATIENT)
Dept: OTHER | Facility: OTHER | Age: 47
End: 2017-12-18

## 2017-12-18 NOTE — PROGRESS NOTES
Last 5 Patient Entered Readings Current 30 Day Average: 123/82       Units 11/27/2017 11/10/2017 10/31/2017 10/20/2017 10/18/2017    Time -  5:37 PM  6:07 AM  5:18 AM  7:42 AM  7:41 AM    Systolic Blood Pressure - 123 127 107 115 117    Diastolic Blood Pressure - 82 92 84 57 69    Pulse bpm 81 81 63 66 69        Messaging patient regarding lack of readings.

## 2017-12-19 ENCOUNTER — CLINICAL SUPPORT (OUTPATIENT)
Dept: REHABILITATION | Facility: HOSPITAL | Age: 47
End: 2017-12-19
Attending: ORTHOPAEDIC SURGERY
Payer: COMMERCIAL

## 2017-12-19 DIAGNOSIS — M25.561 ACUTE PAIN OF RIGHT KNEE: ICD-10-CM

## 2017-12-19 DIAGNOSIS — Z96.651 STATUS POST TOTAL RIGHT KNEE REPLACEMENT: ICD-10-CM

## 2017-12-19 PROCEDURE — 97110 THERAPEUTIC EXERCISES: CPT

## 2017-12-19 NOTE — PROGRESS NOTES
"                                                  Physical Therapy Daily Note     Date: 12/19/2017  Name: Camila Black  Clinic Number: [id number]  Diagnosis:   Encounter Diagnoses   Name Primary?    Acute pain of right knee     Status post total right knee replacement      Physician: Grace Irvin MD    Evaluation Date: 8/17/17  Date of Surgery: 7/28/17  Visit #: 19  Start Time:  400  Stop Time:  450  Total Treatment Time: 50    Precautions: standard, s/p R TKA with MCL reconstruction      Subjective     Pt reports she is feeling good today and has been working on his flexion.  Pain: 0/10    Objective     Measurements taken: (12/19/17)  R knee:  Flex: 105 deg   Ext: 0 deg     Patient received group therapy to increase strength, endurance, ROM and flexibility with activities as follows:     Camila received therapeutic exercises to develop strength, endurance, ROM, flexibility and core stabilization for 50 minutes including:   Stat bike in available ROM x10 min  Heel slides x30  Heel taps 5" x30  3 way hip 3# x30 R-np  HS curl 3# x30 R-np  Leg press 160# dbl leg x30  Heel raise on leg press 160# x30  U leg press R 120# x30  LAQ with ball squeeze x30 3#-np  Clamshells gtb x30 B  Lateral walking x1 lap gtb  Prone with strap 2x2 min knee flex  prone hang 4 min 4#-np  HSS x2 min -np  GSS x2 min -np           Written Home Exercises Provided: provided at initial eval  Pt demo good understanding of the education provided. Camila demonstrated good return demonstration of activities.     Education provided:  Camila verbalized good understanding of education provided.   No spiritual or educational barriers to learning identified.    Assessment   Pt showed good ROM this visit initially which is significant improvement.  Patient showing good functional strength return. Patient would continue to benefit from PT intervention to progress toward functional goals.  This is a 47 y.o. female referred to outpatient " physical therapy and presents with a medical diagnosis of s/p R TKA with MCL reconstruction and demonstrates limitations as described in the problem list Pt prognosis is Good. Pt will continue to benefit from skilled outpatient physical therapy to address the deficits listed below in the problem list, provide pt/family education and to maximize pt's level of independence in the home and community environment.    Will the patient continue to benefit from skilled PT intervention? yes        Medical necessity is demonstrated by:   - Pain limits function of effected part for all activities  - Unable to participate in daily activities     Progress towards goals: good    New/Revised Goals:none this visit       Plan   Continue with established Plan of Care towards PT goals.      Therapist: Rosmery Spencer, PT, DPT

## 2017-12-21 ENCOUNTER — PATIENT OUTREACH (OUTPATIENT)
Dept: OTHER | Facility: OTHER | Age: 47
End: 2017-12-21

## 2017-12-21 ENCOUNTER — CLINICAL SUPPORT (OUTPATIENT)
Dept: REHABILITATION | Facility: HOSPITAL | Age: 47
End: 2017-12-21
Attending: ORTHOPAEDIC SURGERY
Payer: COMMERCIAL

## 2017-12-21 DIAGNOSIS — M25.561 ACUTE PAIN OF RIGHT KNEE: ICD-10-CM

## 2017-12-21 DIAGNOSIS — Z96.651 STATUS POST TOTAL RIGHT KNEE REPLACEMENT: ICD-10-CM

## 2017-12-21 PROCEDURE — 97110 THERAPEUTIC EXERCISES: CPT

## 2017-12-21 NOTE — PROGRESS NOTES
"                                                  Physical Therapy Daily Note     Date: 12/21/2017  Name: Camila Black  Clinic Number: [id number]  Diagnosis:   Encounter Diagnoses   Name Primary?    Acute pain of right knee     Status post total right knee replacement      Physician: Grace Irvin MD    Evaluation Date: 8/17/17  Date of Surgery: 7/28/17  Visit #: 20  Start Time:  410  Stop Time: 435  Total Treatment Time: 25    Precautions: standard, s/p R TKA with MCL reconstruction      Subjective     Pt reports she is not feeling well today and would like to shorten treatment today.  Pain: 0/10    Objective     Measurements taken: (12/19/17)  R knee:  Flex: 105 deg   Ext: 0 deg     Patient received group therapy to increase strength, endurance, ROM and flexibility with activities as follows:     Camila received therapeutic exercises to develop strength, endurance, ROM, flexibility and core stabilization for 25 minutes including:   Stat bike in available ROM x10 min  Heel slides x30  Prone with strap 2x2 min knee flex    NP today  Heel taps 5" x30  3 way hip 3# x30 R-np  HS curl 3# x30 R-np  Leg press 160# dbl leg x30  Heel raise on leg press 160# x30  U leg press R 120# x30  LAQ with ball squeeze x30 3#-np  Clamshells gtb x30 B  Lateral walking x1 lap gtb    prone hang 4 min 4#-np  HSS x2 min -np  GSS x2 min -np           Written Home Exercises Provided: provided at initial eval  Pt demo good understanding of the education provided. Camila demonstrated good return demonstration of activities.     Education provided:  Camila verbalized good understanding of education provided.   No spiritual or educational barriers to learning identified.    Assessment   Pt has maintained knee flexion ROM gains since last visit and was re-educated to continue stretching at home. Patient would continue to benefit from PT intervention to progress toward functional goals.  This is a 47 y.o. female referred to " outpatient physical therapy and presents with a medical diagnosis of s/p R TKA with MCL reconstruction and demonstrates limitations as described in the problem list Pt prognosis is Good. Pt will continue to benefit from skilled outpatient physical therapy to address the deficits listed below in the problem list, provide pt/family education and to maximize pt's level of independence in the home and community environment.    Will the patient continue to benefit from skilled PT intervention? yes        Medical necessity is demonstrated by:   - Pain limits function of effected part for all activities  - Unable to participate in daily activities     Progress towards goals: good    New/Revised Goals:none this visit       Plan   Continue with established Plan of Care towards PT goals.      Therapist: Rosmery Spencer, PT, DPT

## 2017-12-21 NOTE — PROGRESS NOTES
"Last 5 Patient Entered Readings                                      Current 30 Day Average: 110/73     Recent Readings 1/16/2018 1/8/2018 12/30/2017 11/27/2017 11/10/2017    SBP (mmHg) 106 103 122 123 127    DBP (mmHg) 71 67 80 82 92    Pulse 62 73 74 81 81        Hypertension Digital Medicine Program (HDMP): Health  Follow Up    Lifestyle Modifications:    1.Low sodium diet: yes Patient reports that she is continuing to limit her salt intake.    2.Physical activity: no Ms. Black states that he knee is feeling much better and she is getting around much easier.    3.Hypotension/Hypertension symptoms: no   Frequency/Alleviating factors/Precipitating factors, etc.     4.Patient has been compliant with the medication regimen.     Follow up with Ms. Camila Black completed. Ms. Black is doing well. Patient states that she is "doing really well" in terms of diet and physical activity. No further questions or concerns. I will follow up in a few weeks to assess progress.             "

## 2018-01-04 ENCOUNTER — CLINICAL SUPPORT (OUTPATIENT)
Dept: REHABILITATION | Facility: HOSPITAL | Age: 48
End: 2018-01-04
Attending: ORTHOPAEDIC SURGERY
Payer: COMMERCIAL

## 2018-01-04 DIAGNOSIS — M25.561 ACUTE PAIN OF RIGHT KNEE: ICD-10-CM

## 2018-01-04 DIAGNOSIS — Z96.651 STATUS POST TOTAL RIGHT KNEE REPLACEMENT: ICD-10-CM

## 2018-01-04 PROCEDURE — 97110 THERAPEUTIC EXERCISES: CPT

## 2018-01-04 NOTE — PROGRESS NOTES
"                                                  Physical Therapy Daily Note     Date: 01/04/2018  Name: Camila Black  Clinic Number: [id number]  Diagnosis:   Encounter Diagnoses   Name Primary?    Acute pain of right knee     Status post total right knee replacement      Physician: Grace Irvin MD    Evaluation Date: 8/17/17  Date of Surgery: 7/28/17  Visit #: 21  Start Time:  410  Stop Time: 500  Total Treatment Time: 50    Precautions: standard, s/p R TKA with MCL reconstruction      Subjective     Pt reports she is doing well and her bending is still her biggest limitation.  Pain: 0/10    Objective     Measurements taken: (1/4/17)  R knee:  Flex: 110 deg   Ext: 0 deg     Patient received group therapy to increase strength, endurance, ROM and flexibility with activities as follows:     Camila received therapeutic exercises to develop strength, endurance, ROM, flexibility and core stabilization for 45 minutes including:   Stat bike in available ROM x10 min  Heel slides x30  Prone with strap 2x2 min knee flex  Heel taps 5" x30-np  3 way hip 3# x30 R-np  HS curl 3# x30 R-np  Leg press 180# dbl leg x30  Heel raise on leg press 180# x30  U leg press R 100# x30  Clamshells gtb x30 B  Lateral walking x1 lap gtb  HSS x2 min -np  GSS x2 min -np           Written Home Exercises Provided: provided at initial eval  Pt demo good understanding of the education provided. Camila demonstrated good return demonstration of activities.     Education provided:  Camila verbalized good understanding of education provided.   No spiritual or educational barriers to learning identified.    Assessment   Pt continues to show improvement in quad strength, and was able to demonstrate slight increased knee ROM. Patient would continue to benefit from PT intervention to progress toward functional goals.  This is a 47 y.o. female referred to outpatient physical therapy and presents with a medical diagnosis of s/p R TKA with " MCL reconstruction and demonstrates limitations as described in the problem list Pt prognosis is Good. Pt will continue to benefit from skilled outpatient physical therapy to address the deficits listed below in the problem list, provide pt/family education and to maximize pt's level of independence in the home and community environment.    Will the patient continue to benefit from skilled PT intervention? yes        Medical necessity is demonstrated by:   - Pain limits function of effected part for all activities  - Unable to participate in daily activities     Progress towards goals: good    New/Revised Goals:none this visit       Plan   Continue with established Plan of Care towards PT goals.      Therapist: Rosmery Spencer, PT, DPT

## 2018-01-05 NOTE — PROGRESS NOTES
Last 5 Patient Entered Readings Current 30 Day Average: 122/80     Recent Readings 12/30/2017 11/27/2017 11/10/2017 10/31/2017 10/20/2017    SBP (mmHg) 122 123 127 107 115    DBP (mmHg) 80 82 92 84 57    Pulse 74 81 81 63 66        Current HTN medications: none    Assessment/ Plan:   Called patient to follow up. Per newly released 2017 ACC/ AHA HTN guidelines (goal of BP < 130/80), current 30-day average is well controlled. Requested patient take at least weekly readings.  Patient denies having questions or concerns. Instructed patient to call if she has any questions or concerns, patient confirms understanding.   Will continue to monitor. WCB in 3 months, sooner if BP begins to trend up or down.

## 2018-01-09 ENCOUNTER — CLINICAL SUPPORT (OUTPATIENT)
Dept: REHABILITATION | Facility: HOSPITAL | Age: 48
End: 2018-01-09
Attending: ORTHOPAEDIC SURGERY
Payer: COMMERCIAL

## 2018-01-09 DIAGNOSIS — M25.561 ACUTE PAIN OF RIGHT KNEE: ICD-10-CM

## 2018-01-09 DIAGNOSIS — Z96.651 STATUS POST TOTAL RIGHT KNEE REPLACEMENT: ICD-10-CM

## 2018-01-09 PROCEDURE — 97110 THERAPEUTIC EXERCISES: CPT

## 2018-01-09 NOTE — PROGRESS NOTES
"                                                  Physical Therapy Daily Note     Date: 01/09/2018  Name: Camila Black  Clinic Number: [id number]  Diagnosis:   No diagnosis found.  Physician: Nany Irvin MD    Evaluation Date: 8/17/17  Date of Surgery: 7/28/17  Visit #: 21  Start Time:  310  Stop Time: 400  Total Treatment Time: 50    Precautions: standard, s/p R TKA with MCL reconstruction      Subjective     Pt reports no pain or soreness upon arrival, with usual level of R knee stiffness.  Pain: 0/10    Objective     Measurements taken: (1/9/18)  R knee:  Flex: 100 deg (110 deg on 1/4/18)  Ext: 0 deg (from 1/4/18)    Patient received group therapy to increase strength, endurance, ROM and flexibility with activities as follows:     Camila received therapeutic exercises to develop strength, endurance, ROM, flexibility and core stabilization for 45 minutes including:   Stat bike in available ROM x10 min  Heel slides x30  Prone with strap 2x2 min knee flex  Heel taps 5" x30-np  3 way hip 3# x30 R-np  HS curl 3# x30 R-np  Leg press 180# dbl leg x30  Heel raise on leg press 180# x30  U leg press R 100# x30  Clamshells gtb 2x20 B  Single leg bridge 2x10 B  Lateral walking x1 lap gtb  HSS x2 min -np  GSS x2 min -np      Written Home Exercises Provided: provided at initial eval  Pt demo good understanding of the education provided. Camila demonstrated good return demonstration of activities.     Education provided:  Camila verbalized good understanding of education provided.   No spiritual or educational barriers to learning identified.    Assessment   Pt continues to show improvements in hip/knee strength despite variable knee flexion ROM measurements in recent sessions. Pt will benefit from continued skilled PT intervention to progress toward functional goals.  This is a 47 y.o. female referred to outpatient physical therapy and presents with a medical diagnosis of s/p R TKA with MCL reconstruction and " demonstrates limitations as described in the problem list Pt prognosis is Good. Pt will continue to benefit from skilled outpatient physical therapy to address the deficits listed below in the problem list, provide pt/family education and to maximize pt's level of independence in the home and community environment.    Will the patient continue to benefit from skilled PT intervention? yes        Medical necessity is demonstrated by:   - Pain limits function of effected part for all activities  - Unable to participate in daily activities     Progress towards goals: good    New/Revised Goals:none this visit       Plan   Continue with established Plan of Care towards PT goals.      Therapist: Kendall Quiles, SPT     I certify that I was present in the room directing the student in service delivery and guiding them using my skilled judgement. As the co-signing therapist, I have reviewed the students documentation and am responsible for the treatment, assessment, and plan.    Rosmery Spencer, PT, DPT

## 2018-01-11 ENCOUNTER — CLINICAL SUPPORT (OUTPATIENT)
Dept: REHABILITATION | Facility: HOSPITAL | Age: 48
End: 2018-01-11
Attending: ORTHOPAEDIC SURGERY
Payer: COMMERCIAL

## 2018-01-11 DIAGNOSIS — M25.561 ACUTE PAIN OF RIGHT KNEE: ICD-10-CM

## 2018-01-11 DIAGNOSIS — Z96.651 STATUS POST TOTAL RIGHT KNEE REPLACEMENT: ICD-10-CM

## 2018-01-11 PROCEDURE — 97110 THERAPEUTIC EXERCISES: CPT

## 2018-01-11 NOTE — PROGRESS NOTES
"                                                  Physical Therapy Daily Note     Date: 01/11/2018  Name: Camila Black  Clinic Number: [id number]  Diagnosis:   No diagnosis found.  Physician: Nany Irvin MD    Evaluation Date: 8/17/17  Date of Surgery: 7/28/17  Visit #: 22  Start Time:  105  Stop Time: 200  Total Treatment Time: 55    Precautions: standard, s/p R TKA with MCL reconstruction      Subjective     Pt reports no pain or soreness upon arrival, with usual level of anterior R knee stiffness.  Pain: 0/10    Objective     Measurements taken: (1/11/18)  R knee:  Flex: 108 deg (100 deg on 1/9/18)  Ext: 0 deg (from 1/4/18)    Patellofemoral joint accessory motion was assessed this visit: no significant hyper- or hypomobility noted at this time    Patient received group therapy to increase strength, endurance, ROM and flexibility with activities as follows:     Camila received therapeutic exercises to develop strength, endurance, ROM, flexibility and core stabilization for 45 minutes including:   Stat bike in available ROM x10 min  Heel slides x30  Prone with strap 2x2 min knee flex  Heel taps 5" x30-np  3 way hip 3# x30 R-np  HS curl 3# x30 R-np  Leg press 180# dbl leg 3x15  Heel raise on leg press 180# x30  U leg press R 120# 2x15  Clamshells gtb 2x20 B  Bridge with 3' hold using GTB 3x15   Lateral walking with GTB at ankles, 2 laps x20'  HSS x2 min -np  GSS x2 min -np  Prone hold-relax PNF stretch into knee flexion 4 bouts (5s hold/30s relax)      Written Home Exercises Provided: provided at initial eval  Pt demo good understanding of the education provided. Camila demonstrated good return demonstration of activities.     Education provided:  Camila verbalized good understanding of education provided.   No spiritual or educational barriers to learning identified.    Assessment   Pt showed improved knee flexion ROM compared to previous visits, and is able to requires no cueing to complete HEP. " Pt reports that she is able to accomplish daily tasks with little difficulty, and thus has no further skilled PT needs, and is cleared for discharge.    Progress towards goals: goals met     Plan   Discontinue PT secondary to patient meeting PT plateau.      Therapist: Kendall Quiles, SPT   Rosmery Spencer, PT, DPT    I certify that I was present in the room directing the student in service delivery and guiding them using my skilled judgement. As the co-signing therapist, I have reviewed the students documentation and am responsible for the treatment, assessment, and plan.    Rosmery Spencer, PT, DPT

## 2018-02-21 ENCOUNTER — HOSPITAL ENCOUNTER (OUTPATIENT)
Dept: RADIOLOGY | Facility: HOSPITAL | Age: 48
Discharge: HOME | End: 2018-02-21
Attending: ORTHOPAEDIC SURGERY
Payer: COMMERCIAL

## 2018-02-21 ENCOUNTER — OFFICE VISIT (OUTPATIENT)
Dept: SPORTS MEDICINE | Facility: CLINIC | Age: 48
End: 2018-02-21
Payer: COMMERCIAL

## 2018-02-21 VITALS
HEIGHT: 66 IN | DIASTOLIC BLOOD PRESSURE: 81 MMHG | WEIGHT: 282 LBS | BODY MASS INDEX: 45.32 KG/M2 | SYSTOLIC BLOOD PRESSURE: 127 MMHG | HEART RATE: 76 BPM

## 2018-02-21 DIAGNOSIS — M25.569 KNEE PAIN, UNSPECIFIED CHRONICITY, UNSPECIFIED LATERALITY: ICD-10-CM

## 2018-02-21 DIAGNOSIS — Z96.651 STATUS POST TOTAL RIGHT KNEE REPLACEMENT: ICD-10-CM

## 2018-02-21 DIAGNOSIS — M17.11 PRIMARY LOCALIZED OSTEOARTHROSIS OF RIGHT LOWER LEG: Chronic | ICD-10-CM

## 2018-02-21 DIAGNOSIS — M25.569 KNEE PAIN, UNSPECIFIED CHRONICITY, UNSPECIFIED LATERALITY: Primary | ICD-10-CM

## 2018-02-21 DIAGNOSIS — E66.01 MORBID OBESITY WITH BMI OF 40.0-44.9, ADULT: ICD-10-CM

## 2018-02-21 DIAGNOSIS — M25.561 ACUTE PAIN OF RIGHT KNEE: ICD-10-CM

## 2018-02-21 PROCEDURE — 73564 X-RAY EXAM KNEE 4 OR MORE: CPT | Mod: TC,50,FY,PO

## 2018-02-21 PROCEDURE — 3008F BODY MASS INDEX DOCD: CPT | Mod: S$GLB,,, | Performed by: ORTHOPAEDIC SURGERY

## 2018-02-21 PROCEDURE — 99999 PR PBB SHADOW E&M-EST. PATIENT-LVL III: CPT | Mod: PBBFAC,,, | Performed by: ORTHOPAEDIC SURGERY

## 2018-02-21 PROCEDURE — 99214 OFFICE O/P EST MOD 30 MIN: CPT | Mod: S$GLB,,, | Performed by: ORTHOPAEDIC SURGERY

## 2018-02-21 PROCEDURE — 73564 X-RAY EXAM KNEE 4 OR MORE: CPT | Mod: 26,50,, | Performed by: RADIOLOGY

## 2018-02-21 NOTE — PROGRESS NOTES
Subjective:          Chief Complaint: Camila Black is a 47 y.o. female who had concerns including Follow-up of the Right Knee.    Patient is here for a follow up for her right knee. She has been going to PT 2x a week with Rosmery Spencer. She is doing very well.     DATE OF PROCEDURE:  7/28/2017     ATTENDING SURGEON: Surgeon(s) and Role:     * Grace Irvin MD - Primary     FIRST ASSISTANT:Mlaik Salazar MD - Fellow  SECOND ASSISTANT: SMA Lisseth - Assistant     PREOPERATIVE DIAGNOSIS: Right Arthritis, Traumatic M12.50, DJD knee M17.9 and Genu Valgum (acquired) M21.069     POSTOPERATIVE DIAGNOSIS:  Right Arthritis, Traumatic M12.50, DJD knee M17.9 and Genu Valgum (acquired) M21.069     PROCEDURES PERFORMED:  Right  1.  Replacement, Knee, Medial and Lateral compartment (Total Knee) 18184, Complex  2.  Medial Collateral Ligament Reconstruction, (Extra-articular), 86043  Complexity of the case was increased due to the patient's BMI / body morphology, chronic nature of the problem and the degree of preoperative genu valgum noted.         Pain   Associated symptoms include joint swelling. Pertinent negatives include no abdominal pain, chest pain, chills, congestion, coughing, fever, headaches, myalgias, nausea, numbness, rash, sore throat or vomiting.          Review of Systems   Constitution: Negative. Negative for chills, fever, weight gain and weight loss.   HENT: Negative for congestion and sore throat.    Eyes: Negative for blurred vision and double vision.   Cardiovascular: Negative for chest pain, leg swelling and palpitations.   Respiratory: Negative for cough and shortness of breath.    Skin: Negative for itching, poor wound healing and rash.   Musculoskeletal: Positive for joint pain, joint swelling, muscle weakness and stiffness. Negative for back pain and myalgias.   Gastrointestinal: Negative for abdominal pain, constipation, diarrhea, nausea and vomiting.   Genitourinary: Negative.   Negative for frequency and hematuria.   Neurological: Negative for dizziness, headaches, numbness, paresthesias and sensory change.   Psychiatric/Behavioral: Negative for altered mental status and depression. The patient is not nervous/anxious.    Allergic/Immunologic: Negative for hives.       Pain Related Questions  Over the past 3 days, what was your average pain during activity? (I.e. running, jogging, walking, climbing stairs, getting dressed, ect.): 0  Over the past 3 days, what was your highest pain level?: 0  Over the past 3 days, what was your lowest pain level? : 0    Other  How many nights a week are you awakened by your affected body part?: 0  Was the patient's HEIGHT measured or patient reported?: Patient Reported  Was the patient's WEIGHT measured or patient reported?: Measured      Objective:        General: Camila is well-developed, well-nourished, appears stated age, in no acute distress, alert and oriented to time, place and person.     General    Vitals reviewed.  Constitutional: She is oriented to person, place, and time. She appears well-developed and well-nourished. No distress.   HENT:   Head: Normocephalic and atraumatic.   Mouth/Throat: No oropharyngeal exudate.   Eyes: EOM are normal. Right eye exhibits no discharge. Left eye exhibits no discharge.   Neck: Normal range of motion.   Cardiovascular: Normal rate and regular rhythm.    Pulmonary/Chest: Effort normal and breath sounds normal. No respiratory distress.   Neurological: She is alert and oriented to person, place, and time. She has normal reflexes. No cranial nerve deficit. Coordination normal.   Psychiatric: She has a normal mood and affect. Her behavior is normal. Judgment and thought content normal.     General Musculoskeletal Exam   Gait: antalgic       Right Knee Exam     Inspection   Erythema: absent  Scars: present  Swelling: absent  Effusion: effusion  Deformity: deformity  Bruising: absent    Tenderness   Right knee  tenderness location: anterior knee pain.    Range of Motion   Extension: -20   Flexion:  100 abnormal     Tests   Meniscus   Pedro:  Medial - negative Lateral - negative  Ligament Examination Lachman: normal (-1 to 2mm) PCL-Posterior Drawer: normal (0 to 2mm)     MCL - Valgus: normal (0 to 2mm)  LCL - Varus: normalPivot Shift: normal (Equal)Reverse Pivot Shift: normal (Equal)Dial Test at 30 degrees: normal (< 5 degrees)Dial Test at 90 degrees: normal (< 5 degrees)  Posterior Sag Test: negative  Posterolateral Corner: unstable (>15 degrees difference)  Patella   Patellar Apprehension: negative  Passive Patellar Tilt: neutral  Patellar Tracking: normal  Patellar Glide (quadrants): Lateral - 1   Medial - 2  Q-Angle at 90 degrees: normal  Patellar Grind: negative  J-Sign: none    Other   Meniscal Cyst: absent  Popliteal (Baker's) Cyst: absent  Sensation: normal    Left Knee Exam     Inspection   Erythema: absent  Scars: absent  Swelling: absent  Effusion: absent  Deformity: deformity  Bruising: absent    Tenderness   The patient is experiencing no tenderness.         Range of Motion   Extension: -20   Left knee flexion: 105.     Tests   Meniscus   Pedro:  Medial - negative Lateral - negative  Stability Lachman: normal (-1 to 2mm) PCL-Posterior Drawer: normal (0 to 2mm)  MCL - Valgus: normal (0 to 2mm)  LCL - Varus: normal (0 to 2mm)Pivot Shift: normal (Equal)Reverse Pivot Shift: normal (Equal)Dial Test at 30 degrees: normal (< 5 degrees)Dial Test at 90 degrees: normal (< 5 degrees)  Posterior Sag Test: negative  Posterolateral Corner: unstable (>15 degrees difference)  Patella   Patellar Apprehension: negative  Passive Patellar Tilt: neutral  Patellar Tracking: normal  Patellar Glide (Quadrants): Lateral - 1 Medial - 2  Q-Angle at 90 degrees: normal  Patellar Grind: positive  J-Sign: J sign absent    Other   Meniscal Cyst: absent  Popliteal (Baker's) Cyst: absent  Sensation: normal    Right Hip Exam     Tests    Venkata: negative  Left Hip Exam     Tests   Venkata: negative          Muscle Strength   Right Lower Extremity   Hip Abduction: 4/5   Quadriceps:  4/5   Hamstring:  3/5   Left Lower Extremity   Hip Abduction: 4/5   Quadriceps:  4/5   Hamstring:  3/5     Reflexes     Left Side  Quadriceps:  2+  Achilles:  2+    Right Side   Quadriceps:  2+  Achilles:  2+    Vascular Exam     Right Pulses  Dorsalis Pedis:      2+  Posterior Tibial:      2+        Left Pulses  Dorsalis Pedis:      2+  Posterior Tibial:      2+        Edema  Right Lower Leg: absent  Left Lower Leg: absent    RADIOGRAPHS TODAY  Radiographs ordered and reviewed today in clinic of the bilateral knee demonstrates right TKA with hardware in good position with medial screws for MCL reconstruction, medial and patellofemoral compartment wear on the left knee.                 Assessment:       Encounter Diagnoses   Name Primary?    Knee pain, unspecified chronicity, unspecified laterality Yes    Morbid obesity with BMI of 40.0-44.9, adult     Primary localized osteoarthrosis of right lower leg     Acute pain of right knee     Status post total right knee replacement           Plan:       1. IKDC, SF-12 and KOOS was filled out today in clinic.     RTC in 6 months with Dr. Grace Irvin Patient will fill out IKDC, SF-12 and KOOS.    2. Continue HEP, low impact exercise                                                Patient questionnaires may have been collected.

## 2018-02-23 DIAGNOSIS — M25.561 ACUTE PAIN OF RIGHT KNEE: ICD-10-CM

## 2018-02-23 RX ORDER — CELECOXIB 200 MG/1
200 CAPSULE ORAL 2 TIMES DAILY
Qty: 60 CAPSULE | Refills: 2 | Status: SHIPPED | OUTPATIENT
Start: 2018-02-23 | End: 2018-06-01 | Stop reason: SDUPTHER

## 2018-02-27 ENCOUNTER — PATIENT OUTREACH (OUTPATIENT)
Dept: OTHER | Facility: OTHER | Age: 48
End: 2018-02-27

## 2018-02-27 NOTE — PROGRESS NOTES
Last 5 Patient Entered Readings                                      Current 30 Day Average: 94/69     Recent Readings 2/22/2018 2/14/2018 2/5/2018 1/29/2018 1/16/2018    SBP (mmHg) 99 91 90 96 106    DBP (mmHg) 66 69 68 71 71    Pulse 81 62 70 69 62        Digital Medicine: Health  Follow Up    Lifestyle Modifications:    1.Dietary Modifications (Sodium intake <2,000mg/day, food labels, dining out): Patient denies changes to her diet. Patient states that lower BP readings may be caused by inadequate water consumption. Encouraged hydration.    2.Physical Activity: Ms. Black is attending physical therapy for her knee.    3.Medication Therapy: None    4.Patient has the following medication side effects/concerns: NA  (Frequency/Alleviating factors/Precipitating factors, etc.)     Follow up with Ms. Camila Black completed. Ms. Black is doing okay. No further questions or concerns. I will follow up in a few weeks to assess progress.

## 2018-03-27 ENCOUNTER — PATIENT OUTREACH (OUTPATIENT)
Dept: OTHER | Facility: OTHER | Age: 48
End: 2018-03-27

## 2018-03-27 NOTE — PROGRESS NOTES
Last 5 Patient Entered Readings                                      Current 30 Day Average: 115/81     Recent Readings 3/14/2018 3/3/2018 2/22/2018 2/14/2018 2/5/2018    SBP (mmHg) 102 128 99 91 90    DBP (mmHg) 75 86 66 69 68    Pulse 71 83 81 62 70        Patient request tech support via text 4/16/18. I will follow up once task is closed and readings are transmitting.    IT Outreach:  04/17 - CNA LVM  04/24 - CNA LVM   04/27 - CNA LVM  05/02 - CNA LVM   05/10 - CNA LVM  05/15 - CNA LVM. Unable to reach. Closing task.

## 2018-03-28 ENCOUNTER — PATIENT OUTREACH (OUTPATIENT)
Dept: OTHER | Facility: OTHER | Age: 48
End: 2018-03-28

## 2018-03-28 NOTE — PROGRESS NOTES
Last 5 Patient Entered Readings                                      Current 30 Day Average: 115/81     Recent Readings 3/14/2018 3/3/2018 2/22/2018 2/14/2018 2/5/2018    SBP (mmHg) 102 128 99 91 90    DBP (mmHg) 75 86 66 69 68    Pulse 71 83 81 62 70        Current HTN medications: none    Assessment/ Plan:   Called patient to follow up.   Per newly released 2017 ACC/ AHA HTN guidelines (goal of BP < 130/80), current 30-day average is well controlled.    Patient denies having questions or concerns. Instructed patient to call if she has any questions or concerns, patient confirms understanding.   Will continue to monitor. WCB in 3 months, sooner if BP begins to trend up or down.

## 2018-05-21 NOTE — PROGRESS NOTES
Last 5 Patient Entered Readings                                      Current 30 Day Average:      Recent Readings 3/14/2018 3/3/2018 2/22/2018 2/14/2018 2/5/2018    SBP (mmHg) 102 128 99 91 90    DBP (mmHg) 75 86 66 69 68    Pulse 71 83 81 62 70        Digital Medicine: Health  Follow Up    Left voicemail to follow up with Ms. Camila Black.  IT was unable to reach after several attempts. Will send [employer] message in 1 week and start noncompliance workflow if no response.

## 2018-05-29 NOTE — PROGRESS NOTES
Last 5 Patient Entered Readings                                      Current 30 Day Average:      Recent Readings 3/14/2018 3/3/2018 2/22/2018 2/14/2018 2/5/2018    SBP (mmHg) 102 128 99 91 90    DBP (mmHg) 75 86 66 69 68    Pulse 71 83 81 62 70        Patient states that she has an appointment tomorrow, and will bring her cuff to the [facility] for exchange. Will follow up in 1 week.

## 2018-06-01 DIAGNOSIS — M25.561 ACUTE PAIN OF RIGHT KNEE: ICD-10-CM

## 2018-06-01 RX ORDER — CELECOXIB 200 MG/1
200 CAPSULE ORAL 2 TIMES DAILY
Qty: 60 CAPSULE | Refills: 2 | Status: SHIPPED | OUTPATIENT
Start: 2018-06-01 | End: 2018-07-03 | Stop reason: SDUPTHER

## 2018-06-05 ENCOUNTER — PATIENT OUTREACH (OUTPATIENT)
Dept: OTHER | Facility: OTHER | Age: 48
End: 2018-06-05

## 2018-06-05 NOTE — PROGRESS NOTES
Last 5 Patient Entered Readings                                      Current 30 Day Average:      Recent Readings 3/14/2018 3/3/2018 2/22/2018 2/14/2018 2/5/2018    SBP (mmHg) 102 128 99 91 90    DBP (mmHg) 75 86 66 69 68    Pulse 71 83 81 62 70        Digital Medicine: Health  Follow Up    Left voicemail to follow up with MsNapoleon Camila Aramis Black.   Called to see if she was able to visit the [facility] after her appointment last week.  Will follow up in 1 week with [employer] message.

## 2018-06-05 NOTE — LETTER
Teri Davidson, PharmD  [address] [place] [address]     Dear Camila,    We have made several attempts to encourage your participation in Digital Medicine monitoring. Unfortunately, we have been unsuccessful and this is an official notice that you are no longer enrolled in Hypertension Digital Medicine Program, and thus, we will no longer be managing your hypertension.    Please note this has no impact on your relationship with [facility] or your providers. Going forward, please reach out to your primary care provider with any questions or concerns regarding your health.                         Please contact [phone] if you have any additional questions.     Sincerely,    The [facility] Digital Medicine Team                                                                                                                                            Camila Black  [address]  [place] [address]

## 2018-06-05 NOTE — LETTER
Jennifer Macias  [address] [place] [address]     Dear Camila,    Thank you for enrolling in the [facility] Hypertension Digital Medicine Program. To participate in our program, we ask that you submit a blood pressure reading at least once weekly through your [facility] Account and maintain regular contact with your Care Team.  We have not received any data or heard from you in some time.     The Digital Medicine Care Team has attempted to reach you on multiple occasions to determine if you would like to continue participating in the program. While we encourage you to continue participating fully, we understand that circumstances may change.      To continue participating in the program, please contact me at [phone]. If we do not hear back, you will be un-enrolled and your physician will be notified of your decision.    If you have submitted blood pressure readings during the past 128 days and believe you are receiving this letter in error, please call the Digital Medicine Patient Support Line at [phone] for troubleshooting.      We look forward to hearing from you soon.    Sincerely,     Jennifer Macias MA, CHES  Your [facility]                                                                                                                         Camila Black  [address]  [place] [address]

## 2018-06-13 NOTE — PROGRESS NOTES
Last 5 Patient Entered Readings                                      Current 30 Day Average:      Recent Readings 3/14/2018 3/3/2018 2/22/2018 2/14/2018 2/5/2018    SBP (mmHg) 102 128 99 91 90    DBP (mmHg) 75 86 66 69 68    Pulse 71 83 81 62 70        Messaged patient regarding lack of BP readings and update on visit to [facility].  Will follow up in 2 weeks.

## 2018-06-20 NOTE — PROGRESS NOTES
Last 5 Patient Entered Readings                                      Current 30 Day Average:      Recent Readings 3/14/2018 3/3/2018 2/22/2018 2/14/2018 2/5/2018    SBP (mmHg) 102 128 99 91 90    DBP (mmHg) 75 86 66 69 68    Pulse 71 83 81 62 70        Still no readings since 3/14. HC is currently working with patient to have digital cuff taken to the [facility]. Will continue to monitor, will follow up in 1 month.

## 2018-06-27 NOTE — PROGRESS NOTES
Last 5 Patient Entered Readings                                      Current 30 Day Average:      Recent Readings 3/14/2018 3/3/2018 2/22/2018 2/14/2018 2/5/2018    SBP (mmHg) 102 128 99 91 90    DBP (mmHg) 75 86 66 69 68    Pulse 71 83 81 62 70        Digital Medicine: Health  Follow Up    Left voicemail to follow up with Ms. Camila Black.  No readings since 3/14. Patient was supposed to visit the [facility] for technical assistance.  Consider starting noncompliance workflow. Will follow up in 2 weeks.

## 2018-07-03 DIAGNOSIS — M25.561 ACUTE PAIN OF RIGHT KNEE: ICD-10-CM

## 2018-07-03 RX ORDER — CELECOXIB 200 MG/1
200 CAPSULE ORAL 2 TIMES DAILY
Qty: 60 CAPSULE | Refills: 0 | Status: SHIPPED | OUTPATIENT
Start: 2018-07-03 | End: 2019-05-27 | Stop reason: SDUPTHER

## 2018-07-10 NOTE — PROGRESS NOTES
Last 5 Patient Entered Readings                                      Current 30 Day Average:      Recent Readings 3/14/2018 3/3/2018 2/22/2018 2/14/2018 2/5/2018    SBP (mmHg) 102 128 99 91 90    DBP (mmHg) 75 86 66 69 68    Pulse 71 83 81 62 70        Digital Medicine: Health  Follow Up    Left voicemail to follow up with Ms. Camila Black.  No current BP readings.   Will continue to follow up.

## 2018-07-13 NOTE — PROGRESS NOTES
Last 5 Patient Entered Readings                                      Current 30 Day Average:      Recent Readings 3/14/2018 3/3/2018 2/22/2018 2/14/2018 2/5/2018    SBP (mmHg) 102 128 99 91 90    DBP (mmHg) 75 86 66 69 68    Pulse 71 83 81 62 70        Still no readings since 3/14. HC will be starting the non-compliance workflow next week. Will continue to monitor, will follow up in 6 weeks.

## 2018-07-17 NOTE — PROGRESS NOTES
Last 5 Patient Entered Readings                                      Current 30 Day Average:      Recent Readings 3/14/2018 3/3/2018 2/22/2018 2/14/2018 2/5/2018    SBP (mmHg) 102 128 99 91 90    DBP (mmHg) 75 86 66 69 68    Pulse 71 83 81 62 70        Digital Medicine: Health  Follow Up    Left voicemail to follow up with Ms. Camila Black.  Still no readings. Will send noncompliance letter Friday, 7/20.  Will follow up in 2 weeks.

## 2018-08-03 NOTE — PROGRESS NOTES
Last 5 Patient Entered Readings                                      Current 30 Day Average:      Recent Readings 3/14/2018 3/3/2018 2/22/2018 2/14/2018 2/5/2018    SBP (mmHg) 102 128 99 91 90    DBP (mmHg) 75 86 66 69 68    Pulse 71 83 81 62 70          Digital Medicine: Health  Follow Up    Left voicemail to follow up with Ms. Camila Black.  No recent BP readings.  Will send certified discharge letter.

## 2018-08-06 NOTE — PROGRESS NOTES
Last 5 Patient Entered Readings                                      Current 30 Day Average:      Recent Readings 3/14/2018 3/3/2018 2/22/2018 2/14/2018 2/5/2018    SBP (mmHg) 102 128 99 91 90    DBP (mmHg) 75 86 66 69 68    Pulse 71 83 81 62 70        Discharge letter sent today.  Will notify enrolling provider at time of drop.

## 2018-08-15 ENCOUNTER — TELEPHONE (OUTPATIENT)
Dept: INTERNAL MEDICINE | Facility: CLINIC | Age: 48
End: 2018-08-15

## 2018-08-15 DIAGNOSIS — Z00.00 PHYSICAL EXAM, ANNUAL: Primary | ICD-10-CM

## 2018-08-15 RX ORDER — VIT C/E/ZN/COPPR/LUTEIN/ZEAXAN 250MG-90MG
1000 CAPSULE ORAL DAILY
Qty: 30 CAPSULE | Refills: 12 | Status: CANCELLED | OUTPATIENT
Start: 2018-08-15

## 2018-08-15 NOTE — TELEPHONE ENCOUNTER
Please call her, she is due for her annual exam with me, please schedule the next 1-2 months, with routine labs prior thank you    She had requested vitamin D which is actually over-the-counter.  However, she should have labs done before we make a determination about how much vitamin D she needs, so please schedule labs as above.  Thank you

## 2018-08-15 NOTE — TELEPHONE ENCOUNTER
----- Message from Jennifer Macias sent at 8/15/2018  9:32 AM CDT -----  Regarding: [facility] Discharge Notice  Dr. Tabitha Pandey MD,    Unfortunately, Ms. Camila Black has failed to meet basic participation requirements for the HTN digital medicine program and will be discharged from the program. We have tried to contact her on multiple occasions without success.  Unfortunately she is not a good candidate for digital medicine monitoring.     Thank you for your support of digital medicine! Please contact me if you have any questions or concerns.    Sincerely,  Jennifer Macias MA, [facility]  Digital Medicine Health

## 2018-08-15 NOTE — PROGRESS NOTES
Last 5 Patient Entered Readings                                      Current 30 Day Average:      Recent Readings 3/14/2018 3/3/2018 2/22/2018 2/14/2018 2/5/2018    SBP (mmHg) 102 128 99 91 90    DBP (mmHg) 75 86 66 69 68    Pulse 71 83 81 62 70        Patient remains noncompliant and will be discharged from the [facility] today.  Enrolling provider will be notified.

## 2018-09-03 DIAGNOSIS — M25.561 ACUTE PAIN OF RIGHT KNEE: ICD-10-CM

## 2018-09-04 RX ORDER — CELECOXIB 200 MG/1
200 CAPSULE ORAL 2 TIMES DAILY
Qty: 60 CAPSULE | Refills: 0 | Status: CANCELLED | OUTPATIENT
Start: 2018-09-04

## 2018-09-04 RX ORDER — CELECOXIB 200 MG/1
200 CAPSULE ORAL 2 TIMES DAILY
Qty: 60 CAPSULE | Refills: 2 | Status: SHIPPED | OUTPATIENT
Start: 2018-09-04 | End: 2018-09-26 | Stop reason: SDUPTHER

## 2018-09-19 ENCOUNTER — LAB VISIT (OUTPATIENT)
Dept: LAB | Facility: HOSPITAL | Age: 48
End: 2018-09-19
Attending: INTERNAL MEDICINE
Payer: COMMERCIAL

## 2018-09-19 ENCOUNTER — TELEPHONE (OUTPATIENT)
Dept: INTERNAL MEDICINE | Facility: CLINIC | Age: 48
End: 2018-09-19

## 2018-09-19 DIAGNOSIS — Z00.00 PHYSICAL EXAM, ANNUAL: ICD-10-CM

## 2018-09-19 DIAGNOSIS — E53.8 B12 DEFICIENCY: ICD-10-CM

## 2018-09-19 DIAGNOSIS — I10 ESSENTIAL HYPERTENSION: ICD-10-CM

## 2018-09-19 DIAGNOSIS — E55.9 MILD VITAMIN D DEFICIENCY: ICD-10-CM

## 2018-09-19 DIAGNOSIS — Z00.00 PHYSICAL EXAM, ANNUAL: Primary | ICD-10-CM

## 2018-09-19 LAB
25(OH)D3+25(OH)D2 SERPL-MCNC: 18 NG/ML
ALBUMIN SERPL BCP-MCNC: 3.2 G/DL
ALP SERPL-CCNC: 78 U/L
ALT SERPL W/O P-5'-P-CCNC: 11 U/L
ANION GAP SERPL CALC-SCNC: 6 MMOL/L
AST SERPL-CCNC: 17 U/L
BASOPHILS # BLD AUTO: 0.03 K/UL
BASOPHILS NFR BLD: 0.5 %
BILIRUB SERPL-MCNC: 0.6 MG/DL
BUN SERPL-MCNC: 17 MG/DL
CALCIUM SERPL-MCNC: 8.9 MG/DL
CHLORIDE SERPL-SCNC: 110 MMOL/L
CHOLEST SERPL-MCNC: 164 MG/DL
CHOLEST/HDLC SERPL: 2.6 {RATIO}
CO2 SERPL-SCNC: 26 MMOL/L
CREAT SERPL-MCNC: 0.8 MG/DL
DIFFERENTIAL METHOD: ABNORMAL
EOSINOPHIL # BLD AUTO: 0.3 K/UL
EOSINOPHIL NFR BLD: 4.1 %
ERYTHROCYTE [DISTWIDTH] IN BLOOD BY AUTOMATED COUNT: 14.3 %
EST. GFR  (AFRICAN AMERICAN): >60 ML/MIN/1.73 M^2
EST. GFR  (NON AFRICAN AMERICAN): >60 ML/MIN/1.73 M^2
ESTIMATED AVG GLUCOSE: 105 MG/DL
GLUCOSE SERPL-MCNC: 86 MG/DL
HBA1C MFR BLD HPLC: 5.3 %
HCT VFR BLD AUTO: 36.3 %
HDLC SERPL-MCNC: 64 MG/DL
HDLC SERPL: 39 %
HGB BLD-MCNC: 11.4 G/DL
LDLC SERPL CALC-MCNC: 93.2 MG/DL
LYMPHOCYTES # BLD AUTO: 2.4 K/UL
LYMPHOCYTES NFR BLD: 38.6 %
MCH RBC QN AUTO: 30 PG
MCHC RBC AUTO-ENTMCNC: 31.4 G/DL
MCV RBC AUTO: 96 FL
MONOCYTES # BLD AUTO: 0.5 K/UL
MONOCYTES NFR BLD: 7.3 %
NEUTROPHILS # BLD AUTO: 3.1 K/UL
NEUTROPHILS NFR BLD: 49.3 %
NONHDLC SERPL-MCNC: 100 MG/DL
PLATELET # BLD AUTO: 289 K/UL
PMV BLD AUTO: 9.3 FL
POTASSIUM SERPL-SCNC: 4.7 MMOL/L
PROT SERPL-MCNC: 6.6 G/DL
RBC # BLD AUTO: 3.8 M/UL
SODIUM SERPL-SCNC: 142 MMOL/L
TRIGL SERPL-MCNC: 34 MG/DL
TSH SERPL DL<=0.005 MIU/L-ACNC: 1.62 UIU/ML
VIT B12 SERPL-MCNC: 473 PG/ML
WBC # BLD AUTO: 6.32 K/UL

## 2018-09-19 PROCEDURE — 83036 HEMOGLOBIN GLYCOSYLATED A1C: CPT

## 2018-09-19 PROCEDURE — 80053 COMPREHEN METABOLIC PANEL: CPT

## 2018-09-19 PROCEDURE — 85025 COMPLETE CBC W/AUTO DIFF WBC: CPT

## 2018-09-19 PROCEDURE — 84443 ASSAY THYROID STIM HORMONE: CPT

## 2018-09-19 PROCEDURE — 80061 LIPID PANEL: CPT

## 2018-09-19 PROCEDURE — 36415 COLL VENOUS BLD VENIPUNCTURE: CPT

## 2018-09-19 PROCEDURE — 82306 VITAMIN D 25 HYDROXY: CPT

## 2018-09-19 PROCEDURE — 82607 VITAMIN B-12: CPT

## 2018-09-26 ENCOUNTER — OFFICE VISIT (OUTPATIENT)
Dept: INTERNAL MEDICINE | Facility: CLINIC | Age: 48
End: 2018-09-26
Payer: COMMERCIAL

## 2018-09-26 VITALS
HEIGHT: 67 IN | DIASTOLIC BLOOD PRESSURE: 80 MMHG | SYSTOLIC BLOOD PRESSURE: 125 MMHG | BODY MASS INDEX: 41.91 KG/M2 | WEIGHT: 267 LBS

## 2018-09-26 DIAGNOSIS — D23.4 BENIGN NEOPLASM OF SCALP AND SKIN OF NECK: ICD-10-CM

## 2018-09-26 DIAGNOSIS — I10 ESSENTIAL HYPERTENSION: ICD-10-CM

## 2018-09-26 DIAGNOSIS — Z00.00 ANNUAL PHYSICAL EXAM: Primary | ICD-10-CM

## 2018-09-26 DIAGNOSIS — E53.8 B12 DEFICIENCY: ICD-10-CM

## 2018-09-26 DIAGNOSIS — D51.9 B12 DEFICIENCY ANEMIA: ICD-10-CM

## 2018-09-26 DIAGNOSIS — E55.9 MILD VITAMIN D DEFICIENCY: ICD-10-CM

## 2018-09-26 DIAGNOSIS — E66.01 MORBID OBESITY WITH BMI OF 40.0-44.9, ADULT: ICD-10-CM

## 2018-09-26 DIAGNOSIS — E61.1 IRON DEFICIENCY: ICD-10-CM

## 2018-09-26 PROBLEM — M25.561 RIGHT KNEE PAIN: Status: RESOLVED | Noted: 2017-08-17 | Resolved: 2018-09-26

## 2018-09-26 PROCEDURE — 99396 PREV VISIT EST AGE 40-64: CPT | Mod: S$GLB,,, | Performed by: INTERNAL MEDICINE

## 2018-09-26 PROCEDURE — 99999 PR PBB SHADOW E&M-EST. PATIENT-LVL IV: CPT | Mod: PBBFAC,,, | Performed by: INTERNAL MEDICINE

## 2018-09-26 RX ORDER — VIT C/E/ZN/COPPR/LUTEIN/ZEAXAN 250MG-90MG
2000 CAPSULE ORAL DAILY
Qty: 60 CAPSULE | Refills: 12 | Status: SHIPPED | OUTPATIENT
Start: 2018-09-26 | End: 2020-03-16

## 2018-09-26 RX ORDER — ERGOCALCIFEROL 1.25 MG/1
50000 CAPSULE ORAL
Qty: 12 CAPSULE | Refills: 3 | Status: SHIPPED | OUTPATIENT
Start: 2018-09-26 | End: 2020-03-16

## 2018-09-26 RX ORDER — LANOLIN ALCOHOL/MO/W.PET/CERES
1000 CREAM (GRAM) TOPICAL DAILY
Qty: 30 TABLET | Refills: 12 | Status: SHIPPED | OUTPATIENT
Start: 2018-09-26 | End: 2020-03-16

## 2018-09-26 NOTE — PROGRESS NOTES
Subjective:       Patient ID: Camila Black is a 48 y.o. female.    Chief Complaint: Annual Exam    Annual exam    Recall last year R TKR    This year L tendon surgery    Working hard to lose weight, has made some progress- challenging due to not being able to exercise.    Medrol dose pack x 2.    LMP less than a 1 year ago- had an ablation so she is not sure.  Some hot flashes feels hot all the time.    Overdue for Dermatology follow-up.    Labs reviewed, low-normal B12 and low vitamin-D.    Patient Active Problem List:     Mild vitamin D deficiency     Unspecified hypertrophic and atrophic condition of skin     Benign neoplasm of scalp and skin of neck     Varicose veins of lower extremities with other complications     Iron deficiency     Morbid obesity with BMI of 40.0-44.9, adult     S/P endometrial ablation     Primary localized osteoarthrosis, lower leg     ARPIT (obstructive sleep apnea)     Gastroesophageal reflux disease: normal EGD 2/16     S/P gastric bypass     Gallbladder sludge: see ultrasound 12/15     B12 deficiency     Essential hypertension     Traumatic arthritis of right knee     Status post total right knee replacement        Review of Systems   Constitutional: Positive for activity change and unexpected weight change.   HENT: Negative for hearing loss, rhinorrhea and trouble swallowing.    Eyes: Negative for discharge and visual disturbance.   Respiratory: Negative for chest tightness and wheezing.    Cardiovascular: Negative for chest pain and palpitations.   Gastrointestinal: Negative for blood in stool, constipation, diarrhea and vomiting.   Endocrine: Negative for polydipsia and polyuria.   Genitourinary: Negative for difficulty urinating, dysuria, hematuria and menstrual problem.        Menopause sx   Musculoskeletal: Negative for arthralgias, joint swelling and neck pain.   Neurological: Negative for weakness and headaches.   Psychiatric/Behavioral: Negative for confusion and  dysphoric mood.       Objective:      Physical Exam   Constitutional: She is oriented to person, place, and time. She appears well-developed and well-nourished.   HENT:   Head: Normocephalic and atraumatic.   Right Ear: External ear normal.   Left Ear: External ear normal.   Nose: Nose normal.   Mouth/Throat: Oropharynx is clear and moist. No oropharyngeal exudate.   Eyes: Conjunctivae and EOM are normal. No scleral icterus.   Neck: Normal range of motion. Neck supple. No JVD present. No thyromegaly present.   Cardiovascular: Normal rate, regular rhythm, normal heart sounds and intact distal pulses. Exam reveals no gallop.   No murmur heard.  Pulmonary/Chest: Effort normal and breath sounds normal. No respiratory distress. She has no wheezes.   Abdominal: Soft. Bowel sounds are normal. She exhibits no distension and no mass. There is no tenderness. There is no rebound and no guarding.   Musculoskeletal: Normal range of motion. She exhibits no edema or tenderness.   Lymphadenopathy:     She has no cervical adenopathy.   Neurological: She is alert and oriented to person, place, and time. She displays normal reflexes. No cranial nerve deficit. Coordination normal.   Skin: Skin is warm. No rash noted. No erythema.   Psychiatric: She has a normal mood and affect. Her behavior is normal. Judgment and thought content normal.   Nursing note and vitals reviewed.      Assessment:       1. Annual physical exam    2. Essential hypertension    3. Morbid obesity with BMI of 40.0-44.9, adult    4. B12 deficiency anemia    5. Iron deficiency    6. Mild vitamin D deficiency    7. B12 deficiency    8. Benign neoplasm of scalp and skin of neck        Plan:     Annual physical exam    Essential hypertension: Low salt diet, exercise, 5-10-# weight loss in next 6-12 months'  time.  Call if BP > 130/80 on a regular basis.    Morbid obesity with BMI of 40.0-44.9, adult:  Keep working on lifestyle modification, exercise and weight loss.   She is thinking she can do this on her own does not require any additional assistance currently    B12 deficiency anemia  -     cyanocobalamin (VITAMIN B-12) 1000 MCG tablet; Take 1 tablet (1,000 mcg total) by mouth once daily.  Dispense: 30 tablet; Refill: 12    Iron deficiency  -     Ferritin; Future; Expected date: 12/26/2018  -     Iron and TIBC; Future; Expected date: 12/26/2018    Mild vitamin D deficiency  -     Vitamin D; Future; Expected date: 12/27/2018    B12 deficiency  -     Vitamin B12; Future; Expected date: 12/27/2018    Benign neoplasm of scalp and skin of neck  -     Ambulatory referral to Dermatology    Other orders  -     cholecalciferol, vitamin D3, (VITAMIN D3) 1,000 unit capsule; Take 2 capsules (2,000 Units total) by mouth once daily.  Dispense: 60 capsule; Refill: 12  -     ergocalciferol (ERGOCALCIFEROL) 50,000 unit Cap; Take 1 capsule (50,000 Units total) by mouth every 7 days.  Dispense: 12 capsule; Refill: 3    She will get flu shot at work    I will review all studies and determine further tx depending on findings

## 2018-09-26 NOTE — PATIENT INSTRUCTIONS
Understanding Menopause  Menopause marks the point where youve gone 12 months in a row without a period. The average age for this is around 51, but it can happen at younger or older ages. During the months or years before menopause, your body goes through many changes. It may be helpful to understand these changes and what you can do about the symptoms that result.     Use a portable fan to help stay cool.    Symptoms  Perimenopause is sometimes called the menopause transition. It happens in the months or years before menopause. It may begin when you reach your mid-40s. During this time, your estrogen levels go up and down and then decrease. As a result, you may notice some of the following symptoms:  · Menstrual periods that come more or less often than usual  · Menstrual periods that are lighter or heavier than normal  · Increased premenstrual syndrome (PMS) symptoms  · Hot flashes  · Night sweats  · Mood swings  · Vaginal dryness with possible painful sexual activity  · Difficulty going to sleep or staying asleep  · Decreased sexual drive and function  · Urinating frequently  It is important to remember that you could become pregnant until 12 months have passed since your last menstrual period. Ask your healthcare provider about birth control choices.   Controlling symptoms  Your healthcare provider may suggest pills or an intrauterine device (IUD) that contain the hormone progesterone. This can make your periods more regular and prevent excess bleeding. If you have symptoms due to lower estrogen levels, your healthcare provider may suggest pills that contain estrogen and/or progesterone. This is called hormone therapy (HT).  There are also other prescription medicines that help control some of the bothersome symptoms, like hot flashes, mood swings, and vaginal dryness.  Other ways for you to deal with symptoms are listed below.  · Hot flashes. Wear layers that you can remove. Try all-cotton clothing, sheets,  and blankets. Keep a glass of cold water by your bed.  · Pain during sex. You can buy a water-based lubricant or vaginal moisturizer in the drugstore that may help. Your healthcare provider may also prescribe an estrogen cream for your vagina.  · Mood swings. Talking to friends who are going through the same changes can sometimes help.  Date Last Reviewed: 12/1/2016  © 6750-2310 [employer]. [address] [place] [address], [place] [address]. All rights reserved. This information is not intended as a substitute for professional medical care. Always follow your healthcare professional's instructions.        Perimenopause  Menopause is when you stop having periods for good. For many women, this happens around age 50. The time of change before this is called perimenopause. It may start in your late 30s or 40s. It can last for months or years. During this time, your body makes lower levels of female hormones. This causes certain changes in your body. You may already have begun to feel the effects of these changes. By taking steps to relieve symptoms, you can still feel good.    The menstrual cycle  Hormones are chemicals that have specific jobs in the body. A menstrual cycle is caused by changes in the levels of 2 female hormones. These hormones are estrogen and progesterone. They are made by the ovaries. In a normal cycle, estrogen creates a lining in the uterus to allow for pregnancy. The ovary then releases an egg. This is called ovulation. Progesterone levels start to go up. If the egg is not fertilized, progesterone levels go down. This causes the lining in the uterus to be shed. This is the bleeding that is your period.  Changes in hormones  As a woman ages, her ovaries begin to make hormones less regularly. In some months, there may not be ovulation. Without ovulation, progesterone isn't secreted so a normal period doesn't occur. The menstrual cycle will be harder to predict. Over time, the ovaries stop  working. This can cause symptoms. Some women who have had their uterus taken out (hysterectomy) but still have ovaries may still have symptoms. When estrogen levels reach their lowest point, periods will stop completely. This is menopause.  Symptoms of perimenopause  The change in hormones can cause physical symptoms. It can also cause emotional symptoms. These may include:  · Periods that come more or less often  · Periods that are lighter or heavier than youre used to  · Hot flashes, night sweats, or trouble sleeping  · Vaginal dryness, which may make sex painful  · Mood swings or fatigue  · Palpitations  · Sleep disturbances  · Urinary symptoms including frequency and incontinence.  Medications that may help  Some medications can help ease the effects of perimenopause. These include:  · Low-dose birth control pills. These often contain both estrogen and progesterone. They can help regulate your periods.  · Hormone therapy (HT). This replaces some of the hormones your body has stopped making.  · Antidepressants. These help balance brain chemicals that may decrease during this time. Signs of depression can include often feeling sad or hopeless. If you feel this way, be sure to talk to your health care provider.  · Gabapentin. This is a medication also used to treat seizures. This controls hot flashes and night sweats in some women.  · Clonidine. This medication may control hot flashes and night sweats.  Date Last Reviewed: 4/26/2015  © 8338-7573 [employer]. [address] [place] [address], [place] [address]. All rights reserved. This information is not intended as a substitute for professional medical care. Always follow your healthcare professional's instructions.        Prevention Guidelines, Women Ages 50 to 64  Screening tests and vaccines are an important part of managing your health. Health counseling is essential, too. Below are guidelines for these, for women ages 50 to 64. Talk with your healthcare  provider to make sure youre up to date on what you need.  Screening Who needs it How often   Type 2 diabetes or prediabetes All adults beginning at age 45 and adults without symptoms at any age who are overweight or obese and have 1 or more additional risk factors for diabetes. At  least every 3 years   Alcohol misuse All women in this age group At routine exams   Blood pressure All women in this age group Every 2 years if your blood pressure is less than 120/80 mm Hg; yearly if your systolic blood pressure is 120 to 139 mm Hg, or your diastolic blood pressure reading is 80 to 89 mm Hg   Breast cancer All women in this age group Yearly mammogram and clinical breast exam1   Cervical cancer All women in this age group, except women who have had a complete hysterectomy Pap test every 3 years or Pap test with human papillomavirus (HPV) test every 5 years   Chlamydia Women at increased risk for infection At routine exams   Colorectal cancer All women in this age group Flexible sigmoidoscopy every 5 years, or colonoscopy every 10 years, or double-contrast barium enema every 5 years; yearly fecal occult blood test or fecal immunochemical test; or a stool DNA test as often as your health care provider advises; talk with your health care provider about which tests are best for you   Depression All women in this age group At routine exams   Gonorrhea Sexually active women at increased risk for infection At routine exams   Hepatitis C Anyone at increased risk; 1 time for those born between 1945 and 1965 At routine exams   High cholesterol or triglycerides All women in this age group who are at risk for coronary artery disease At least every 5 years   HIV All women At routine exams   Lung cancer Adults age 55 to 80 who have smoked Yearly screening in smokers with 30 pack-year history of smoking or who quit within 15 years   Obesity All women in this age group At routine exams   Osteoporosis Women who are postmenopausal Ask  your healthcare provider   Syphilis Women at increased risk for infection - talk with your healthcare provider At routine exams   Tuberculosis Women at increased risk for infection - talk with your healthcare provider Ask your healthcare provider   Vision All women in this age group Ask your healthcare provider   Vaccine Who needs it How often   Chickenpox (varicella) All women in this age group who have no record of this infection or vaccine 2 doses; the second dose should be given at least 4 weeks after the first dose   Hepatitis A Women at increased risk for infection - talk with your healthcare provider 2 doses given at least 6 months apart   Hepatitis B Women at increased risk for infection - talk with your healthcare provider 3 doses over 6 months; second dose should be given 1 month after the first dose; the third dose should be given at least 2 months after the second dose and at least 4 months after the first dose   Haemophilus influenzaeType B (HIB) Women at increased risk for infection - talk with your healthcare provider 1 to 3 doses   Influenza (flu) All women in this age group Once a year   Measles, mumps, rubella (MMR) Women in this age group through their late 50s who have no record of these infections or vaccines 1 dose   Meningococcal Women at increased risk for infection - talk with your healthcare provider 1 or more doses   Pneumococcal conjugate vaccine (PCV13) and pneumococcal polysaccharide vaccine (PPSV23) Women at increased risk for infection - talk with your healthcare provider PCV13: 1 dose ages 19 to 65 (protects against 13 types of pneumococcal bacteria)  PPSV23: 1 to 2 doses through age 64, or 1 dose at 65 or older (protects against 23 types of pneumococcal bacteria)   Tetanus/diphtheria/pertussis (Td/Tdap) booster All women in this age group Td every 10 years, or a one-time dose of Tdap instead of a Td booster after age 18, then Td every 10 years   Zoster All women ages 60 and older 1  dose   Counseling Who needs it How often   BRCA gene mutation testing for breast and ovarian cancer susceptibility Women with increased risk for having gene mutation When your risk is known   Breast cancer and chemoprevention Women at high risk for breast cancer When your risk is known   Diet and exercise Women who are overweight or obese When diagnosed, and then at routine exams   Sexually transmitted infection prevention Women at increased risk for infection - talk with your healthcare provider At routine exams   Use of daily aspirin Women ages 55 and up in this age group who are at risk for cardiovascular health problems such as stroke When your risk is known   Use of tobacco and the health effects it can cause All women in this age group Every exam   1American [employer]  Date Last Reviewed: 1/26/2016  © 0838-1605 [employer]. [address] [place] [address], [place] [address]. All rights reserved. This information is not intended as a substitute for professional medical care. Always follow your healthcare professional's instructions.

## 2018-11-07 ENCOUNTER — HOSPITAL ENCOUNTER (OUTPATIENT)
Dept: RADIOLOGY | Facility: HOSPITAL | Age: 48
Discharge: HOME | End: 2018-11-07
Attending: PHYSICIAN ASSISTANT
Payer: COMMERCIAL

## 2018-11-07 ENCOUNTER — OFFICE VISIT (OUTPATIENT)
Dept: SPORTS MEDICINE | Facility: CLINIC | Age: 48
End: 2018-11-07
Payer: COMMERCIAL

## 2018-11-07 VITALS
HEART RATE: 70 BPM | BODY MASS INDEX: 41.91 KG/M2 | SYSTOLIC BLOOD PRESSURE: 141 MMHG | WEIGHT: 267 LBS | HEIGHT: 67 IN | DIASTOLIC BLOOD PRESSURE: 90 MMHG

## 2018-11-07 DIAGNOSIS — M25.561 RIGHT KNEE PAIN, UNSPECIFIED CHRONICITY: Primary | ICD-10-CM

## 2018-11-07 DIAGNOSIS — E66.01 MORBID OBESITY WITH BMI OF 40.0-44.9, ADULT: ICD-10-CM

## 2018-11-07 DIAGNOSIS — M21.061 GENU VALGUM, RIGHT: ICD-10-CM

## 2018-11-07 DIAGNOSIS — M25.561 RIGHT KNEE PAIN, UNSPECIFIED CHRONICITY: ICD-10-CM

## 2018-11-07 DIAGNOSIS — Z96.651 S/P TOTAL KNEE REPLACEMENT, RIGHT: ICD-10-CM

## 2018-11-07 PROCEDURE — 99999 PR PBB SHADOW E&M-EST. PATIENT-LVL III: CPT | Mod: PBBFAC,,, | Performed by: PHYSICIAN ASSISTANT

## 2018-11-07 PROCEDURE — 97110 THERAPEUTIC EXERCISES: CPT | Mod: S$GLB,,, | Performed by: PHYSICIAN ASSISTANT

## 2018-11-07 PROCEDURE — 99214 OFFICE O/P EST MOD 30 MIN: CPT | Mod: S$GLB,,, | Performed by: PHYSICIAN ASSISTANT

## 2018-11-07 PROCEDURE — 73564 X-RAY EXAM KNEE 4 OR MORE: CPT | Mod: TC,50,FY,PO

## 2018-11-07 PROCEDURE — 73564 X-RAY EXAM KNEE 4 OR MORE: CPT | Mod: 26,LT,, | Performed by: RADIOLOGY

## 2018-11-07 PROCEDURE — 73564 X-RAY EXAM KNEE 4 OR MORE: CPT | Mod: 26,RT,, | Performed by: RADIOLOGY

## 2018-11-07 NOTE — PROGRESS NOTES
Subjective:          Chief Complaint: Camila Black is a 48 y.o. female who had concerns including Follow-up of the Right Knee.    Patient is here for a follow up for her right knee s/p right TKA 1 year ago. No pain today. She is doing very well. Does report stiffness during weather changes.    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  Replacement, Knee, Medial and Lateral compartment (Total Knee) 70619, Complex  2.  Medial Collateral Ligament Reconstruction, (Extra-articular), 39453  Complexity of the case was increased due to the patient's BMI / body morphology, chronic nature of the problem and the degree of preoperative genu valgum noted.         Pain   Pertinent negatives include no abdominal pain, chest pain, chills, congestion, coughing, fever, headaches, joint swelling, myalgias, nausea, numbness, rash, sore throat or vomiting.          Review of Systems   Constitution: Negative. Negative for chills, fever, weight gain and weight loss.   HENT: Negative for congestion and sore throat.    Eyes: Negative for blurred vision and double vision.   Cardiovascular: Negative for chest pain, leg swelling and palpitations.   Respiratory: Negative for cough and shortness of breath.    Skin: Negative for itching, poor wound healing and rash.   Musculoskeletal: Positive for stiffness. Negative for back pain, joint pain, joint swelling, muscle weakness and myalgias.   Gastrointestinal: Negative for abdominal pain, constipation, diarrhea, nausea and vomiting.   Genitourinary: Negative.   Negative for frequency and hematuria.   Neurological: Negative for dizziness, headaches, numbness, paresthesias and sensory change.   Psychiatric/Behavioral: Negative for altered mental status and depression. The patient is not nervous/anxious.    Allergic/Immunologic: Negative for hives.       Pain Related Questions  Over the past 3 days, what was your average pain during activity? (I.e. running, jogging, walking, climbing stairs, getting dressed, ect.): 0  Over the past 3 days, what was your highest pain level?: 0  Over the past 3 days, what was your lowest pain level? : 0    Other  How many nights a week are you awakened by your affected body part?: 0  Was the patient's HEIGHT measured or patient reported?: Patient Reported  Was the patient's WEIGHT measured or patient reported?: Measured      Objective:        General: Camila is well-developed, well-nourished, appears stated age, in no acute distress, alert and oriented to time, place and person.     General    Vitals reviewed.  Constitutional: She is oriented to person, place, and time. She appears well-developed and well-nourished. No distress.   HENT:   Head: Normocephalic and atraumatic.   Mouth/Throat: No oropharyngeal exudate.   Eyes: EOM are normal. Right eye exhibits no discharge. Left eye exhibits no discharge.   Neck: Normal range of motion.   Cardiovascular: Normal rate and regular rhythm.    Pulmonary/Chest: Effort normal and breath sounds normal. No respiratory distress.   Neurological: She is alert and oriented to person, place, and time. She has normal reflexes. No cranial nerve deficit. Coordination normal.   Psychiatric: She has a normal mood and affect. Her behavior is normal. Judgment and thought content normal.     General Musculoskeletal Exam   Gait: abnormal       Right Knee Exam     Inspection   Erythema: absent  Scars: present  Swelling: absent  Effusion: absent  Deformity: absent  Bruising: absent    Tenderness   The patient is  experiencing no tenderness.     Range of Motion   Extension: -20   Flexion:  100 abnormal     Tests   Meniscus   Pedro:  Medial - negative Lateral - negative  Ligament Examination Lachman: normal (-1 to 2mm) PCL-Posterior Drawer: normal (0 to 2mm)     MCL - Valgus: normal (0 to 2mm)  LCL - Varus: normalPivot Shift: normal (Equal)Reverse Pivot Shift: normal (Equal)Dial Test at 30 degrees: normal (< 5 degrees)Dial Test at 90 degrees: normal (< 5 degrees)  Posterior Sag Test: negative  Posterolateral Corner: unstable (>15 degrees difference)  Patella   Patellar apprehension: negative  Passive Patellar Tilt: neutral  Patellar Tracking: normal  Patellar Glide (quadrants): Lateral - 1   Medial - 2  Q-Angle at 90 degrees: normal  Patellar Grind: negative  J-Sign: none    Other   Meniscal Cyst: absent  Popliteal (Baker's) Cyst: absent  Sensation: normal    Left Knee Exam     Inspection   Erythema: absent  Scars: absent  Swelling: absent  Effusion: absent  Deformity: absent  Bruising: absent    Tenderness   The patient is experiencing no tenderness.     Range of Motion   Extension: -20   Left knee flexion: 105.     Tests   Meniscus   Pedro:  Medial - negative Lateral - negative  Stability Lachman: normal (-1 to 2mm) PCL-Posterior Drawer: normal (0 to 2mm)  MCL - Valgus: normal (0 to 2mm)  LCL - Varus: normal (0 to 2mm)Pivot Shift: normal (Equal)Reverse Pivot Shift: normal (Equal)Dial Test at 30 degrees: normal (< 5 degrees)Dial Test at 90 degrees: normal (< 5 degrees)  Posterior Sag Test: negative  Posterolateral Corner: unstable (>15 degrees difference)  Patella   Patellar apprehension: negative  Passive Patellar Tilt: neutral  Patellar Tracking: normal  Patellar Glide (Quadrants): Lateral - 1 Medial - 2  Q-Angle at 90 degrees: normal  Patellar Grind: positive  J-Sign: J sign absent    Other   Meniscal Cyst: absent  Popliteal (Baker's) Cyst: absent  Sensation: normal    Right Hip Exam     Tests   Venkata: negative  Left  Hip Exam     Tests   Venkata: negative          Muscle Strength   Right Lower Extremity   Hip Abduction: 4/5   Quadriceps:  4/5   Hamstring:  3/5   Left Lower Extremity   Hip Abduction: 4/5   Quadriceps:  4/5   Hamstring:  3/5     Reflexes     Left Side  Quadriceps:  2+  Achilles:  2+    Right Side   Quadriceps:  2+  Achilles:  2+    Vascular Exam     Right Pulses  Dorsalis Pedis:      2+  Posterior Tibial:      2+        Left Pulses  Dorsalis Pedis:      2+  Posterior Tibial:      2+        Edema  Right Lower Leg: absent  Left Lower Leg: absent    RADIOGRAPHS TODAY  Radiographs ordered and reviewed today in clinic of the bilateral knee demonstrates right TKA with hardware in good position with medial screws for MCL reconstruction, medial and patellofemoral compartment wear on the left knee.                 Assessment:       Encounter Diagnoses   Name Primary?    Right knee pain, unspecified chronicity Yes    S/P total knee replacement, right     Morbid obesity with BMI of 40.0-44.9, adult     Genu valgum, right           Plan:       1. IKDC, SF-12 and KOOS was filled out today in clinic.     RTC in 6 months with Dr. Grace Irvin Patient will fill out IKDC, SF-12 and KOOS.    2. HEP 11859 - Nasim Leal PA-C, instructed and demonstrated a patellofemoral HEP. The patient then demonstrated understanding of exercises and proper technique. This program was performed for 15 minutes.     3. Celebrex 200 mg twice daily PRN for pain management.    4. Ice compress to the affected area 2-3x a day for 15-20 minutes as needed for pain management.      All of the patient's questions were answered and the patient will contact us if they have any questions or concerns in the interim.                                          Patient questionnaires may have been collected.

## 2019-03-29 DIAGNOSIS — Z12.11 COLON CANCER SCREENING: ICD-10-CM

## 2019-05-27 DIAGNOSIS — M25.561 ACUTE PAIN OF RIGHT KNEE: ICD-10-CM

## 2019-05-28 DIAGNOSIS — M25.561 ACUTE PAIN OF RIGHT KNEE: ICD-10-CM

## 2019-05-28 RX ORDER — CELECOXIB 200 MG/1
CAPSULE ORAL
Qty: 180 CAPSULE | Refills: 0 | Status: SHIPPED | OUTPATIENT
Start: 2019-05-28 | End: 2019-12-30 | Stop reason: SDUPTHER

## 2019-05-28 RX ORDER — CELECOXIB 200 MG/1
200 CAPSULE ORAL 2 TIMES DAILY
Qty: 60 CAPSULE | Refills: 0 | Status: SHIPPED | OUTPATIENT
Start: 2019-05-28 | End: 2020-03-16

## 2019-06-11 DIAGNOSIS — Z12.39 BREAST CANCER SCREENING: ICD-10-CM

## 2019-11-22 ENCOUNTER — TELEPHONE (OUTPATIENT)
Dept: SPORTS MEDICINE | Facility: CLINIC | Age: 49
End: 2019-11-22

## 2019-12-12 ENCOUNTER — TELEPHONE (OUTPATIENT)
Dept: SPORTS MEDICINE | Facility: CLINIC | Age: 49
End: 2019-12-12

## 2019-12-12 NOTE — TELEPHONE ENCOUNTER
GAVI[facility]AM informing patient that Dr. Irvin would not be available for the 12/23/19 appointment. Asked that patient return call to scheduled next available

## 2019-12-13 ENCOUNTER — TELEPHONE (OUTPATIENT)
Dept: SPORTS MEDICINE | Facility: CLINIC | Age: 49
End: 2019-12-13

## 2019-12-13 NOTE — TELEPHONE ENCOUNTER
Attempted to reach patient to reschedule 12/23 appointment. Patient answered, asked me to hold, and eventually hung up. Will try to call back later.

## 2019-12-30 DIAGNOSIS — M25.561 ACUTE PAIN OF RIGHT KNEE: ICD-10-CM

## 2019-12-30 RX ORDER — CELECOXIB 200 MG/1
200 CAPSULE ORAL 2 TIMES DAILY
Qty: 180 CAPSULE | Refills: 0 | Status: SHIPPED | OUTPATIENT
Start: 2019-12-30 | End: 2020-05-12

## 2020-01-07 ENCOUNTER — TELEPHONE (OUTPATIENT)
Dept: SPORTS MEDICINE | Facility: CLINIC | Age: 50
End: 2020-01-07

## 2020-01-07 NOTE — TELEPHONE ENCOUNTER
Radha INFANTE regarding appointment with Dr. Irvin on 1/22.  Patient will need to be scheduled at a later time due to Dr. Irvin being away.   Would like to offer 9:30a, 9:45a, or 12p.

## 2020-01-30 ENCOUNTER — TELEPHONE (OUTPATIENT)
Dept: SPORTS MEDICINE | Facility: CLINIC | Age: 50
End: 2020-01-30

## 2020-01-30 NOTE — TELEPHONE ENCOUNTER
LVM for patient to call back back regarding her appointment with Dr. Irvin on 2/3. Let pt know we wanted to see if she was available to see Nasim Leal PA-C some time next week (2/4-2/7).

## 2020-02-04 ENCOUNTER — OFFICE VISIT (OUTPATIENT)
Dept: SPORTS MEDICINE | Facility: CLINIC | Age: 50
End: 2020-02-04
Payer: COMMERCIAL

## 2020-02-04 ENCOUNTER — HOSPITAL ENCOUNTER (OUTPATIENT)
Dept: RADIOLOGY | Facility: HOSPITAL | Age: 50
Discharge: HOME | End: 2020-02-04
Attending: PHYSICIAN ASSISTANT
Payer: COMMERCIAL

## 2020-02-04 VITALS
SYSTOLIC BLOOD PRESSURE: 163 MMHG | WEIGHT: 267 LBS | BODY MASS INDEX: 41.91 KG/M2 | DIASTOLIC BLOOD PRESSURE: 94 MMHG | HEART RATE: 71 BPM | HEIGHT: 67 IN

## 2020-02-04 DIAGNOSIS — Z96.651 STATUS POST TOTAL RIGHT KNEE REPLACEMENT: Primary | ICD-10-CM

## 2020-02-04 DIAGNOSIS — M25.561 RIGHT KNEE PAIN, UNSPECIFIED CHRONICITY: ICD-10-CM

## 2020-02-04 DIAGNOSIS — E66.01 MORBID OBESITY WITH BMI OF 40.0-44.9, ADULT: ICD-10-CM

## 2020-02-04 PROCEDURE — 97110 THERAPEUTIC EXERCISES: CPT | Mod: S$GLB,,, | Performed by: PHYSICIAN ASSISTANT

## 2020-02-04 PROCEDURE — 97110 PR THERAPEUTIC EXERCISES: ICD-10-PCS | Mod: S$GLB,,, | Performed by: PHYSICIAN ASSISTANT

## 2020-02-04 PROCEDURE — 73564 X-RAY EXAM KNEE 4 OR MORE: CPT | Mod: TC,50

## 2020-02-04 PROCEDURE — 73564 XR KNEE ORTHO BILAT WITH FLEXION: ICD-10-PCS | Mod: 26,50,, | Performed by: RADIOLOGY

## 2020-02-04 PROCEDURE — 99213 OFFICE O/P EST LOW 20 MIN: CPT | Mod: S$GLB,,, | Performed by: PHYSICIAN ASSISTANT

## 2020-02-04 PROCEDURE — 99999 PR PBB SHADOW E&M-EST. PATIENT-LVL III: CPT | Mod: PBBFAC,,, | Performed by: PHYSICIAN ASSISTANT

## 2020-02-04 PROCEDURE — 99999 PR PBB SHADOW E&M-EST. PATIENT-LVL III: ICD-10-PCS | Mod: PBBFAC,,, | Performed by: PHYSICIAN ASSISTANT

## 2020-02-04 PROCEDURE — 73564 X-RAY EXAM KNEE 4 OR MORE: CPT | Mod: 26,50,, | Performed by: RADIOLOGY

## 2020-02-04 PROCEDURE — 99213 PR OFFICE/OUTPT VISIT, EST, LEVL III, 20-29 MIN: ICD-10-PCS | Mod: S$GLB,,, | Performed by: PHYSICIAN ASSISTANT

## 2020-02-04 NOTE — PROGRESS NOTES
Subjective:          Chief Complaint: Camila Black is a 49 y.o. female who had concerns including Pain of the Right Knee.    Patient is here for a follow up for her right knee 2.5 years s/p right TKA. No pain today. She is doing very well. Does report stiffness during weather changes and long car rides. 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  Replacement, Knee, Medial and Lateral compartment (Total Knee) 24203, Complex  2.  Medial Collateral Ligament Reconstruction, (Extra-articular), 23114  Complexity of the case was increased due to the patient's BMI / body morphology, chronic nature of the problem and the degree of preoperative genu valgum noted.       Pain   Pertinent negatives include no abdominal pain, chest pain, chills, congestion, coughing, fever, headaches, joint swelling, myalgias, nausea, numbness, rash, sore throat or vomiting.          Review of Systems   Constitution: Negative. Negative for chills, fever, weight gain and weight loss.   HENT: Negative for congestion and sore throat.    Eyes: Negative for blurred vision and double vision.   Cardiovascular: Negative for chest pain, leg swelling and palpitations.   Respiratory: Negative for cough and shortness of breath.    Skin: Negative for itching, poor wound healing and rash.   Musculoskeletal: Positive for stiffness. Negative for back pain, joint pain, joint swelling, muscle weakness and myalgias.   Gastrointestinal: Negative for abdominal pain, constipation, diarrhea, nausea and  vomiting.   Genitourinary: Negative.  Negative for frequency and hematuria.   Neurological: Negative for dizziness, headaches, numbness, paresthesias and sensory change.   Psychiatric/Behavioral: Negative for altered mental status and depression. The patient is not nervous/anxious.    Allergic/Immunologic: Negative for hives.       Pain Related Questions  Over the past 3 days, what was your average pain during activity? (I.e. running, jogging, walking, climbing stairs, getting dressed, ect.): 0  Over the past 3 days, what was your highest pain level?: 0  Over the past 3 days, what was your lowest pain level? : 0    Other  How many nights a week are you awakened by your affected body part?: 0  Was the patient's HEIGHT measured or patient reported?: Patient Reported  Was the patient's WEIGHT measured or patient reported?: Measured      Objective:        General: Camila is well-developed, well-nourished, appears stated age, in no acute distress, alert and oriented to time, place and person.     General    Vitals reviewed.  Constitutional: She is oriented to person, place, and time. She appears well-developed and well-nourished. No distress.   HENT:   Head: Normocephalic and atraumatic.   Mouth/Throat: No oropharyngeal exudate.   Eyes: EOM are normal. Right eye exhibits no discharge. Left eye exhibits no discharge.   Neck: Normal range of motion.   Cardiovascular: Normal rate and regular rhythm.    Pulmonary/Chest: Effort normal and breath sounds normal. No respiratory distress.   Neurological: She is alert and oriented to person, place, and time. She has normal reflexes. No cranial nerve deficit. Coordination normal.   Psychiatric: She has a normal mood and affect. Her behavior is normal. Judgment and thought content normal.     General Musculoskeletal Exam   Gait: abnormal       Right Knee Exam     Inspection   Erythema: absent  Scars: present  Swelling: absent  Effusion: absent  Deformity: absent  Bruising:  absent    Tenderness   The patient is experiencing no tenderness.     Range of Motion   Extension: -20   Flexion:  100 abnormal     Tests   Meniscus   Pedro:  Medial - negative Lateral - negative  Ligament Examination Lachman: normal (-1 to 2mm) PCL-Posterior Drawer: normal (0 to 2mm)     MCL - Valgus: normal (0 to 2mm)  LCL - Varus: normalPivot Shift: normal (Equal)Reverse Pivot Shift: normal (Equal)Dial Test at 30 degrees: normal (< 5 degrees)Dial Test at 90 degrees: normal (< 5 degrees)  Posterior Sag Test: negative  Posterolateral Corner: unstable (>15 degrees difference)  Patella   Patellar apprehension: negative  Passive Patellar Tilt: neutral  Patellar Tracking: normal  Patellar Glide (quadrants): Lateral - 1   Medial - 2  Q-Angle at 90 degrees: normal  Patellar Grind: negative  J-Sign: none    Other   Meniscal Cyst: absent  Popliteal (Baker's) Cyst: absent  Sensation: normal    Left Knee Exam     Inspection   Erythema: absent  Scars: absent  Swelling: absent  Effusion: absent  Deformity: absent  Bruising: absent    Tenderness   The patient is experiencing no tenderness.     Range of Motion   Extension: -20   Left knee flexion: 105.     Tests   Meniscus   Pedro:  Medial - negative Lateral - negative  Stability Lachman: normal (-1 to 2mm) PCL-Posterior Drawer: normal (0 to 2mm)  MCL - Valgus: normal (0 to 2mm)  LCL - Varus: normal (0 to 2mm)Pivot Shift: normal (Equal)Reverse Pivot Shift: normal (Equal)Dial Test at 30 degrees: normal (< 5 degrees)Dial Test at 90 degrees: normal (< 5 degrees)  Posterior Sag Test: negative  Posterolateral Corner: unstable (>15 degrees difference)  Patella   Patellar apprehension: negative  Passive Patellar Tilt: neutral  Patellar Tracking: normal  Patellar Glide (Quadrants): Lateral - 1 Medial - 2  Q-Angle at 90 degrees: normal  Patellar Grind: positive  J-Sign: J sign absent    Other   Meniscal Cyst: absent  Popliteal (Baker's) Cyst: absent  Sensation: normal    Right Hip  Exam     Tests   Venkata: negative  Left Hip Exam     Tests   Venkata: negative          Muscle Strength   Right Lower Extremity   Hip Abduction: 4/5   Quadriceps:  4/5   Hamstring:  3/5   Left Lower Extremity   Hip Abduction: 4/5   Quadriceps:  4/5   Hamstring:  3/5     Reflexes     Left Side  Quadriceps:  2+  Achilles:  2+    Right Side   Quadriceps:  2+  Achilles:  2+    Vascular Exam     Right Pulses  Dorsalis Pedis:      2+  Posterior Tibial:      2+        Left Pulses  Dorsalis Pedis:      2+  Posterior Tibial:      2+        Edema  Right Lower Leg: absent  Left Lower Leg: absent    RADIOGRAPHS TODAY  Radiographs ordered and reviewed today in clinic of the bilateral knee demonstrates right TKA with hardware in good position with medial screws for MCL reconstruction, medial and patellofemoral compartment wear on the left knee.     There is a right TKA in place good alignment no complication and a mild valgus deformity.    Left knee: There is moderate DJD.  No fracture dislocation bone destruction or OCD seen.            Assessment:       Encounter Diagnoses   Name Primary?    Status post total right knee replacement Yes    Right knee pain, unspecified chronicity     Morbid obesity with BMI of 40.0-44.9, adult           Plan:       1. IKDC, SF-12 and KOOS was filled out today in clinic.     RTC in 1 years with Mid-level provider. Patient will fill out IKDC, SF-12 and KOOS.     2. HEP 54703 - Nasim Leal PA-C, instructed and demonstrated a patellofemoral HEP. The patient then demonstrated understanding of exercises and proper technique. This program was performed for 15 minutes.     3. Celebrex 200 mg twice daily PRN for pain management.    4. Ice compress to the affected area 2-3x a day for 15-20 minutes as needed for pain management.    5. Prophylactic Augmentin 875-125mg for future Dental procedures.      All of the patient's questions were answered and the patient will contact us if they have any questions  or concerns in the interim.                                          Patient questionnaires may have been collected.

## 2020-03-16 ENCOUNTER — IMMUNIZATION (OUTPATIENT)
Dept: PHARMACY | Facility: CLINIC | Age: 50
End: 2020-03-16
Payer: COMMERCIAL

## 2020-03-16 ENCOUNTER — OFFICE VISIT (OUTPATIENT)
Dept: INTERNAL MEDICINE | Facility: CLINIC | Age: 50
End: 2020-03-16
Payer: COMMERCIAL

## 2020-03-16 ENCOUNTER — TELEPHONE (OUTPATIENT)
Dept: INTERNAL MEDICINE | Facility: CLINIC | Age: 50
End: 2020-03-16

## 2020-03-16 ENCOUNTER — LAB VISIT (OUTPATIENT)
Dept: LAB | Facility: HOSPITAL | Age: 50
End: 2020-03-16
Attending: INTERNAL MEDICINE
Payer: COMMERCIAL

## 2020-03-16 ENCOUNTER — PATIENT MESSAGE (OUTPATIENT)
Dept: INTERNAL MEDICINE | Facility: CLINIC | Age: 50
End: 2020-03-16

## 2020-03-16 VITALS
DIASTOLIC BLOOD PRESSURE: 78 MMHG | HEART RATE: 76 BPM | BODY MASS INDEX: 43.26 KG/M2 | HEIGHT: 66 IN | SYSTOLIC BLOOD PRESSURE: 122 MMHG | WEIGHT: 269.19 LBS

## 2020-03-16 DIAGNOSIS — E61.1 IRON DEFICIENCY: ICD-10-CM

## 2020-03-16 DIAGNOSIS — Z00.00 ANNUAL PHYSICAL EXAM: ICD-10-CM

## 2020-03-16 DIAGNOSIS — D23.4 BENIGN NEOPLASM OF SCALP AND SKIN OF NECK: ICD-10-CM

## 2020-03-16 DIAGNOSIS — Z12.11 COLON CANCER SCREENING: ICD-10-CM

## 2020-03-16 DIAGNOSIS — E55.9 MILD VITAMIN D DEFICIENCY: Primary | ICD-10-CM

## 2020-03-16 DIAGNOSIS — Z12.4 ENCOUNTER FOR PAPANICOLAOU SMEAR FOR CERVICAL CANCER SCREENING: ICD-10-CM

## 2020-03-16 DIAGNOSIS — E66.01 MORBID OBESITY WITH BMI OF 40.0-44.9, ADULT: ICD-10-CM

## 2020-03-16 DIAGNOSIS — I10 ESSENTIAL HYPERTENSION: ICD-10-CM

## 2020-03-16 DIAGNOSIS — Z00.00 ANNUAL PHYSICAL EXAM: Primary | ICD-10-CM

## 2020-03-16 DIAGNOSIS — E53.8 B12 DEFICIENCY: ICD-10-CM

## 2020-03-16 DIAGNOSIS — E55.9 MILD VITAMIN D DEFICIENCY: ICD-10-CM

## 2020-03-16 DIAGNOSIS — Z98.84 S/P GASTRIC BYPASS: ICD-10-CM

## 2020-03-16 LAB
25(OH)D3+25(OH)D2 SERPL-MCNC: 18 NG/ML (ref 30–96)
ALBUMIN SERPL BCP-MCNC: 3.6 G/DL (ref 3.5–5.2)
ALP SERPL-CCNC: 76 U/L (ref 55–135)
ALT SERPL W/O P-5'-P-CCNC: 11 U/L (ref 10–44)
ANION GAP SERPL CALC-SCNC: 9 MMOL/L (ref 8–16)
AST SERPL-CCNC: 19 U/L (ref 10–40)
BASOPHILS # BLD AUTO: 0.03 K/UL (ref 0–0.2)
BASOPHILS NFR BLD: 0.5 % (ref 0–1.9)
BILIRUB SERPL-MCNC: 0.5 MG/DL (ref 0.1–1)
BUN SERPL-MCNC: 9 MG/DL (ref 6–20)
CALCIUM SERPL-MCNC: 9 MG/DL (ref 8.7–10.5)
CHLORIDE SERPL-SCNC: 106 MMOL/L (ref 95–110)
CHOLEST SERPL-MCNC: 168 MG/DL (ref 120–199)
CHOLEST/HDLC SERPL: 3.5 {RATIO} (ref 2–5)
CO2 SERPL-SCNC: 27 MMOL/L (ref 23–29)
CREAT SERPL-MCNC: 0.7 MG/DL (ref 0.5–1.4)
DIFFERENTIAL METHOD: ABNORMAL
EOSINOPHIL # BLD AUTO: 0.1 K/UL (ref 0–0.5)
EOSINOPHIL NFR BLD: 1.7 % (ref 0–8)
ERYTHROCYTE [DISTWIDTH] IN BLOOD BY AUTOMATED COUNT: 14.6 % (ref 11.5–14.5)
EST. GFR  (AFRICAN AMERICAN): >60 ML/MIN/1.73 M^2
EST. GFR  (NON AFRICAN AMERICAN): >60 ML/MIN/1.73 M^2
FERRITIN SERPL-MCNC: 27 NG/ML (ref 20–300)
GLUCOSE SERPL-MCNC: 80 MG/DL (ref 70–110)
HCT VFR BLD AUTO: 42.3 % (ref 37–48.5)
HDLC SERPL-MCNC: 48 MG/DL (ref 40–75)
HDLC SERPL: 28.6 % (ref 20–50)
HGB BLD-MCNC: 12.6 G/DL (ref 12–16)
IMM GRANULOCYTES # BLD AUTO: 0.01 K/UL (ref 0–0.04)
IMM GRANULOCYTES NFR BLD AUTO: 0.2 % (ref 0–0.5)
IRON SERPL-MCNC: 40 UG/DL (ref 30–160)
LDLC SERPL CALC-MCNC: 110.2 MG/DL (ref 63–159)
LYMPHOCYTES # BLD AUTO: 2.4 K/UL (ref 1–4.8)
LYMPHOCYTES NFR BLD: 40.6 % (ref 18–48)
MCH RBC QN AUTO: 29 PG (ref 27–31)
MCHC RBC AUTO-ENTMCNC: 29.8 G/DL (ref 32–36)
MCV RBC AUTO: 97 FL (ref 82–98)
MONOCYTES # BLD AUTO: 0.5 K/UL (ref 0.3–1)
MONOCYTES NFR BLD: 8.3 % (ref 4–15)
NEUTROPHILS # BLD AUTO: 2.8 K/UL (ref 1.8–7.7)
NEUTROPHILS NFR BLD: 48.7 % (ref 38–73)
NONHDLC SERPL-MCNC: 120 MG/DL
NRBC BLD-RTO: 0 /100 WBC
PLATELET # BLD AUTO: 304 K/UL (ref 150–350)
PMV BLD AUTO: 10.1 FL (ref 9.2–12.9)
POTASSIUM SERPL-SCNC: 3.8 MMOL/L (ref 3.5–5.1)
PROT SERPL-MCNC: 7.6 G/DL (ref 6–8.4)
RBC # BLD AUTO: 4.35 M/UL (ref 4–5.4)
SATURATED IRON: 11 % (ref 20–50)
SODIUM SERPL-SCNC: 142 MMOL/L (ref 136–145)
TOTAL IRON BINDING CAPACITY: 355 UG/DL (ref 250–450)
TRANSFERRIN SERPL-MCNC: 240 MG/DL (ref 200–375)
TRIGL SERPL-MCNC: 49 MG/DL (ref 30–150)
TSH SERPL DL<=0.005 MIU/L-ACNC: 0.91 UIU/ML (ref 0.4–4)
VIT B12 SERPL-MCNC: >2000 PG/ML (ref 210–950)
WBC # BLD AUTO: 5.81 K/UL (ref 3.9–12.7)

## 2020-03-16 PROCEDURE — 99396 PR PREVENTIVE VISIT,EST,40-64: ICD-10-PCS | Mod: S$GLB,,, | Performed by: INTERNAL MEDICINE

## 2020-03-16 PROCEDURE — 80053 COMPREHEN METABOLIC PANEL: CPT

## 2020-03-16 PROCEDURE — 82306 VITAMIN D 25 HYDROXY: CPT

## 2020-03-16 PROCEDURE — 99396 PREV VISIT EST AGE 40-64: CPT | Mod: S$GLB,,, | Performed by: INTERNAL MEDICINE

## 2020-03-16 PROCEDURE — 99999 PR PBB SHADOW E&M-EST. PATIENT-LVL IV: CPT | Mod: PBBFAC,,, | Performed by: INTERNAL MEDICINE

## 2020-03-16 PROCEDURE — 99999 PR PBB SHADOW E&M-EST. PATIENT-LVL IV: ICD-10-PCS | Mod: PBBFAC,,, | Performed by: INTERNAL MEDICINE

## 2020-03-16 PROCEDURE — 83540 ASSAY OF IRON: CPT

## 2020-03-16 PROCEDURE — 36415 COLL VENOUS BLD VENIPUNCTURE: CPT

## 2020-03-16 PROCEDURE — 82728 ASSAY OF FERRITIN: CPT

## 2020-03-16 PROCEDURE — 84443 ASSAY THYROID STIM HORMONE: CPT

## 2020-03-16 PROCEDURE — 80061 LIPID PANEL: CPT

## 2020-03-16 PROCEDURE — 85025 COMPLETE CBC W/AUTO DIFF WBC: CPT

## 2020-03-16 PROCEDURE — 82607 VITAMIN B-12: CPT

## 2020-03-16 RX ORDER — VIT C/E/ZN/COPPR/LUTEIN/ZEAXAN 250MG-90MG
2000 CAPSULE ORAL DAILY
Qty: 60 CAPSULE | Refills: 12 | Status: SHIPPED | OUTPATIENT
Start: 2020-03-16 | End: 2023-05-31

## 2020-03-16 RX ORDER — ERGOCALCIFEROL 1.25 MG/1
50000 CAPSULE ORAL
Qty: 12 CAPSULE | Refills: 3 | Status: SHIPPED | OUTPATIENT
Start: 2020-03-16 | End: 2022-03-04 | Stop reason: SDUPTHER

## 2020-03-16 RX ORDER — FERROUS SULFATE 324(65)MG
325 TABLET, DELAYED RELEASE (ENTERIC COATED) ORAL 2 TIMES DAILY
Qty: 60 TABLET | Refills: 12 | COMMUNITY
Start: 2020-03-16 | End: 2022-03-16 | Stop reason: SDUPTHER

## 2020-03-16 NOTE — PATIENT INSTRUCTIONS
Prevention Guidelines, Women Ages 50 to 64  Screening tests and vaccines are an important part of managing your health. Health counseling is essential, too. Below are guidelines for these, for women ages 50 to 64. Talk with your healthcare provider to make sure youre up to date on what you need.  Screening Who needs it How often   Type 2 diabetes or prediabetes All adults beginning at age 45 and adults without symptoms at any age who are overweight or obese and have 1 or more additional risk factors for diabetes. At  least every 3 years   Alcohol misuse All women in this age group At routine exams   Blood pressure All women in this age group Every 2 years if your blood pressure is less than 120/80 mm Hg; yearly if your systolic blood pressure is 120 to 139 mm Hg, or your diastolic blood pressure reading is 80 to 89 mm Hg   Breast cancer All women in this age group Yearly mammogram and clinical breast exam1   Cervical cancer All women in this age group, except women who have had a complete hysterectomy Pap test every 3 years or Pap test with human papillomavirus (HPV) test every 5 years   Chlamydia Women at increased risk for infection At routine exams   Colorectal cancer All women in this age group Flexible sigmoidoscopy every 5 years, or colonoscopy every 10 years, or double-contrast barium enema every 5 years; yearly fecal occult blood test or fecal immunochemical test; or a stool DNA test as often as your health care provider advises; talk with your health care provider about which tests are best for you   Depression All women in this age group At routine exams   Gonorrhea Sexually active women at increased risk for infection At routine exams   Hepatitis C Anyone at increased risk; 1 time for those born between 1945 and 1965 At routine exams   High cholesterol or triglycerides All women in this age group who are at risk for coronary artery disease At least every 5 years   HIV All women At routine exams   Lung  cancer Adults age 55 to 80 who have smoked Yearly screening in smokers with 30 pack-year history of smoking or who quit within 15 years   Obesity All women in this age group At routine exams   Osteoporosis Women who are postmenopausal Ask your healthcare provider   Syphilis Women at increased risk for infection - talk with your healthcare provider At routine exams   Tuberculosis Women at increased risk for infection - talk with your healthcare provider Ask your healthcare provider   Vision All women in this age group Ask your healthcare provider   Vaccine Who needs it How often   Chickenpox (varicella) All women in this age group who have no record of this infection or vaccine 2 doses; the second dose should be given at least 4 weeks after the first dose   Hepatitis A Women at increased risk for infection - talk with your healthcare provider 2 doses given at least 6 months apart   Hepatitis B Women at increased risk for infection - talk with your healthcare provider 3 doses over 6 months; second dose should be given 1 month after the first dose; the third dose should be given at least 2 months after the second dose and at least 4 months after the first dose   Haemophilus influenzaeType B (HIB) Women at increased risk for infection - talk with your healthcare provider 1 to 3 doses   Influenza (flu) All women in this age group Once a year   Measles, mumps, rubella (MMR) Women in this age group through their late 50s who have no record of these infections or vaccines 1 dose   Meningococcal Women at increased risk for infection - talk with your healthcare provider 1 or more doses   Pneumococcal conjugate vaccine (PCV13) and pneumococcal polysaccharide vaccine (PPSV23) Women at increased risk for infection - talk with your healthcare provider PCV13: 1 dose ages 19 to 65 (protects against 13 types of pneumococcal bacteria)  PPSV23: 1 to 2 doses through age 64, or 1 dose at 65 or older (protects against 23 types of  pneumococcal bacteria)   Tetanus/diphtheria/pertussis (Td/Tdap) booster All women in this age group Td every 10 years, or a one-time dose of Tdap instead of a Td booster after age 18, then Td every 10 years   Zoster All women ages 60 and older 1 dose   Counseling Who needs it How often   BRCA gene mutation testing for breast and ovarian cancer susceptibility Women with increased risk for having gene mutation When your risk is known   Breast cancer and chemoprevention Women at high risk for breast cancer When your risk is known   Diet and exercise Women who are overweight or obese When diagnosed, and then at routine exams   Sexually transmitted infection prevention Women at increased risk for infection - talk with your healthcare provider At routine exams   Use of daily aspirin Women ages 55 and up in this age group who are at risk for cardiovascular health problems such as stroke When your risk is known   Use of tobacco and the health effects it can cause All women in this age group Every exam   1American [employer]  Date Last Reviewed: 1/26/2016  © 5823-0248 [employer]. [address] [place] [address], [place] [address]. All rights reserved. This information is not intended as a substitute for professional medical care. Always follow your healthcare professional's instructions.

## 2020-03-16 NOTE — PROGRESS NOTES
Subjective:       Patient ID: Camila Black is a 50 y.o. female.    Chief Complaint: Annual Exam    Annual exam    Recall 2017 R TKR     2018 L tendon surgery; ongoing orthopedic issues, working on this.     Working hard to lose weight, has made some progress- challenging due to not being able to exercise.      LMP around 2018; had an ablation so she is not sure.  Some hot flashes slightly better at this time.     Overdue for Dermatology follow-up.    Review of Systems   Constitutional: Negative for activity change and unexpected weight change.   HENT: Negative for hearing loss, rhinorrhea and trouble swallowing.    Eyes: Negative for discharge and visual disturbance.   Respiratory: Negative for chest tightness and wheezing.    Cardiovascular: Negative for chest pain and palpitations.   Gastrointestinal: Negative for blood in stool, constipation, diarrhea and vomiting.   Endocrine: Negative for polydipsia and polyuria.   Genitourinary: Negative for difficulty urinating, dysuria, hematuria and menstrual problem.   Musculoskeletal: Positive for arthralgias. Negative for joint swelling and neck pain.        See above, ongoing arthralgias   Neurological: Negative for weakness and headaches.   Psychiatric/Behavioral: Negative for confusion and dysphoric mood.       Objective:      Physical Exam   Constitutional: She is oriented to person, place, and time. She appears well-developed and well-nourished.   HENT:   Head: Normocephalic and atraumatic.   Right Ear: External ear normal.   Left Ear: External ear normal.   Nose: Nose normal.   Mouth/Throat: Oropharynx is clear and moist. No oropharyngeal exudate.   Eyes: Conjunctivae and EOM are normal. No scleral icterus.   Neck: Normal range of motion. Neck supple. No JVD present. No thyromegaly present.   Cardiovascular: Normal rate, regular rhythm, normal heart sounds and intact distal pulses. Exam reveals no gallop.   No murmur heard.  Pulmonary/Chest: Effort  normal and breath sounds normal. No respiratory distress. She has no wheezes.   Abdominal: Soft. Bowel sounds are normal. She exhibits no distension and no mass. There is no tenderness. There is no rebound and no guarding.   Musculoskeletal: Normal range of motion. She exhibits no edema or tenderness.   Lymphadenopathy:     She has no cervical adenopathy.   Neurological: She is alert and oriented to person, place, and time. She displays normal reflexes. No cranial nerve deficit. Coordination normal.   Skin: Skin is warm. No rash noted. No erythema.   Psychiatric: She has a normal mood and affect. Her behavior is normal. Judgment and thought content normal.   Nursing note and vitals reviewed.      Assessment:       1. Annual physical exam    2. Benign neoplasm of scalp and skin of neck    3. Essential hypertension    4. B12 deficiency    5. Gastric bypass 2006    6. Iron deficiency    7. Mild vitamin D deficiency    8. Colon cancer screening    9. Encounter for Papanicolaou smear for cervical cancer screening    10. Morbid obesity with BMI of 40.0-44.9, adult        Plan:     Camila was seen today for annual exam.    Diagnoses and all orders for this visit:    Annual physical exam  -     CBC auto differential; Future  -     Comprehensive metabolic panel; Future  -     TSH; Future  -     Lipid panel; Future    Benign neoplasm of scalp and skin of neck  -     Ambulatory referral/consult to Dermatology; Future    Essential hypertension    B12 deficiency  -     Vitamin B12; Future    Gastric bypass 2006    Iron deficiency  -     Ferritin; Future  -     Iron and TIBC; Future    Mild vitamin D deficiency  -     Vitamin D; Future    Colon cancer screening  -     Fecal Immunochemical Test (iFOBT); Future    Encounter for Papanicolaou smear for cervical cancer screening  -     Ambulatory referral/consult to Gynecology; Future    Morbid obesity with BMI of 40.0-44.9, adult; continue to work on exercise, lifestyle  modification and weight loss.  Does not feel that sleep apnea is an issue.  Reviewed.  Consideration of further assessment pending these results.  She does not want to pursue dietitian or bariatric follow-up.    Shingles vaccine # 1 today  Schedule Dermatology and Gyn  Schedule mammogram  Fit kit  I will review all studies and determine further tx depending on findings

## 2020-04-05 ENCOUNTER — PATIENT MESSAGE (OUTPATIENT)
Dept: INTERNAL MEDICINE | Facility: CLINIC | Age: 50
End: 2020-04-05

## 2020-04-06 RX ORDER — AMOXICILLIN AND CLAVULANATE POTASSIUM 875; 125 MG/1; MG/1
1 TABLET, FILM COATED ORAL 2 TIMES DAILY
Qty: 14 TABLET | Refills: 0 | Status: SHIPPED | OUTPATIENT
Start: 2020-04-06 | End: 2020-08-21

## 2020-04-16 ENCOUNTER — PATIENT MESSAGE (OUTPATIENT)
Dept: PHARMACY | Facility: CLINIC | Age: 50
End: 2020-04-16

## 2020-05-12 DIAGNOSIS — M25.561 ACUTE PAIN OF RIGHT KNEE: ICD-10-CM

## 2020-05-12 RX ORDER — CELECOXIB 200 MG/1
CAPSULE ORAL
Qty: 60 CAPSULE | Refills: 2 | Status: SHIPPED | OUTPATIENT
Start: 2020-05-12 | End: 2020-07-23 | Stop reason: SDUPTHER

## 2020-06-17 ENCOUNTER — IMMUNIZATION (OUTPATIENT)
Dept: PHARMACY | Facility: CLINIC | Age: 50
End: 2020-06-17
Payer: COMMERCIAL

## 2020-06-17 ENCOUNTER — LAB VISIT (OUTPATIENT)
Dept: LAB | Facility: HOSPITAL | Age: 50
End: 2020-06-17
Attending: INTERNAL MEDICINE
Payer: COMMERCIAL

## 2020-06-17 DIAGNOSIS — E55.9 MILD VITAMIN D DEFICIENCY: ICD-10-CM

## 2020-06-17 DIAGNOSIS — E61.1 IRON DEFICIENCY: ICD-10-CM

## 2020-06-17 LAB
25(OH)D3+25(OH)D2 SERPL-MCNC: 20 NG/ML (ref 30–96)
BASOPHILS # BLD AUTO: 0.05 K/UL (ref 0–0.2)
BASOPHILS NFR BLD: 0.8 % (ref 0–1.9)
DIFFERENTIAL METHOD: ABNORMAL
EOSINOPHIL # BLD AUTO: 0.1 K/UL (ref 0–0.5)
EOSINOPHIL NFR BLD: 2 % (ref 0–8)
ERYTHROCYTE [DISTWIDTH] IN BLOOD BY AUTOMATED COUNT: 14.4 % (ref 11.5–14.5)
FERRITIN SERPL-MCNC: 38 NG/ML (ref 20–300)
HCT VFR BLD AUTO: 35.7 % (ref 37–48.5)
HGB BLD-MCNC: 10.8 G/DL (ref 12–16)
IMM GRANULOCYTES # BLD AUTO: 0.01 K/UL (ref 0–0.04)
IMM GRANULOCYTES NFR BLD AUTO: 0.2 % (ref 0–0.5)
IRON SERPL-MCNC: 83 UG/DL (ref 30–160)
LYMPHOCYTES # BLD AUTO: 3 K/UL (ref 1–4.8)
LYMPHOCYTES NFR BLD: 49.8 % (ref 18–48)
MCH RBC QN AUTO: 29.7 PG (ref 27–31)
MCHC RBC AUTO-ENTMCNC: 30.3 G/DL (ref 32–36)
MCV RBC AUTO: 98 FL (ref 82–98)
MONOCYTES # BLD AUTO: 0.5 K/UL (ref 0.3–1)
MONOCYTES NFR BLD: 7.6 % (ref 4–15)
NEUTROPHILS # BLD AUTO: 2.4 K/UL (ref 1.8–7.7)
NEUTROPHILS NFR BLD: 39.6 % (ref 38–73)
NRBC BLD-RTO: 0 /100 WBC
PLATELET # BLD AUTO: 264 K/UL (ref 150–350)
PMV BLD AUTO: 9.7 FL (ref 9.2–12.9)
RBC # BLD AUTO: 3.64 M/UL (ref 4–5.4)
SATURATED IRON: 22 % (ref 20–50)
TOTAL IRON BINDING CAPACITY: 383 UG/DL (ref 250–450)
TRANSFERRIN SERPL-MCNC: 259 MG/DL (ref 200–375)
WBC # BLD AUTO: 6.07 K/UL (ref 3.9–12.7)

## 2020-06-17 PROCEDURE — 36415 COLL VENOUS BLD VENIPUNCTURE: CPT

## 2020-06-17 PROCEDURE — 85025 COMPLETE CBC W/AUTO DIFF WBC: CPT

## 2020-06-17 PROCEDURE — 82306 VITAMIN D 25 HYDROXY: CPT

## 2020-06-17 PROCEDURE — 83540 ASSAY OF IRON: CPT

## 2020-06-17 PROCEDURE — 82728 ASSAY OF FERRITIN: CPT

## 2020-07-21 ENCOUNTER — LAB VISIT (OUTPATIENT)
Dept: LAB | Facility: HOSPITAL | Age: 50
End: 2020-07-21
Attending: INTERNAL MEDICINE
Payer: COMMERCIAL

## 2020-07-21 DIAGNOSIS — Z12.11 COLON CANCER SCREENING: ICD-10-CM

## 2020-07-21 PROCEDURE — 82274 ASSAY TEST FOR BLOOD FECAL: CPT

## 2020-07-24 ENCOUNTER — TELEPHONE (OUTPATIENT)
Dept: INTERNAL MEDICINE | Facility: CLINIC | Age: 50
End: 2020-07-24

## 2020-07-24 LAB — HEMOCCULT STL QL IA: NEGATIVE

## 2020-07-24 NOTE — TELEPHONE ENCOUNTER
Called pt,  She wants to scheduled her mammogram after reviewing her schedule. # provided [phone].    Azul

## 2020-07-30 LAB — Lab: NORMAL

## 2020-08-21 ENCOUNTER — PATIENT OUTREACH (OUTPATIENT)
Dept: ADMINISTRATIVE | Facility: HOSPITAL | Age: 50
End: 2020-08-21

## 2020-08-21 ENCOUNTER — OFFICE VISIT (OUTPATIENT)
Dept: INTERNAL MEDICINE | Facility: CLINIC | Age: 50
End: 2020-08-21
Payer: COMMERCIAL

## 2020-08-21 ENCOUNTER — TELEPHONE (OUTPATIENT)
Dept: INTERNAL MEDICINE | Facility: CLINIC | Age: 50
End: 2020-08-21

## 2020-08-21 VITALS
WEIGHT: 293 LBS | BODY MASS INDEX: 47.09 KG/M2 | HEIGHT: 66 IN | DIASTOLIC BLOOD PRESSURE: 75 MMHG | SYSTOLIC BLOOD PRESSURE: 120 MMHG

## 2020-08-21 DIAGNOSIS — E55.9 MILD VITAMIN D DEFICIENCY: ICD-10-CM

## 2020-08-21 DIAGNOSIS — Z12.39 BREAST CANCER SCREENING: ICD-10-CM

## 2020-08-21 DIAGNOSIS — E61.1 IRON DEFICIENCY: ICD-10-CM

## 2020-08-21 DIAGNOSIS — E66.01 MORBID OBESITY WITH BMI OF 45.0-49.9, ADULT: ICD-10-CM

## 2020-08-21 DIAGNOSIS — Z01.810 PREOP CARDIOVASCULAR EXAM: Primary | ICD-10-CM

## 2020-08-21 DIAGNOSIS — M25.561 CHRONIC PAIN OF RIGHT KNEE: ICD-10-CM

## 2020-08-21 DIAGNOSIS — I10 ESSENTIAL HYPERTENSION: ICD-10-CM

## 2020-08-21 DIAGNOSIS — G89.29 CHRONIC PAIN OF RIGHT KNEE: ICD-10-CM

## 2020-08-21 DIAGNOSIS — Z98.84 S/P GASTRIC BYPASS: ICD-10-CM

## 2020-08-21 PROCEDURE — 99215 OFFICE O/P EST HI 40 MIN: CPT | Mod: S$GLB,,, | Performed by: INTERNAL MEDICINE

## 2020-08-21 PROCEDURE — 99215 PR OFFICE/OUTPT VISIT, EST, LEVL V, 40-54 MIN: ICD-10-PCS | Mod: S$GLB,,, | Performed by: INTERNAL MEDICINE

## 2020-08-21 PROCEDURE — 99999 PR PBB SHADOW E&M-EST. PATIENT-LVL III: CPT | Mod: PBBFAC,,, | Performed by: INTERNAL MEDICINE

## 2020-08-21 PROCEDURE — 99999 PR PBB SHADOW E&M-EST. PATIENT-LVL III: ICD-10-PCS | Mod: PBBFAC,,, | Performed by: INTERNAL MEDICINE

## 2020-08-21 NOTE — PROGRESS NOTES
Health Maintenance Due   Topic Date Due    Hepatitis C Screening  1970    Mammogram  05/01/2019    Cervical Cancer Screening  07/26/2020     Records request sent to [facility] for patient's most recent mammogram.  Portal message has been sent to patient regarding overdue cervical cancer screening.  Chart review completed.

## 2020-08-21 NOTE — LETTER
August 21, 2020        Rosita Cervantes MD  [address] [place] [id number]             [facility]  [address]  [facility] [address]  Phone: [phone]  Fax: [phone]   Patient: Camila Black   MR Number: [id number]   YOB: 1970   Date of Visit: 8/21/2020       Dear Dr. Cervantes:    Thank you for referring Camila Black to me for evaluation. Attached you will find relevant portions of my assessment and plan of care.    If you have questions, please do not hesitate to call me. I look forward to following Camila Black along with you.    Sincerely,    Tabitha Pandey MD              Enclosure

## 2020-08-21 NOTE — LETTER
AUTHORIZATION FOR RELEASE OF   CONFIDENTIAL INFORMATION    Dear Medical Records Staff,    We are seeing Camila Black, date of birth 1970, in the clinic at [facility]. Tabitha Pandey MD is the patient's PCP. Camila Black has an outstanding lab/procedure at the time we reviewed her chart. In order to help keep her health information updated, she has authorized us to request the following medical record(s):        (X)  MAMMOGRAM                                      (  )  COLONOSCOPY      (  )  PAP SMEAR                                          (  )  OUTSIDE LAB RESULTS     (  )  DEXA SCAN                                          (  )  EYE EXAM            (  )  FOOT EXAM                                          (  )  ENTIRE RECORD     (  )  OUTSIDE IMMUNIZATIONS                 (  )  _______________         Please fax records to [facility], Leslie A. Blake, MD, [phone]     If you have any questions, please contact JOHAN [facility]a at [phone].           Patient Name: Camila Black  : 1970  Patient Phone #: [phone]

## 2020-08-21 NOTE — PROGRESS NOTES
CHIEF COMPLAINT:  The patient is here for preoperative medical optimization prior to surgery.    SURGICAL PROCEDURE: R knee revision    SURGEON: [facility].  Rosita Cervantes    HISTORY OF PRESENT ILLNESS:  The reason for the visit today as a preoperative consultation prior to surgery.    Had a replacement and MCL prior.  Screws are crooked, she says.  Feels unstable.    The patient does not have any active cardiac conditions (does NOT have unstable coronary artery disease, decompensated heart failure, arrhythmia or severe valvular heart disease).    The patient has a limited exercise capacity of greater than 4 METS due to knee pain. She has gained weight.    The patient has no clinical risk factors of prior heart failure, stroke, TIA, renal or hepatic insufficiency.  The patient has no respiratory illness.  Chronic medical issues have been stable.    Labs acceptable other than low D low iron and slight anemia.  She recently resumed vitamins.  No GI bleeding sx.  No GERD.    PAST MEDICAL HISTORY:  Patient Active Problem List   Diagnosis    Mild vitamin D deficiency    Unspecified hypertrophic and atrophic condition of skin    Benign neoplasm of scalp and skin of neck    Varicose veins of lower extremities with other complications    Iron deficiency    Morbid obesity with BMI of 45.0-49.9, adult    S/P endometrial ablation    Primary localized osteoarthrosis, lower leg    ARPIT (obstructive sleep apnea)    Gastroesophageal reflux disease: normal EGD 2/16    Gastric bypass 2006    Gallbladder sludge: see ultrasound 12/15    B12 deficiency    Essential hypertension    Traumatic arthritis of right knee    Status post total right knee replacement     SOCIAL HISTORY:  Never smoked.  Minimal ETOH    FAMILY HISTORY:  Family History   Problem Relation Age of Onset    Hypertension Mother     COPD Mother     Sarcoidosis Mother     Cancer Mother         cervical    Kidney disease Father          Dialysis, transplants    Hypertension Father     Hypertension Sister     Fibromyalgia Sister     Arthritis Sister     Cancer Paternal Grandmother         breast    No Known Problems Son     No Known Problems Son     Cancer Maternal Grandmother         pancreatic    Anesthesia problems Neg Hx     Malignant hypertension Neg Hx     Hypotension Neg Hx     Malignant hyperthermia Neg Hx     Pseudochol deficiency Neg Hx     Acne Neg Hx     Eczema Neg Hx     Lupus Neg Hx     Psoriasis Neg Hx     Melanoma Neg Hx    ,  MEDS AND ALLERGIES: Reviewed/reconciled as per MEDCARD and ALLERGY CARD.    REVIEW OF SYSTEMS:  GENERAL: Feels well  HEENT: No blurred vision, headache or ear pain.  CV: No chest pain, pressure, tightness or shortness of breath.  Respiratory: No cough or wheezing.  Gastrointestinal: No nausea, vomiting or diarrhea  Genitourinary: No dysuria or hematuria  Psych:  Denies depression, SI or HI.  No thought disorder.    PE:  Obese  HEENT: oropharynx clear.  TMs clear.  Neck: No JVD or bruits  Lungs: Clear to auscultation bilaterally  CV: Regular rate and rhythm without murmurs  Abdomen: Soft and nontender with no masses  Extremities: No edema, clubbing or cyanosis; well healed scar R knee  Psych: Alert and oriented x3, no SI or HI.    DATA:  Acceptable labs other than low D low iron and slight anemia    Camila was seen today for pre-op exam.    Diagnoses and all orders for this visit:    Preop cardiovascular exam    Chronic pain of right knee    Essential hypertension:  Doing well, on no medication    Morbid obesity with BMI of 45.0-49.9, adult:  Portion control, exercise as tolerated.  Compliance with sleep apnea.  Consider follow-up with bariatric, she currently declines    Gastric bypass 2006    Iron deficiency: continue tx; recheck in 3 months.  If continues to be iron deficient despite replacement, will need to consider panendoscopy.  She has no current symptoms of concern  -     Ferritin;  Future  -     Iron and TIBC; Future  -     CBC auto differential; Future    Mild vitamin D deficiency:  Recheck in 3 months, on replacement  -     Vitamin D; Future      PLAN:  1. Continue current medications, MEDCARD reconciled with the patient  2. Discontinue NSAIDs and aspirin 5-7 days prior to procedure    PREOPERATIVE RISK ASSESSMENT AND RECOMMENDATIONS:  The patient presents for medical evaluation.  She is undergoing noncardiac surgery which carries a low risk of cardiac complications.    The patient has stable cardiac conditions.  Exercise capacity is fair but limited due to knee pain and obesity.  She has stable clinical risk factors for surgery. She would be considered average risk for surgery.    CLEARANCE:  The patient is medically optimized for surgery.  No further testing or intervention is needed.

## 2020-09-03 ENCOUNTER — PATIENT OUTREACH (OUTPATIENT)
Dept: ADMINISTRATIVE | Facility: HOSPITAL | Age: 50
End: 2020-09-03

## 2020-10-05 ENCOUNTER — PATIENT MESSAGE (OUTPATIENT)
Dept: ADMINISTRATIVE | Facility: HOSPITAL | Age: 50
End: 2020-10-05

## 2021-01-04 ENCOUNTER — PATIENT MESSAGE (OUTPATIENT)
Dept: ADMINISTRATIVE | Facility: HOSPITAL | Age: 51
End: 2021-01-04

## 2021-04-05 ENCOUNTER — PATIENT MESSAGE (OUTPATIENT)
Dept: ADMINISTRATIVE | Facility: HOSPITAL | Age: 51
End: 2021-04-05

## 2021-07-06 ENCOUNTER — PATIENT MESSAGE (OUTPATIENT)
Dept: ADMINISTRATIVE | Facility: HOSPITAL | Age: 51
End: 2021-07-06

## 2021-10-13 DIAGNOSIS — Z12.31 OTHER SCREENING MAMMOGRAM: ICD-10-CM

## 2021-10-27 LAB — BCS RECOMMENDATION EXT: NORMAL

## 2022-01-02 ENCOUNTER — PATIENT MESSAGE (OUTPATIENT)
Dept: ADMINISTRATIVE | Facility: HOSPITAL | Age: 52
End: 2022-01-02
Payer: COMMERCIAL

## 2022-01-18 ENCOUNTER — PATIENT MESSAGE (OUTPATIENT)
Dept: ADMINISTRATIVE | Facility: HOSPITAL | Age: 52
End: 2022-01-18
Payer: COMMERCIAL

## 2022-01-26 DIAGNOSIS — Z12.11 COLON CANCER SCREENING: ICD-10-CM

## 2022-01-31 ENCOUNTER — PATIENT OUTREACH (OUTPATIENT)
Dept: ADMINISTRATIVE | Facility: OTHER | Age: 52
End: 2022-01-31
Payer: COMMERCIAL

## 2022-01-31 NOTE — PROGRESS NOTES
Health Maintenance Due   Topic Date Due    Hepatitis C Screening  Never done    COVID-19 Vaccine (1) Never done    Cervical Cancer Screening  07/26/2020    Colorectal Cancer Screening  07/24/2021    Mammogram  08/04/2021    Influenza Vaccine (1) 09/01/2021     Updates were requested from care everywhere.  Chart was reviewed for overdue Proactive [facility] Encounters (KRYSTAL) topics (CRS, Breast Cancer Screening, Eye exam)  Health Maintenance has been updated.  LINKS immunization registry triggered.  Immunizations were reconciled.

## 2022-02-01 ENCOUNTER — OFFICE VISIT (OUTPATIENT)
Dept: OBSTETRICS AND GYNECOLOGY | Facility: CLINIC | Age: 52
End: 2022-02-01
Payer: COMMERCIAL

## 2022-02-01 VITALS
SYSTOLIC BLOOD PRESSURE: 131 MMHG | HEIGHT: 66 IN | BODY MASS INDEX: 42.8 KG/M2 | WEIGHT: 266.31 LBS | DIASTOLIC BLOOD PRESSURE: 85 MMHG

## 2022-02-01 DIAGNOSIS — Z13.820 OSTEOPOROSIS SCREENING: ICD-10-CM

## 2022-02-01 DIAGNOSIS — Z01.419 ROUTINE GYNECOLOGICAL EXAMINATION: Primary | ICD-10-CM

## 2022-02-01 DIAGNOSIS — Z12.4 CERVICAL CANCER SCREENING: ICD-10-CM

## 2022-02-01 PROCEDURE — 3079F PR MOST RECENT DIASTOLIC BLOOD PRESSURE 80-89 MM HG: ICD-10-PCS | Mod: CPTII,S$GLB,, | Performed by: OBSTETRICS & GYNECOLOGY

## 2022-02-01 PROCEDURE — 3079F DIAST BP 80-89 MM HG: CPT | Mod: CPTII,S$GLB,, | Performed by: OBSTETRICS & GYNECOLOGY

## 2022-02-01 PROCEDURE — 1159F MED LIST DOCD IN RCRD: CPT | Mod: CPTII,S$GLB,, | Performed by: OBSTETRICS & GYNECOLOGY

## 2022-02-01 PROCEDURE — 3075F PR MOST RECENT SYSTOLIC BLOOD PRESS GE 130-139MM HG: ICD-10-PCS | Mod: CPTII,S$GLB,, | Performed by: OBSTETRICS & GYNECOLOGY

## 2022-02-01 PROCEDURE — 99999 PR PBB SHADOW E&M-EST. PATIENT-LVL III: ICD-10-PCS | Mod: PBBFAC,,, | Performed by: OBSTETRICS & GYNECOLOGY

## 2022-02-01 PROCEDURE — 88175 CYTOPATH C/V AUTO FLUID REDO: CPT | Performed by: OBSTETRICS & GYNECOLOGY

## 2022-02-01 PROCEDURE — 99999 PR PBB SHADOW E&M-EST. PATIENT-LVL III: CPT | Mod: PBBFAC,,, | Performed by: OBSTETRICS & GYNECOLOGY

## 2022-02-01 PROCEDURE — 99386 PR PREVENTIVE VISIT,NEW,40-64: ICD-10-PCS | Mod: S$GLB,,, | Performed by: OBSTETRICS & GYNECOLOGY

## 2022-02-01 PROCEDURE — 3008F PR BODY MASS INDEX (BMI) DOCUMENTED: ICD-10-PCS | Mod: CPTII,S$GLB,, | Performed by: OBSTETRICS & GYNECOLOGY

## 2022-02-01 PROCEDURE — 3008F BODY MASS INDEX DOCD: CPT | Mod: CPTII,S$GLB,, | Performed by: OBSTETRICS & GYNECOLOGY

## 2022-02-01 PROCEDURE — 99386 PREV VISIT NEW AGE 40-64: CPT | Mod: S$GLB,,, | Performed by: OBSTETRICS & GYNECOLOGY

## 2022-02-01 PROCEDURE — 87624 HPV HI-RISK TYP POOLED RSLT: CPT | Performed by: OBSTETRICS & GYNECOLOGY

## 2022-02-01 PROCEDURE — 1159F PR MEDICATION LIST DOCUMENTED IN MEDICAL RECORD: ICD-10-PCS | Mod: CPTII,S$GLB,, | Performed by: OBSTETRICS & GYNECOLOGY

## 2022-02-01 PROCEDURE — 3075F SYST BP GE 130 - 139MM HG: CPT | Mod: CPTII,S$GLB,, | Performed by: OBSTETRICS & GYNECOLOGY

## 2022-02-01 NOTE — PROGRESS NOTES
"  50 yo postmenopausal female who presents today for routine gyn visit.  The patient had an endometrial ablation for menorrhagia.  Patient reports no bleeding since last visit here.  No other complaints today.  Last pap was in 2014.  . Declines STD Testing today.    ROS:  GENERAL: Denies weight gain or weight loss. Feeling well overall.   SKIN: Denies rash or lesions.   HEAD: Denies head injury or headache.   CHEST: Denies chest pain or shortness of breath.   CARDIOVASCULAR: Denies palpitations or left sided chest pain.   ABDOMEN: No abdominal pain, constipation, diarrhea, nausea, vomiting or rectal bleeding.   URINARY: No frequency, dysuria, hematuria, or burning on urination.  REPRODUCTIVE: no complaints  BREASTS:  denies pain, lumps, or nipple discharge.   HEMATOLOGIC: No easy bruisability or excessive bleeding.   MUSCULOSKELETAL: Denies joint pain or swelling.   NEUROLOGIC: Denies syncope or weakness.   PSYCHIATRIC: Denies depression, anxiety or mood swings.     PE:   Vitals: /85   Ht 5' 6" (1.676 m)   Wt 120.8 kg (266 lb 5.1 oz)   BMI 42.98 kg/m²   APPEARANCE: Well nourished, well developed, in no acute distress.  SKIN: Normal skin turgor, no lesions.  CHEST: Lungs clear to auscultation.  HEART: Regular rate and rhythm, no murmurs, rubs or gallops.  ABDOMEN: Soft. No tenderness or masses. No hepatosplenomegaly. No hernias. Obese.  BREASTS: Symmetrical, no skin changes or visible lesions. No palpable masses, nipple discharge or adenopathy bilaterally.  PELVIC: Normal external female genitalia without lesions. Normal hair distribution. Adequate perineal body, normal urethral meatus. Vagina moist and well rugated without lesions or discharge. Cervix pink and without lesions. (difficult to see given body habitus) No significant cystocele or rectocele. Bimanual exam showed uterus normal size, shape, position, mobile and nontender. Adnexa without masses or tenderness. Urethra and bladder " normal.  EXTREMITIES: No clubbing cyanosis or edema.      AP  Routine gyn  -s/p normal breast exam: mammogram ordered  -s/p normal pelvic exam:   -Pap collected  -STD testing: declined  -colon cancer screening completed with Dr. Pandey, PCP  -recommend DEXA given postmenopausal and young age    GEOVANY Kaba MD

## 2022-02-02 ENCOUNTER — TELEPHONE (OUTPATIENT)
Dept: ADMINISTRATIVE | Facility: OTHER | Age: 52
End: 2022-02-02
Payer: COMMERCIAL

## 2022-02-03 ENCOUNTER — TELEPHONE (OUTPATIENT)
Dept: ADMINISTRATIVE | Facility: OTHER | Age: 52
End: 2022-02-03
Payer: COMMERCIAL

## 2022-02-05 LAB
HPV HR 12 DNA SPEC QL NAA+PROBE: NEGATIVE
HPV16 AG SPEC QL: NEGATIVE
HPV18 DNA SPEC QL NAA+PROBE: NEGATIVE

## 2022-02-07 LAB
FINAL PATHOLOGIC DIAGNOSIS: NORMAL
Lab: NORMAL

## 2022-02-10 NOTE — PROGRESS NOTES
pap and hpv are normal    Your pelvic exam will still need to occur once a year!    See you then!    Dr brower

## 2022-03-04 ENCOUNTER — OFFICE VISIT (OUTPATIENT)
Dept: INTERNAL MEDICINE | Facility: CLINIC | Age: 52
End: 2022-03-04
Payer: COMMERCIAL

## 2022-03-04 ENCOUNTER — TELEPHONE (OUTPATIENT)
Dept: INTERNAL MEDICINE | Facility: CLINIC | Age: 52
End: 2022-03-04
Payer: COMMERCIAL

## 2022-03-04 ENCOUNTER — PATIENT OUTREACH (OUTPATIENT)
Dept: ADMINISTRATIVE | Facility: HOSPITAL | Age: 52
End: 2022-03-04
Payer: COMMERCIAL

## 2022-03-04 ENCOUNTER — LAB VISIT (OUTPATIENT)
Dept: LAB | Facility: HOSPITAL | Age: 52
End: 2022-03-04
Attending: INTERNAL MEDICINE
Payer: COMMERCIAL

## 2022-03-04 VITALS
BODY MASS INDEX: 43.22 KG/M2 | HEIGHT: 66 IN | DIASTOLIC BLOOD PRESSURE: 70 MMHG | SYSTOLIC BLOOD PRESSURE: 125 MMHG | WEIGHT: 268.94 LBS

## 2022-03-04 DIAGNOSIS — I73.00 RAYNAUD'S PHENOMENON WITHOUT GANGRENE: ICD-10-CM

## 2022-03-04 DIAGNOSIS — K21.9 GASTROESOPHAGEAL REFLUX DISEASE WITHOUT ESOPHAGITIS: ICD-10-CM

## 2022-03-04 DIAGNOSIS — Z00.00 ANNUAL PHYSICAL EXAM: Primary | ICD-10-CM

## 2022-03-04 DIAGNOSIS — C44.41 BASAL CELL CARCINOMA (BCC) OF SCALP: ICD-10-CM

## 2022-03-04 DIAGNOSIS — Z12.11 COLON CANCER SCREENING: ICD-10-CM

## 2022-03-04 DIAGNOSIS — E61.1 IRON DEFICIENCY: ICD-10-CM

## 2022-03-04 DIAGNOSIS — Z98.84 S/P GASTRIC BYPASS: ICD-10-CM

## 2022-03-04 DIAGNOSIS — Z00.00 ANNUAL PHYSICAL EXAM: ICD-10-CM

## 2022-03-04 DIAGNOSIS — I10 ESSENTIAL HYPERTENSION: ICD-10-CM

## 2022-03-04 DIAGNOSIS — E66.01 MORBID OBESITY WITH BMI OF 40.0-44.9, ADULT: ICD-10-CM

## 2022-03-04 DIAGNOSIS — E53.8 B12 DEFICIENCY: ICD-10-CM

## 2022-03-04 LAB
ALBUMIN SERPL BCP-MCNC: 3.4 G/DL (ref 3.5–5.2)
ALP SERPL-CCNC: 83 U/L (ref 55–135)
ALT SERPL W/O P-5'-P-CCNC: 7 U/L (ref 10–44)
ANION GAP SERPL CALC-SCNC: 8 MMOL/L (ref 8–16)
AST SERPL-CCNC: 17 U/L (ref 10–40)
BASOPHILS # BLD AUTO: 0.04 K/UL (ref 0–0.2)
BASOPHILS NFR BLD: 0.9 % (ref 0–1.9)
BILIRUB SERPL-MCNC: 0.7 MG/DL (ref 0.1–1)
BUN SERPL-MCNC: 11 MG/DL (ref 6–20)
CALCIUM SERPL-MCNC: 9.3 MG/DL (ref 8.7–10.5)
CCP AB SER IA-ACNC: 0.6 U/ML
CHLORIDE SERPL-SCNC: 108 MMOL/L (ref 95–110)
CHOLEST SERPL-MCNC: 161 MG/DL (ref 120–199)
CHOLEST/HDLC SERPL: 2.7 {RATIO} (ref 2–5)
CO2 SERPL-SCNC: 27 MMOL/L (ref 23–29)
CREAT SERPL-MCNC: 0.7 MG/DL (ref 0.5–1.4)
CRP SERPL-MCNC: 2.9 MG/L (ref 0–8.2)
DIFFERENTIAL METHOD: ABNORMAL
EOSINOPHIL # BLD AUTO: 0.1 K/UL (ref 0–0.5)
EOSINOPHIL NFR BLD: 1.9 % (ref 0–8)
ERYTHROCYTE [DISTWIDTH] IN BLOOD BY AUTOMATED COUNT: 21.7 % (ref 11.5–14.5)
ERYTHROCYTE [SEDIMENTATION RATE] IN BLOOD BY WESTERGREN METHOD: 36 MM/HR (ref 0–36)
EST. GFR  (AFRICAN AMERICAN): >60 ML/MIN/1.73 M^2
EST. GFR  (NON AFRICAN AMERICAN): >60 ML/MIN/1.73 M^2
ESTIMATED AVG GLUCOSE: 103 MG/DL (ref 68–131)
FERRITIN SERPL-MCNC: 6 NG/ML (ref 20–300)
GLUCOSE SERPL-MCNC: 82 MG/DL (ref 70–110)
HBA1C MFR BLD: 5.2 % (ref 4–5.6)
HCT VFR BLD AUTO: 31 % (ref 37–48.5)
HDLC SERPL-MCNC: 59 MG/DL (ref 40–75)
HDLC SERPL: 36.6 % (ref 20–50)
HGB BLD-MCNC: 9 G/DL (ref 12–16)
IMM GRANULOCYTES # BLD AUTO: 0.01 K/UL (ref 0–0.04)
IMM GRANULOCYTES NFR BLD AUTO: 0.2 % (ref 0–0.5)
IRON SERPL-MCNC: 49 UG/DL (ref 30–160)
LDLC SERPL CALC-MCNC: 90.8 MG/DL (ref 63–159)
LYMPHOCYTES # BLD AUTO: 2.1 K/UL (ref 1–4.8)
LYMPHOCYTES NFR BLD: 43.9 % (ref 18–48)
MCH RBC QN AUTO: 24.6 PG (ref 27–31)
MCHC RBC AUTO-ENTMCNC: 29 G/DL (ref 32–36)
MCV RBC AUTO: 85 FL (ref 82–98)
MONOCYTES # BLD AUTO: 0.4 K/UL (ref 0.3–1)
MONOCYTES NFR BLD: 7.9 % (ref 4–15)
NEUTROPHILS # BLD AUTO: 2.1 K/UL (ref 1.8–7.7)
NEUTROPHILS NFR BLD: 45.2 % (ref 38–73)
NONHDLC SERPL-MCNC: 102 MG/DL
NRBC BLD-RTO: 0 /100 WBC
PLATELET # BLD AUTO: 387 K/UL (ref 150–450)
PMV BLD AUTO: 9.3 FL (ref 9.2–12.9)
POTASSIUM SERPL-SCNC: 4 MMOL/L (ref 3.5–5.1)
PROT SERPL-MCNC: 7.2 G/DL (ref 6–8.4)
RBC # BLD AUTO: 3.66 M/UL (ref 4–5.4)
RHEUMATOID FACT SERPL-ACNC: <13 IU/ML (ref 0–15)
SATURATED IRON: 11 % (ref 20–50)
SODIUM SERPL-SCNC: 143 MMOL/L (ref 136–145)
TOTAL IRON BINDING CAPACITY: 448 UG/DL (ref 250–450)
TRANSFERRIN SERPL-MCNC: 303 MG/DL (ref 200–375)
TRIGL SERPL-MCNC: 56 MG/DL (ref 30–150)
TSH SERPL DL<=0.005 MIU/L-ACNC: 1.19 UIU/ML (ref 0.4–4)
VIT B12 SERPL-MCNC: 627 PG/ML (ref 210–950)
WBC # BLD AUTO: 4.67 K/UL (ref 3.9–12.7)

## 2022-03-04 PROCEDURE — 1159F PR MEDICATION LIST DOCUMENTED IN MEDICAL RECORD: ICD-10-PCS | Mod: CPTII,S$GLB,, | Performed by: INTERNAL MEDICINE

## 2022-03-04 PROCEDURE — 3074F SYST BP LT 130 MM HG: CPT | Mod: CPTII,S$GLB,, | Performed by: INTERNAL MEDICINE

## 2022-03-04 PROCEDURE — 99999 PR PBB SHADOW E&M-EST. PATIENT-LVL IV: CPT | Mod: PBBFAC,,, | Performed by: INTERNAL MEDICINE

## 2022-03-04 PROCEDURE — 85652 RBC SED RATE AUTOMATED: CPT | Performed by: INTERNAL MEDICINE

## 2022-03-04 PROCEDURE — 86200 CCP ANTIBODY: CPT | Performed by: INTERNAL MEDICINE

## 2022-03-04 PROCEDURE — 99396 PREV VISIT EST AGE 40-64: CPT | Mod: S$GLB,,, | Performed by: INTERNAL MEDICINE

## 2022-03-04 PROCEDURE — 84466 ASSAY OF TRANSFERRIN: CPT | Performed by: INTERNAL MEDICINE

## 2022-03-04 PROCEDURE — 3078F DIAST BP <80 MM HG: CPT | Mod: CPTII,S$GLB,, | Performed by: INTERNAL MEDICINE

## 2022-03-04 PROCEDURE — 3074F PR MOST RECENT SYSTOLIC BLOOD PRESSURE < 130 MM HG: ICD-10-PCS | Mod: CPTII,S$GLB,, | Performed by: INTERNAL MEDICINE

## 2022-03-04 PROCEDURE — 3078F PR MOST RECENT DIASTOLIC BLOOD PRESSURE < 80 MM HG: ICD-10-PCS | Mod: CPTII,S$GLB,, | Performed by: INTERNAL MEDICINE

## 2022-03-04 PROCEDURE — 86431 RHEUMATOID FACTOR QUANT: CPT | Performed by: INTERNAL MEDICINE

## 2022-03-04 PROCEDURE — 99396 PR PREVENTIVE VISIT,EST,40-64: ICD-10-PCS | Mod: S$GLB,,, | Performed by: INTERNAL MEDICINE

## 2022-03-04 PROCEDURE — 83036 HEMOGLOBIN GLYCOSYLATED A1C: CPT | Performed by: INTERNAL MEDICINE

## 2022-03-04 PROCEDURE — 3008F BODY MASS INDEX DOCD: CPT | Mod: CPTII,S$GLB,, | Performed by: INTERNAL MEDICINE

## 2022-03-04 PROCEDURE — 1159F MED LIST DOCD IN RCRD: CPT | Mod: CPTII,S$GLB,, | Performed by: INTERNAL MEDICINE

## 2022-03-04 PROCEDURE — 82728 ASSAY OF FERRITIN: CPT | Performed by: INTERNAL MEDICINE

## 2022-03-04 PROCEDURE — 99999 PR PBB SHADOW E&M-EST. PATIENT-LVL IV: ICD-10-PCS | Mod: PBBFAC,,, | Performed by: INTERNAL MEDICINE

## 2022-03-04 PROCEDURE — 36415 COLL VENOUS BLD VENIPUNCTURE: CPT | Performed by: INTERNAL MEDICINE

## 2022-03-04 PROCEDURE — 86140 C-REACTIVE PROTEIN: CPT | Performed by: INTERNAL MEDICINE

## 2022-03-04 PROCEDURE — 3008F PR BODY MASS INDEX (BMI) DOCUMENTED: ICD-10-PCS | Mod: CPTII,S$GLB,, | Performed by: INTERNAL MEDICINE

## 2022-03-04 PROCEDURE — 84630 ASSAY OF ZINC: CPT | Performed by: INTERNAL MEDICINE

## 2022-03-04 PROCEDURE — 84425 ASSAY OF VITAMIN B-1: CPT | Performed by: INTERNAL MEDICINE

## 2022-03-04 PROCEDURE — 86803 HEPATITIS C AB TEST: CPT | Performed by: INTERNAL MEDICINE

## 2022-03-04 PROCEDURE — 85025 COMPLETE CBC W/AUTO DIFF WBC: CPT | Performed by: INTERNAL MEDICINE

## 2022-03-04 PROCEDURE — 80053 COMPREHEN METABOLIC PANEL: CPT | Performed by: INTERNAL MEDICINE

## 2022-03-04 PROCEDURE — 84443 ASSAY THYROID STIM HORMONE: CPT | Performed by: INTERNAL MEDICINE

## 2022-03-04 PROCEDURE — 82607 VITAMIN B-12: CPT | Performed by: INTERNAL MEDICINE

## 2022-03-04 PROCEDURE — 80061 LIPID PANEL: CPT | Performed by: INTERNAL MEDICINE

## 2022-03-04 PROCEDURE — 82525 ASSAY OF COPPER: CPT | Performed by: INTERNAL MEDICINE

## 2022-03-04 PROCEDURE — 86038 ANTINUCLEAR ANTIBODIES: CPT | Performed by: INTERNAL MEDICINE

## 2022-03-04 RX ORDER — ERGOCALCIFEROL 1.25 MG/1
50000 CAPSULE ORAL
Qty: 12 CAPSULE | Refills: 3 | Status: SHIPPED | OUTPATIENT
Start: 2022-03-04 | End: 2023-05-31

## 2022-03-04 NOTE — TELEPHONE ENCOUNTER
Good morning:  She had her mammogram outside of [facility] on October 27, 2021. It is in Care Everywhere.  However, I cannot put it into health maintenance.  Can you please do so?  Thank you

## 2022-03-04 NOTE — PROGRESS NOTES
Subjective:       Patient ID: Camila Black is a 51 y.o. female.    Chief Complaint: Annual Exam    Annual exam    Hd to have revision of her knee surgery, hardware failed.  Finally better    Had mammogram at [place] October 2021.    Due for labs.  Severe anemia 9/20- states in hospital got transfusion.    Due for DERM assessment.    Sometimes when she wakes up she has a sensation of numbness of the face on the right side, it may last an hour or so.  No weakness or slurred speech.  No trouble swallowing.    Sometimes if she sleeps on the right side she wakes up with numbness of the arm and leg.  If she gets up and walks around it it better.    Some Raynaud's.    Has had an eye exam and was told all OK.    Tried to donate blood and was told some liver issue- she got a letter but she did not bring it in.    Patient Active Problem List:     Mild vitamin D deficiency     Unspecified hypertrophic and atrophic condition of skin     Benign neoplasm of scalp and skin of neck     Varicose veins of lower extremities with other complications     Iron deficiency     S/P endometrial ablation     Primary localized osteoarthrosis, lower leg     ARPIT (obstructive sleep apnea)     Gastroesophageal reflux disease: normal EGD 2/16     Gastric bypass 2006     Gallbladder sludge: see ultrasound 12/15     B12 deficiency     Traumatic arthritis of right knee     Status post total right knee replacement     Morbid obesity with BMI of 40.0-44.9, adult     Basal cell carcinoma (BCC) of scalp: 2010      Review of Systems   Constitutional: Negative for activity change, appetite change, chills, fatigue and fever.   HENT: Negative for congestion, hearing loss, sinus pressure and sore throat.    Eyes: Negative for visual disturbance.   Respiratory: Negative for apnea, cough, shortness of breath and wheezing.    Cardiovascular: Negative for chest pain, palpitations and leg swelling.   Gastrointestinal: Negative for abdominal  distention, abdominal pain, constipation, diarrhea, nausea and vomiting.   Genitourinary: Negative for difficulty urinating, dysuria, frequency, hematuria and vaginal bleeding.   Musculoskeletal: Positive for arthralgias. Negative for back pain, gait problem, joint swelling and myalgias.        Raynaud's   Skin: Negative for rash.   Neurological: Positive for numbness. Negative for dizziness, weakness, light-headedness and headaches.        See above   Hematological: Negative for adenopathy. Does not bruise/bleed easily.   Psychiatric/Behavioral: Negative for confusion, hallucinations, sleep disturbance and suicidal ideas.       Objective:      Physical Exam  Vitals and nursing note reviewed.   Constitutional:       Appearance: She is well-developed.   HENT:      Head: Normocephalic and atraumatic.      Right Ear: External ear normal.      Left Ear: External ear normal.      Nose: Nose normal.      Mouth/Throat:      Pharynx: No oropharyngeal exudate.   Eyes:      General: No scleral icterus.     Extraocular Movements: Extraocular movements intact.      Conjunctiva/sclera: Conjunctivae normal.   Neck:      Thyroid: No thyromegaly.      Vascular: No JVD.   Cardiovascular:      Rate and Rhythm: Normal rate and regular rhythm.      Heart sounds: Normal heart sounds. No murmur heard.    No gallop.   Pulmonary:      Effort: Pulmonary effort is normal. No respiratory distress.      Breath sounds: Normal breath sounds. No wheezing.   Abdominal:      General: Bowel sounds are normal. There is no distension.      Palpations: Abdomen is soft. There is no mass.      Tenderness: There is no abdominal tenderness. There is no guarding or rebound.   Musculoskeletal:         General: No tenderness. Normal range of motion.      Cervical back: Normal range of motion and neck supple.   Lymphadenopathy:      Cervical: No cervical adenopathy.   Skin:     General: Skin is warm.      Findings: No erythema or rash.   Neurological:       General: No focal deficit present.      Mental Status: She is alert and oriented to person, place, and time.      Cranial Nerves: No cranial nerve deficit.      Sensory: No sensory deficit.      Motor: No weakness.      Coordination: Coordination normal.   Psychiatric:         Behavior: Behavior normal.         Thought Content: Thought content normal.         Judgment: Judgment normal.         Assessment:       1. Annual physical exam    2. Essential hypertension    3. Gastric bypass 2006    4. B12 deficiency    5. Iron deficiency    6. Gastroesophageal reflux disease: normal EGD 2/16    7. Morbid obesity with BMI of 40.0-44.9, adult    8. Basal cell carcinoma (BCC) of scalp: 2010    9. Colon cancer screening    10. Raynaud's phenomenon without gangrene        Plan:         Camila was seen today for annual exam.    Diagnoses and all orders for this visit:    Annual physical exam  -     CBC Auto Differential; Future  -     Comprehensive Metabolic Panel; Future  -     Lipid Panel; Future  -     Hemoglobin A1C; Future  -     Ferritin; Future  -     Iron and TIBC; Future  -     Hepatitis C Antibody; Future  -     Vitamin B12; Future  -     Vitamin B1; Future  -     TSH; Future    Gastric bypass 2006  -     COPPER, SERUM; Future  -     Zinc; Future    B12 deficiency    Iron deficiency    Gastroesophageal reflux disease: normal EGD 2/16    Morbid obesity with BMI of 40.0-44.9, adult:  Portion control, exercise and weight loss.  She is doing fairly well on her own.  She does not want to pursue any further bariatric follow-up, dietitian or weight loss options.  Denies any apnea symptoms currently    Basal cell carcinoma (BCC) of scalp: 2010  -     Ambulatory referral/consult to Dermatology; Future    Colon cancer screening  -     Case Request Endoscopy: COLONOSCOPY    Raynaud's phenomenon without gangrene  -     STEFAN; Future  -     Cyclic citrul peptide antibody, IgG; Future  -     C-reactive protein; Future  -      Rheumatoid factor; Future  -     Sedimentation rate, manual; Future    Other orders  -     ergocalciferol (ERGOCALCIFEROL) 50,000 unit Cap; Take 1 capsule (50,000 Units total) by mouth every 7 days.      Pneumovax recommended, she will consider  I will review all studies and determine further tx depending on findings

## 2022-03-04 NOTE — PROGRESS NOTES
Health Maintenance Due   Topic Date Due    Hepatitis C Screening  Never done    Pneumococcal Vaccines (Age 0-64) (1 of 4 - PCV13) Never done    Colorectal Cancer Screening  07/24/2021     Triggered LINKS.  Updated Care Everywhere.  Imported outside mammography results into .  Pt has lab appt scheduled for 3/4/2022.  Chart review completed.

## 2022-03-07 LAB
ANA SER QL IF: NORMAL
COPPER SERPL-MCNC: 1143 UG/L (ref 810–1990)
ZINC SERPL-MCNC: 60 UG/DL (ref 60–130)

## 2022-03-09 LAB
HCV AB SERPL QL IA: NEGATIVE
VIT B1 BLD-MCNC: 59 UG/L (ref 38–122)

## 2022-03-15 ENCOUNTER — PATIENT MESSAGE (OUTPATIENT)
Dept: INTERNAL MEDICINE | Facility: CLINIC | Age: 52
End: 2022-03-15
Payer: COMMERCIAL

## 2022-03-15 DIAGNOSIS — E61.1 IRON DEFICIENCY: Primary | ICD-10-CM

## 2022-03-16 RX ORDER — FERROUS SULFATE 324(65)MG
325 TABLET, DELAYED RELEASE (ENTERIC COATED) ORAL 2 TIMES DAILY
Qty: 60 TABLET | Refills: 12 | COMMUNITY
Start: 2022-03-16 | End: 2023-05-31

## 2022-05-30 ENCOUNTER — PATIENT OUTREACH (OUTPATIENT)
Dept: ADMINISTRATIVE | Facility: HOSPITAL | Age: 52
End: 2022-05-30
Payer: COMMERCIAL

## 2022-05-30 ENCOUNTER — PATIENT MESSAGE (OUTPATIENT)
Dept: INTERNAL MEDICINE | Facility: CLINIC | Age: 52
End: 2022-05-30
Payer: COMMERCIAL

## 2022-05-30 ENCOUNTER — PATIENT MESSAGE (OUTPATIENT)
Dept: ADMINISTRATIVE | Facility: HOSPITAL | Age: 52
End: 2022-05-30
Payer: COMMERCIAL

## 2022-05-30 NOTE — PROGRESS NOTES
Health Maintenance Due   Topic Date Due    Pneumococcal Vaccines (Age 0-64) (1 - PCV) Never done    Colorectal Cancer Screening  07/24/2021    COVID-19 Vaccine (3 - Booster for Danial series) 04/21/2022     Triggered LINKS. Updated Care Everywhere. [employer] was given to patient on 5/30/2022 1:02 PM    Chart review completed.

## 2022-06-13 ENCOUNTER — LAB VISIT (OUTPATIENT)
Dept: LAB | Facility: HOSPITAL | Age: 52
End: 2022-06-13
Attending: INTERNAL MEDICINE
Payer: COMMERCIAL

## 2022-06-13 DIAGNOSIS — Z12.11 COLON CANCER SCREENING: ICD-10-CM

## 2022-06-13 PROCEDURE — 82274 ASSAY TEST FOR BLOOD FECAL: CPT | Performed by: INTERNAL MEDICINE

## 2022-06-16 ENCOUNTER — LAB VISIT (OUTPATIENT)
Dept: LAB | Facility: HOSPITAL | Age: 52
End: 2022-06-16
Attending: INTERNAL MEDICINE
Payer: COMMERCIAL

## 2022-06-16 DIAGNOSIS — E61.1 IRON DEFICIENCY: ICD-10-CM

## 2022-06-16 LAB
BASOPHILS # BLD AUTO: 0.03 K/UL (ref 0–0.2)
BASOPHILS NFR BLD: 0.5 % (ref 0–1.9)
DIFFERENTIAL METHOD: ABNORMAL
EOSINOPHIL # BLD AUTO: 0.1 K/UL (ref 0–0.5)
EOSINOPHIL NFR BLD: 1.7 % (ref 0–8)
ERYTHROCYTE [DISTWIDTH] IN BLOOD BY AUTOMATED COUNT: 19.3 % (ref 11.5–14.5)
FERRITIN SERPL-MCNC: 28 NG/ML (ref 20–300)
HCT VFR BLD AUTO: 39.7 % (ref 37–48.5)
HEMOCCULT STL QL IA: NEGATIVE
HGB BLD-MCNC: 12 G/DL (ref 12–16)
IMM GRANULOCYTES # BLD AUTO: 0.01 K/UL (ref 0–0.04)
IMM GRANULOCYTES NFR BLD AUTO: 0.2 % (ref 0–0.5)
IRON SERPL-MCNC: 55 UG/DL (ref 30–160)
LYMPHOCYTES # BLD AUTO: 3.1 K/UL (ref 1–4.8)
LYMPHOCYTES NFR BLD: 53.2 % (ref 18–48)
MCH RBC QN AUTO: 28.8 PG (ref 27–31)
MCHC RBC AUTO-ENTMCNC: 30.2 G/DL (ref 32–36)
MCV RBC AUTO: 95 FL (ref 82–98)
MONOCYTES # BLD AUTO: 0.5 K/UL (ref 0.3–1)
MONOCYTES NFR BLD: 7.8 % (ref 4–15)
NEUTROPHILS # BLD AUTO: 2.1 K/UL (ref 1.8–7.7)
NEUTROPHILS NFR BLD: 36.6 % (ref 38–73)
NRBC BLD-RTO: 0 /100 WBC
PLATELET # BLD AUTO: 255 K/UL (ref 150–450)
PMV BLD AUTO: 9.6 FL (ref 9.2–12.9)
RBC # BLD AUTO: 4.16 M/UL (ref 4–5.4)
SATURATED IRON: 13 % (ref 20–50)
TOTAL IRON BINDING CAPACITY: 411 UG/DL (ref 250–450)
TRANSFERRIN SERPL-MCNC: 278 MG/DL (ref 200–375)
WBC # BLD AUTO: 5.79 K/UL (ref 3.9–12.7)

## 2022-06-16 PROCEDURE — 36415 COLL VENOUS BLD VENIPUNCTURE: CPT | Performed by: INTERNAL MEDICINE

## 2022-06-16 PROCEDURE — 84466 ASSAY OF TRANSFERRIN: CPT | Performed by: INTERNAL MEDICINE

## 2022-06-16 PROCEDURE — 82728 ASSAY OF FERRITIN: CPT | Performed by: INTERNAL MEDICINE

## 2022-06-16 PROCEDURE — 85025 COMPLETE CBC W/AUTO DIFF WBC: CPT | Performed by: INTERNAL MEDICINE

## 2022-06-20 ENCOUNTER — PATIENT MESSAGE (OUTPATIENT)
Dept: INTERNAL MEDICINE | Facility: CLINIC | Age: 52
End: 2022-06-20
Payer: COMMERCIAL

## 2022-09-07 ENCOUNTER — TELEPHONE (OUTPATIENT)
Dept: OBSTETRICS AND GYNECOLOGY | Facility: CLINIC | Age: 52
End: 2022-09-07
Payer: COMMERCIAL

## 2022-09-07 ENCOUNTER — PATIENT MESSAGE (OUTPATIENT)
Dept: OBSTETRICS AND GYNECOLOGY | Facility: CLINIC | Age: 52
End: 2022-09-07
Payer: COMMERCIAL

## 2022-09-07 NOTE — TELEPHONE ENCOUNTER
I received a copy of your bone density results from [facility] from 8/26/2022.  The test shows normal bone density.    Dr Kaba

## 2022-09-29 DIAGNOSIS — Z12.11 SCREENING FOR COLON CANCER: Primary | ICD-10-CM

## 2023-01-10 LAB — CRC RECOMMENDATION EXT: NORMAL

## 2023-04-26 DIAGNOSIS — I10 ESSENTIAL HYPERTENSION: ICD-10-CM

## 2023-05-31 ENCOUNTER — OFFICE VISIT (OUTPATIENT)
Dept: OBSTETRICS AND GYNECOLOGY | Facility: CLINIC | Age: 53
End: 2023-05-31
Payer: COMMERCIAL

## 2023-05-31 VITALS
BODY MASS INDEX: 40.39 KG/M2 | SYSTOLIC BLOOD PRESSURE: 128 MMHG | DIASTOLIC BLOOD PRESSURE: 84 MMHG | WEIGHT: 250.25 LBS

## 2023-05-31 DIAGNOSIS — Z01.419 ROUTINE GYNECOLOGICAL EXAMINATION: Primary | ICD-10-CM

## 2023-05-31 PROCEDURE — 3008F BODY MASS INDEX DOCD: CPT | Mod: CPTII,S$GLB,, | Performed by: OBSTETRICS & GYNECOLOGY

## 2023-05-31 PROCEDURE — 99999 PR PBB SHADOW E&M-EST. PATIENT-LVL II: CPT | Mod: PBBFAC,,, | Performed by: OBSTETRICS & GYNECOLOGY

## 2023-05-31 PROCEDURE — 99396 PR PREVENTIVE VISIT,EST,40-64: ICD-10-PCS | Mod: S$GLB,,, | Performed by: OBSTETRICS & GYNECOLOGY

## 2023-05-31 PROCEDURE — 3079F PR MOST RECENT DIASTOLIC BLOOD PRESSURE 80-89 MM HG: ICD-10-PCS | Mod: CPTII,S$GLB,, | Performed by: OBSTETRICS & GYNECOLOGY

## 2023-05-31 PROCEDURE — 3074F PR MOST RECENT SYSTOLIC BLOOD PRESSURE < 130 MM HG: ICD-10-PCS | Mod: CPTII,S$GLB,, | Performed by: OBSTETRICS & GYNECOLOGY

## 2023-05-31 PROCEDURE — 99396 PREV VISIT EST AGE 40-64: CPT | Mod: S$GLB,,, | Performed by: OBSTETRICS & GYNECOLOGY

## 2023-05-31 PROCEDURE — 3074F SYST BP LT 130 MM HG: CPT | Mod: CPTII,S$GLB,, | Performed by: OBSTETRICS & GYNECOLOGY

## 2023-05-31 PROCEDURE — 1159F MED LIST DOCD IN RCRD: CPT | Mod: CPTII,S$GLB,, | Performed by: OBSTETRICS & GYNECOLOGY

## 2023-05-31 PROCEDURE — 3079F DIAST BP 80-89 MM HG: CPT | Mod: CPTII,S$GLB,, | Performed by: OBSTETRICS & GYNECOLOGY

## 2023-05-31 PROCEDURE — 99999 PR PBB SHADOW E&M-EST. PATIENT-LVL II: ICD-10-PCS | Mod: PBBFAC,,, | Performed by: OBSTETRICS & GYNECOLOGY

## 2023-05-31 PROCEDURE — 3008F PR BODY MASS INDEX (BMI) DOCUMENTED: ICD-10-PCS | Mod: CPTII,S$GLB,, | Performed by: OBSTETRICS & GYNECOLOGY

## 2023-05-31 PROCEDURE — 1159F PR MEDICATION LIST DOCUMENTED IN MEDICAL RECORD: ICD-10-PCS | Mod: CPTII,S$GLB,, | Performed by: OBSTETRICS & GYNECOLOGY

## 2023-05-31 RX ORDER — OXYBUTYNIN CHLORIDE 10 MG/1
10 TABLET, EXTENDED RELEASE ORAL
COMMUNITY
Start: 2023-03-02 | End: 2023-12-12

## 2023-05-31 NOTE — PROGRESS NOTES
54 yo postmenopausal female who presents today for routine gyn visit.  The patient had an endometrial ablation for menorrhagia.  Patient reports no bleeding since last visit here.  No other complaints today.  . Declines STD Testing today.    ROS:  GENERAL: Denies weight gain or weight loss. Feeling well overall.   SKIN: Denies rash or lesions.   HEAD: Denies head injury or headache.   CHEST: Denies chest pain or shortness of breath.   CARDIOVASCULAR: Denies palpitations or left sided chest pain.   ABDOMEN: No abdominal pain, constipation, diarrhea, nausea, vomiting or rectal bleeding.   URINARY: No frequency, dysuria, hematuria, or burning on urination.  REPRODUCTIVE: no complaints  BREASTS:  denies pain, lumps, or nipple discharge.   HEMATOLOGIC: No easy bruisability or excessive bleeding.   MUSCULOSKELETAL: Denies joint pain or swelling.   NEUROLOGIC: Denies syncope or weakness.   PSYCHIATRIC: Denies depression, anxiety or mood swings.     PE:   Vitals: /84   Wt 113.5 kg (250 lb 3.6 oz)   BMI 40.39 kg/m²   APPEARANCE: Well nourished, well developed, in no acute distress.  ABDOMEN: Soft. No tenderness or masses. No hepatosplenomegaly. No hernias. Obese.  BREASTS: Symmetrical, no skin changes or visible lesions. No palpable masses, nipple discharge or adenopathy bilaterally.  PELVIC: Normal external female genitalia without lesions. Normal hair distribution. Adequate perineal body, normal urethral meatus. Vagina moist and well rugated without lesions or discharge. Cervix pink and without lesions. (difficult to see given body habitus) No significant cystocele or rectocele. Bimanual exam showed uterus normal size, shape, position, mobile and nontender. Adnexa without masses or tenderness. Urethra and bladder normal.  EXTREMITIES: No clubbing cyanosis or edema.      AP  Routine gyn  -s/p normal breast exam: mammogram uptodate  -s/p normal pelvic exam:   -Pap uptodate  -STD testing: declined  -colon  cancer screening completed with Dr. Pandey, PCP      GEOVANY Kaba MD

## 2023-09-26 ENCOUNTER — PATIENT OUTREACH (OUTPATIENT)
Dept: ADMINISTRATIVE | Facility: HOSPITAL | Age: 53
End: 2023-09-26
Payer: COMMERCIAL

## 2023-09-26 ENCOUNTER — OFFICE VISIT (OUTPATIENT)
Dept: INTERNAL MEDICINE | Facility: CLINIC | Age: 53
End: 2023-09-26
Payer: COMMERCIAL

## 2023-09-26 ENCOUNTER — LAB VISIT (OUTPATIENT)
Dept: LAB | Facility: HOSPITAL | Age: 53
End: 2023-09-26
Payer: COMMERCIAL

## 2023-09-26 VITALS
BODY MASS INDEX: 40.98 KG/M2 | SYSTOLIC BLOOD PRESSURE: 118 MMHG | DIASTOLIC BLOOD PRESSURE: 65 MMHG | WEIGHT: 255 LBS | HEIGHT: 66 IN

## 2023-09-26 DIAGNOSIS — Z00.00 ANNUAL PHYSICAL EXAM: Primary | ICD-10-CM

## 2023-09-26 DIAGNOSIS — C44.41 BASAL CELL CARCINOMA (BCC) OF SCALP: ICD-10-CM

## 2023-09-26 DIAGNOSIS — Z00.00 ANNUAL PHYSICAL EXAM: ICD-10-CM

## 2023-09-26 DIAGNOSIS — N39.41 URGE INCONTINENCE: ICD-10-CM

## 2023-09-26 DIAGNOSIS — G47.39 OTHER SLEEP APNEA: ICD-10-CM

## 2023-09-26 DIAGNOSIS — I10 ESSENTIAL HYPERTENSION: ICD-10-CM

## 2023-09-26 DIAGNOSIS — E55.9 MILD VITAMIN D DEFICIENCY: ICD-10-CM

## 2023-09-26 DIAGNOSIS — R60.9 EDEMA, UNSPECIFIED TYPE: ICD-10-CM

## 2023-09-26 DIAGNOSIS — Z98.84 S/P GASTRIC BYPASS: ICD-10-CM

## 2023-09-26 DIAGNOSIS — E53.8 B12 DEFICIENCY: ICD-10-CM

## 2023-09-26 DIAGNOSIS — E66.01 MORBID OBESITY WITH BMI OF 40.0-44.9, ADULT: ICD-10-CM

## 2023-09-26 PROBLEM — K57.90 DIVERTICULOSIS: Status: ACTIVE | Noted: 2023-09-26

## 2023-09-26 LAB
25(OH)D3+25(OH)D2 SERPL-MCNC: 16 NG/ML (ref 30–96)
ALBUMIN SERPL BCP-MCNC: 3.6 G/DL (ref 3.5–5.2)
ALBUMIN SERPL BCP-MCNC: 3.6 G/DL (ref 3.5–5.2)
ALP SERPL-CCNC: 94 U/L (ref 55–135)
ALP SERPL-CCNC: 94 U/L (ref 55–135)
ALT SERPL W/O P-5'-P-CCNC: 18 U/L (ref 10–44)
ALT SERPL W/O P-5'-P-CCNC: 18 U/L (ref 10–44)
ANION GAP SERPL CALC-SCNC: 8 MMOL/L (ref 8–16)
ANION GAP SERPL CALC-SCNC: 8 MMOL/L (ref 8–16)
AST SERPL-CCNC: 20 U/L (ref 10–40)
AST SERPL-CCNC: 20 U/L (ref 10–40)
BASOPHILS # BLD AUTO: 0.03 K/UL (ref 0–0.2)
BASOPHILS NFR BLD: 0.6 % (ref 0–1.9)
BILIRUB SERPL-MCNC: 1 MG/DL (ref 0.1–1)
BILIRUB SERPL-MCNC: 1 MG/DL (ref 0.1–1)
BUN SERPL-MCNC: 13 MG/DL (ref 6–20)
BUN SERPL-MCNC: 13 MG/DL (ref 6–20)
CALCIUM SERPL-MCNC: 9.2 MG/DL (ref 8.7–10.5)
CALCIUM SERPL-MCNC: 9.2 MG/DL (ref 8.7–10.5)
CHLORIDE SERPL-SCNC: 108 MMOL/L (ref 95–110)
CHLORIDE SERPL-SCNC: 108 MMOL/L (ref 95–110)
CHOLEST SERPL-MCNC: 163 MG/DL (ref 120–199)
CHOLEST/HDLC SERPL: 2.5 {RATIO} (ref 2–5)
CO2 SERPL-SCNC: 26 MMOL/L (ref 23–29)
CO2 SERPL-SCNC: 26 MMOL/L (ref 23–29)
CREAT SERPL-MCNC: 0.8 MG/DL (ref 0.5–1.4)
CREAT SERPL-MCNC: 0.8 MG/DL (ref 0.5–1.4)
DIFFERENTIAL METHOD: ABNORMAL
EOSINOPHIL # BLD AUTO: 0.1 K/UL (ref 0–0.5)
EOSINOPHIL NFR BLD: 2.6 % (ref 0–8)
ERYTHROCYTE [DISTWIDTH] IN BLOOD BY AUTOMATED COUNT: 14.2 % (ref 11.5–14.5)
EST. GFR  (NO RACE VARIABLE): >60 ML/MIN/1.73 M^2
EST. GFR  (NO RACE VARIABLE): >60 ML/MIN/1.73 M^2
ESTIMATED AVG GLUCOSE: 105 MG/DL (ref 68–131)
FERRITIN SERPL-MCNC: 77 NG/ML (ref 20–300)
GLUCOSE SERPL-MCNC: 76 MG/DL (ref 70–110)
GLUCOSE SERPL-MCNC: 76 MG/DL (ref 70–110)
HBA1C MFR BLD: 5.3 % (ref 4–5.6)
HCT VFR BLD AUTO: 39.5 % (ref 37–48.5)
HDLC SERPL-MCNC: 64 MG/DL (ref 40–75)
HDLC SERPL: 39.3 % (ref 20–50)
HGB BLD-MCNC: 12.2 G/DL (ref 12–16)
IMM GRANULOCYTES # BLD AUTO: 0 K/UL (ref 0–0.04)
IMM GRANULOCYTES NFR BLD AUTO: 0 % (ref 0–0.5)
IRON SERPL-MCNC: 87 UG/DL (ref 30–160)
LDLC SERPL CALC-MCNC: 91 MG/DL (ref 63–159)
LYMPHOCYTES # BLD AUTO: 1.9 K/UL (ref 1–4.8)
LYMPHOCYTES NFR BLD: 38.3 % (ref 18–48)
MAGNESIUM SERPL-MCNC: 1.9 MG/DL (ref 1.6–2.6)
MCH RBC QN AUTO: 30.4 PG (ref 27–31)
MCHC RBC AUTO-ENTMCNC: 30.9 G/DL (ref 32–36)
MCV RBC AUTO: 99 FL (ref 82–98)
MONOCYTES # BLD AUTO: 0.4 K/UL (ref 0.3–1)
MONOCYTES NFR BLD: 7.7 % (ref 4–15)
NEUTROPHILS # BLD AUTO: 2.5 K/UL (ref 1.8–7.7)
NEUTROPHILS NFR BLD: 50.8 % (ref 38–73)
NONHDLC SERPL-MCNC: 99 MG/DL
NRBC BLD-RTO: 0 /100 WBC
PLATELET # BLD AUTO: 232 K/UL (ref 150–450)
PMV BLD AUTO: 10.1 FL (ref 9.2–12.9)
POTASSIUM SERPL-SCNC: 4.4 MMOL/L (ref 3.5–5.1)
POTASSIUM SERPL-SCNC: 4.4 MMOL/L (ref 3.5–5.1)
PROT SERPL-MCNC: 7.1 G/DL (ref 6–8.4)
PROT SERPL-MCNC: 7.1 G/DL (ref 6–8.4)
RBC # BLD AUTO: 4.01 M/UL (ref 4–5.4)
SATURATED IRON: 29 % (ref 20–50)
SODIUM SERPL-SCNC: 142 MMOL/L (ref 136–145)
SODIUM SERPL-SCNC: 142 MMOL/L (ref 136–145)
TOTAL IRON BINDING CAPACITY: 295 UG/DL (ref 250–450)
TRANSFERRIN SERPL-MCNC: 199 MG/DL (ref 200–375)
TRIGL SERPL-MCNC: 40 MG/DL (ref 30–150)
TSH SERPL DL<=0.005 MIU/L-ACNC: 1.09 UIU/ML (ref 0.4–4)
VIT B12 SERPL-MCNC: 542 PG/ML (ref 210–950)
WBC # BLD AUTO: 4.94 K/UL (ref 3.9–12.7)

## 2023-09-26 PROCEDURE — 82607 VITAMIN B-12: CPT | Performed by: INTERNAL MEDICINE

## 2023-09-26 PROCEDURE — 82525 ASSAY OF COPPER: CPT | Performed by: INTERNAL MEDICINE

## 2023-09-26 PROCEDURE — 90677 PNEUMOCOCCAL CONJUGATE VACCINE 20-VALENT: ICD-10-PCS | Mod: S$GLB,,, | Performed by: INTERNAL MEDICINE

## 2023-09-26 PROCEDURE — 3008F BODY MASS INDEX DOCD: CPT | Mod: CPTII,S$GLB,, | Performed by: INTERNAL MEDICINE

## 2023-09-26 PROCEDURE — 99396 PR PREVENTIVE VISIT,EST,40-64: ICD-10-PCS | Mod: 25,S$GLB,, | Performed by: INTERNAL MEDICINE

## 2023-09-26 PROCEDURE — 84630 ASSAY OF ZINC: CPT | Performed by: INTERNAL MEDICINE

## 2023-09-26 PROCEDURE — 1159F PR MEDICATION LIST DOCUMENTED IN MEDICAL RECORD: ICD-10-PCS | Mod: CPTII,S$GLB,, | Performed by: INTERNAL MEDICINE

## 2023-09-26 PROCEDURE — 1159F MED LIST DOCD IN RCRD: CPT | Mod: CPTII,S$GLB,, | Performed by: INTERNAL MEDICINE

## 2023-09-26 PROCEDURE — 80053 COMPREHEN METABOLIC PANEL: CPT | Performed by: INTERNAL MEDICINE

## 2023-09-26 PROCEDURE — 84425 ASSAY OF VITAMIN B-1: CPT | Performed by: INTERNAL MEDICINE

## 2023-09-26 PROCEDURE — 3078F DIAST BP <80 MM HG: CPT | Mod: CPTII,S$GLB,, | Performed by: INTERNAL MEDICINE

## 2023-09-26 PROCEDURE — 80061 LIPID PANEL: CPT | Performed by: INTERNAL MEDICINE

## 2023-09-26 PROCEDURE — 83540 ASSAY OF IRON: CPT | Performed by: INTERNAL MEDICINE

## 2023-09-26 PROCEDURE — 85025 COMPLETE CBC W/AUTO DIFF WBC: CPT | Performed by: INTERNAL MEDICINE

## 2023-09-26 PROCEDURE — 90677 PCV20 VACCINE IM: CPT | Mod: S$GLB,,, | Performed by: INTERNAL MEDICINE

## 2023-09-26 PROCEDURE — 90471 IMMUNIZATION ADMIN: CPT | Mod: S$GLB,,, | Performed by: INTERNAL MEDICINE

## 2023-09-26 PROCEDURE — 3008F PR BODY MASS INDEX (BMI) DOCUMENTED: ICD-10-PCS | Mod: CPTII,S$GLB,, | Performed by: INTERNAL MEDICINE

## 2023-09-26 PROCEDURE — 82728 ASSAY OF FERRITIN: CPT | Performed by: INTERNAL MEDICINE

## 2023-09-26 PROCEDURE — 99396 PREV VISIT EST AGE 40-64: CPT | Mod: 25,S$GLB,, | Performed by: INTERNAL MEDICINE

## 2023-09-26 PROCEDURE — 84443 ASSAY THYROID STIM HORMONE: CPT | Performed by: INTERNAL MEDICINE

## 2023-09-26 PROCEDURE — 36415 COLL VENOUS BLD VENIPUNCTURE: CPT | Performed by: INTERNAL MEDICINE

## 2023-09-26 PROCEDURE — 99999 PR PBB SHADOW E&M-EST. PATIENT-LVL III: ICD-10-PCS | Mod: PBBFAC,,, | Performed by: INTERNAL MEDICINE

## 2023-09-26 PROCEDURE — 1160F PR REVIEW ALL MEDS BY PRESCRIBER/CLIN PHARMACIST DOCUMENTED: ICD-10-PCS | Mod: CPTII,S$GLB,, | Performed by: INTERNAL MEDICINE

## 2023-09-26 PROCEDURE — 82306 VITAMIN D 25 HYDROXY: CPT | Performed by: INTERNAL MEDICINE

## 2023-09-26 PROCEDURE — 3074F PR MOST RECENT SYSTOLIC BLOOD PRESSURE < 130 MM HG: ICD-10-PCS | Mod: CPTII,S$GLB,, | Performed by: INTERNAL MEDICINE

## 2023-09-26 PROCEDURE — 84466 ASSAY OF TRANSFERRIN: CPT | Performed by: INTERNAL MEDICINE

## 2023-09-26 PROCEDURE — 83036 HEMOGLOBIN GLYCOSYLATED A1C: CPT | Performed by: INTERNAL MEDICINE

## 2023-09-26 PROCEDURE — 1160F RVW MEDS BY RX/DR IN RCRD: CPT | Mod: CPTII,S$GLB,, | Performed by: INTERNAL MEDICINE

## 2023-09-26 PROCEDURE — 83735 ASSAY OF MAGNESIUM: CPT | Performed by: INTERNAL MEDICINE

## 2023-09-26 PROCEDURE — 3078F PR MOST RECENT DIASTOLIC BLOOD PRESSURE < 80 MM HG: ICD-10-PCS | Mod: CPTII,S$GLB,, | Performed by: INTERNAL MEDICINE

## 2023-09-26 PROCEDURE — 99999 PR PBB SHADOW E&M-EST. PATIENT-LVL III: CPT | Mod: PBBFAC,,, | Performed by: INTERNAL MEDICINE

## 2023-09-26 PROCEDURE — 90471 PNEUMOCOCCAL CONJUGATE VACCINE 20-VALENT: ICD-10-PCS | Mod: S$GLB,,, | Performed by: INTERNAL MEDICINE

## 2023-09-26 PROCEDURE — 3074F SYST BP LT 130 MM HG: CPT | Mod: CPTII,S$GLB,, | Performed by: INTERNAL MEDICINE

## 2023-09-26 RX ORDER — TRIAMTERENE AND HYDROCHLOROTHIAZIDE 37.5; 25 MG/1; MG/1
CAPSULE ORAL
Qty: 30 CAPSULE | Refills: 11 | Status: SHIPPED | OUTPATIENT
Start: 2023-09-26

## 2023-09-26 NOTE — PROGRESS NOTES
Patient ID: Camila Black is a 53 y.o. female.    Chief Complaint: Annual Exam      Assessment:       1. Annual physical exam    2. Gastric bypass 2006    3. Morbid obesity with BMI of 40.0-44.9, adult    4. B12 deficiency    5. Mild vitamin D deficiency    6. Basal cell carcinoma (BCC) of scalp: 2010    7. Sleep apnea: SIGNIFICANT in 2007 before bariatric surgery (400#)    8. Urge incontinence    9. Edema, unspecified type          Plan:         1. Annual physical exam  -     CBC Auto Differential; Future; Expected date: 09/26/2023  -     Comprehensive Metabolic Panel; Future; Expected date: 09/26/2023  -     Ferritin; Future; Expected date: 09/26/2023  -     Iron and TIBC; Future; Expected date: 09/26/2023  -     Lipid Panel; Future; Expected date: 09/26/2023  -     Hemoglobin A1C; Future; Expected date: 09/26/2023  -     Vitamin B12; Future; Expected date: 09/26/2023  -     Vitamin B1; Future; Expected date: 09/26/2023  -     Vitamin D; Future; Expected date: 09/26/2023  -     TSH; Future; Expected date: 09/26/2023  -     Magnesium; Future; Expected date: 09/26/2023  -     ZINC; Future; Expected date: 09/26/2023  -     COPPER, SERUM; Future; Expected date: 09/26/2023    2. Gastric bypass 2006    3. Morbid obesity with BMI of 40.0-44.9, adult    4. B12 deficiency    5. Mild vitamin D deficiency    6. Basal cell carcinoma (BCC) of scalp: 2010    7. Sleep apnea: SIGNIFICANT in 2007 before bariatric surgery (400#)  -     Home Sleep Study; Future    8. Urge incontinence    9. Edema, unspecified type    Other orders  -     triamterene-hydrochlorothiazide 37.5-25 mg (DYAZIDE) 37.5-25 mg per capsule; As needed for edema  Dispense: 30 capsule; Refill: 11  -     (In Office Administered) Pneumococcal Conjugate Vaccine (20 Valent) (IM)       She is planning to continue to see an outside dermatologist  Low-sodium, low-carbohydrate eating, portion control, exercise and weight loss recommendations reviewed  ARPIT again  Please schedule appointment with Dr. Cassidy in 3-4 weeks.    You may go to our [employer] [facility] store on the first floor of our [facility] to order your compression stockings.    If insurance does not cover compression stockings you may try [employer] or any pharmacy or online to order knee high compression stockings with 20-30 mmHg pressure.     reviewed, she is willing to do a sleep study  Labs and review  Keep gyn follow-up  Get outside records for her colonoscopy  Prevnar 20 today  Flu shot at a later point  She is following with outside Dermatology for her history of skin cancer  I will review all studies and determine further tx depending on findings       Subjective:   Annual exam    Due for labs    BP doing well    BMI 41, reviewed; this has been a lifelong problem.  She has made some progress.  Recall that she had gastric bypass surgery in 2007.    Mammogram done last week, results acceptable.    Due for colon cancer testing, reviewed.  She says she had a COLONOSCOPY at  ([facility]) and it was OK but prep not complete so needs to be done again in 1 year.    Chronic arthralgias, recovered from knee surgery; may need other knee but she is waiting.    Distant history of skin cancer, [facility] 2016.  She was seen at [facility] this year.  She is planning to get established with another outside dermatologist at this time.    Years ago had sleep study- before bariatric surgery.  Discussed again.  She is having difficulty with her weight.  She is not sure about snoring or stopping breathing.  Her last sleep study was completed in 2007 was quite severe, recommended a recheck and she is willing to do so.    Mammogram September 2023, outside of [facility]  Gyn May 2023  FIT KIT June 2022  Dermatology 2016  Sleep study 2007          Patient Active Problem List   Diagnosis    Mild vitamin D deficiency    Unspecified hypertrophic and atrophic condition of skin    Benign neoplasm of scalp and skin of neck    Varicose veins of lower extremities with other complications    S/P endometrial ablation    Primary localized osteoarthrosis, lower leg    Gastroesophageal reflux disease: normal EGD 2/16    Gastric bypass 2006    Gallbladder sludge: see ultrasound 12/15    B12 deficiency    Traumatic arthritis of right knee    Status post total right knee replacement     Morbid obesity with BMI of 40.0-44.9, adult    Basal cell carcinoma (BCC) of scalp: 2010    Sleep apnea: SIGNIFICANT in 2007 before bariatric surgery (400#)    Urge incontinence        Review of Systems   Constitutional:  Negative for fatigue.   HENT:  Negative for hearing loss.    Eyes:  Negative for visual disturbance.   Respiratory:  Negative for shortness of breath.         Not sure about snoring or apnea   Cardiovascular:  Negative for chest pain.        Occasional fluid retention if she eats a lot of carbs or salt   Gastrointestinal:  Negative for abdominal pain, constipation and diarrhea.   Genitourinary:  Positive for urgency. Negative for dysuria, frequency and vaginal bleeding.        On meds for urge incontinence   Musculoskeletal:  Negative for joint swelling.   Skin:  Negative for rash.   Neurological:  Negative for weakness, light-headedness and headaches.   Psychiatric/Behavioral:  Negative for sleep disturbance.          Objective:      Physical Exam  Vitals and nursing note reviewed.   Constitutional:       Appearance: She is well-developed.   HENT:      Head: Normocephalic and atraumatic.      Right Ear: External ear normal.      Left Ear: External ear normal.      Nose: Nose normal.      Mouth/Throat:      Pharynx: No oropharyngeal exudate.   Eyes:      General: No scleral icterus.     Extraocular Movements: Extraocular movements intact.      Conjunctiva/sclera: Conjunctivae normal.   Neck:      Thyroid: No thyromegaly.      Vascular: No JVD.   Cardiovascular:      Rate and Rhythm: Normal rate and regular rhythm.      Heart sounds: Normal heart sounds. No murmur heard.     No gallop.   Pulmonary:      Effort: Pulmonary effort is normal. No respiratory distress.      Breath sounds: Normal breath sounds. No wheezing.   Abdominal:      General: Bowel sounds are normal. There is no distension.      Palpations: Abdomen is soft. There is no mass.      Tenderness: There is no abdominal tenderness. There  is no guarding or rebound.   Musculoskeletal:         General: No tenderness. Normal range of motion.      Cervical back: Normal range of motion and neck supple.      Comments: Trace nonpitting edema bilaterally   Lymphadenopathy:      Cervical: No cervical adenopathy.   Skin:     General: Skin is warm.      Findings: No erythema or rash.   Neurological:      General: No focal deficit present.      Mental Status: She is alert and oriented to person, place, and time.      Cranial Nerves: No cranial nerve deficit.      Coordination: Coordination normal.   Psychiatric:         Behavior: Behavior normal.         Thought Content: Thought content normal.         Judgment: Judgment normal.             Health Maintenance Due   Topic Date Due    Colorectal Cancer Screening  06/16/2023    Influenza Vaccine (1) 09/01/2023

## 2023-09-26 NOTE — PROGRESS NOTES
Health Maintenance Due   Topic Date Due    Colorectal Cancer Screening  06/16/2023    Influenza Vaccine (1) 09/01/2023     Triggered LINKS and reconciled immunizations. Updated Care Everywhere. Record request sent to [facility] asking for a copy of pt's most recent colonoscopy results. Chart review completed.

## 2023-09-26 NOTE — LETTER
AUTHORIZATION FOR RELEASE OF   CONFIDENTIAL INFORMATION    Dear Dr. Brook Davis,    We are seeing Camila Black, date of birth 1970, in the clinic at [facility]. Tabitha Pandey MD is the patient's PCP. Camila Black has an outstanding lab/procedure at the time we reviewed her chart. In order to help keep her health information updated, she has authorized us to request the following medical record(s):        (  )  MAMMOGRAM                                      ( X )  COLONOSCOPY      (  )  PAP SMEAR                                          (  )  OUTSIDE LAB RESULTS     (  )  DEXA SCAN                                          (  )  EYE EXAM            (  )  FOOT EXAM                                          (  )  ENTIRE RECORD     (  )  OUTSIDE IMMUNIZATIONS                 (  )  _______________         Please fax records to Tabitha Pandey MD, [phone]     If you have any questions, please contact JOHAN Borjas at [phone].           Patient Name: Camila Black  : 1970  Patient Phone #: [phone]

## 2023-09-26 NOTE — PROGRESS NOTES
Health Maintenance Due   Topic Date Due    Influenza Vaccine (1) 09/01/2023     Triggered LINKS and reconciled immunizations. Updated Care Everywhere. Imported outside colonoscopy results received from Dr. Brook Davis into .  frequency set to repeat colonoscopy in 5 yrs per provider recommendation. Chart review completed.

## 2023-09-28 LAB
COPPER SERPL-MCNC: 1120 UG/L (ref 810–1990)
ZINC SERPL-MCNC: 76 UG/DL (ref 60–130)

## 2023-09-29 ENCOUNTER — TELEPHONE (OUTPATIENT)
Dept: SLEEP MEDICINE | Facility: OTHER | Age: 53
End: 2023-09-29
Payer: COMMERCIAL

## 2023-09-29 LAB — VIT B1 BLD-MCNC: 66 UG/L (ref 38–122)

## 2023-10-03 ENCOUNTER — PATIENT MESSAGE (OUTPATIENT)
Dept: INTERNAL MEDICINE | Facility: CLINIC | Age: 53
End: 2023-10-03
Payer: COMMERCIAL

## 2023-10-03 DIAGNOSIS — E55.9 MILD VITAMIN D DEFICIENCY: Primary | ICD-10-CM

## 2023-10-04 RX ORDER — ERGOCALCIFEROL 1.25 MG/1
CAPSULE ORAL
Qty: 24 CAPSULE | Refills: 3 | Status: SHIPPED | OUTPATIENT
Start: 2023-10-04

## 2023-10-04 RX ORDER — VIT C/E/ZN/COPPR/LUTEIN/ZEAXAN 250MG-90MG
5000 CAPSULE ORAL DAILY
Qty: 150 CAPSULE | Refills: 12
Start: 2023-10-04

## 2023-10-09 ENCOUNTER — TELEPHONE (OUTPATIENT)
Dept: SLEEP MEDICINE | Facility: OTHER | Age: 53
End: 2023-10-09
Payer: COMMERCIAL

## 2023-10-10 ENCOUNTER — TELEPHONE (OUTPATIENT)
Dept: SLEEP MEDICINE | Facility: OTHER | Age: 53
End: 2023-10-10
Payer: COMMERCIAL

## 2023-10-17 ENCOUNTER — TELEPHONE (OUTPATIENT)
Dept: SLEEP MEDICINE | Facility: OTHER | Age: 53
End: 2023-10-17
Payer: COMMERCIAL

## 2023-10-23 ENCOUNTER — TELEPHONE (OUTPATIENT)
Dept: SLEEP MEDICINE | Facility: OTHER | Age: 53
End: 2023-10-23
Payer: COMMERCIAL

## 2023-11-02 ENCOUNTER — PATIENT MESSAGE (OUTPATIENT)
Dept: INTERNAL MEDICINE | Facility: CLINIC | Age: 53
End: 2023-11-02
Payer: COMMERCIAL

## 2023-11-02 NOTE — TELEPHONE ENCOUNTER
Yes, I can send in the phentermine.  Please check which pharmacy    I would like for her to do a virtual visit with me in 1 month to go over how she is doing on the medication, thank you

## 2023-11-06 RX ORDER — PHENTERMINE HYDROCHLORIDE 37.5 MG/1
37.5 TABLET ORAL
Qty: 30 TABLET | Refills: 0 | Status: SHIPPED | OUTPATIENT
Start: 2023-11-06 | End: 2023-12-12 | Stop reason: SDUPTHER

## 2023-12-12 ENCOUNTER — OFFICE VISIT (OUTPATIENT)
Dept: INTERNAL MEDICINE | Facility: CLINIC | Age: 53
End: 2023-12-12
Payer: COMMERCIAL

## 2023-12-12 VITALS — WEIGHT: 229.81 LBS | HEIGHT: 66 IN | BODY MASS INDEX: 36.93 KG/M2

## 2023-12-12 DIAGNOSIS — E66.01 SEVERE OBESITY (BMI 35.0-35.9 WITH COMORBIDITY): Primary | ICD-10-CM

## 2023-12-12 PROCEDURE — 3008F BODY MASS INDEX DOCD: CPT | Mod: CPTII,95,, | Performed by: INTERNAL MEDICINE

## 2023-12-12 PROCEDURE — 3008F PR BODY MASS INDEX (BMI) DOCUMENTED: ICD-10-PCS | Mod: CPTII,95,, | Performed by: INTERNAL MEDICINE

## 2023-12-12 PROCEDURE — 3044F HG A1C LEVEL LT 7.0%: CPT | Mod: CPTII,95,, | Performed by: INTERNAL MEDICINE

## 2023-12-12 PROCEDURE — 99213 PR OFFICE/OUTPT VISIT, EST, LEVL III, 20-29 MIN: ICD-10-PCS | Mod: 95,,, | Performed by: INTERNAL MEDICINE

## 2023-12-12 PROCEDURE — 3044F PR MOST RECENT HEMOGLOBIN A1C LEVEL <7.0%: ICD-10-PCS | Mod: CPTII,95,, | Performed by: INTERNAL MEDICINE

## 2023-12-12 PROCEDURE — 99213 OFFICE O/P EST LOW 20 MIN: CPT | Mod: 95,,, | Performed by: INTERNAL MEDICINE

## 2023-12-12 RX ORDER — PHENTERMINE HYDROCHLORIDE 37.5 MG/1
37.5 TABLET ORAL
Qty: 30 TABLET | Refills: 0 | Status: SHIPPED | OUTPATIENT
Start: 2023-12-12 | End: 2024-01-22 | Stop reason: SDUPTHER

## 2023-12-12 NOTE — PROGRESS NOTES
Telemedicine Video Visit    The patient location is:  Patient Home   The chief complaint leading to consultation is: obesity  Visit type: Virtual visit with synchronous audio and video  Total time spent with patient: 15 minutes  Each patient to whom he or she provides medical services by telemedicine is:  (1) informed of the relationship between the physician and patient and the respective role of any other health care provider with respect to management of the patient; and (2) notified that he or she may decline to receive medical services by telemedicine and may withdraw from such care at any time.     Follow up of phentermine    She has done extremely well!  Close to 30 lb of weight loss.  Feels great.  No side effects with the medication.  Would like to continue for a while longer.  She is planning to do the sleep study in the new year,  has had some surgeries and is recovering so she needs to give some time for that situation to stabilize.    Blood pressure has been doing well.    BMI now 37 which is a big improvement!    Patient Active Problem List   Diagnosis    Mild vitamin D deficiency    Unspecified hypertrophic and atrophic condition of skin    Benign neoplasm of scalp and skin of neck    Varicose veins of lower extremities with other complications    S/P endometrial ablation    Primary localized osteoarthrosis, lower leg    Gastroesophageal reflux disease: normal EGD 2/16    Gastric bypass 2006    Gallbladder sludge: see ultrasound 12/15    B12 deficiency    Traumatic arthritis of right knee    Status post total right knee replacement    Basal cell carcinoma (BCC) of scalp: 2010    Sleep apnea: SIGNIFICANT in 2007 before bariatric surgery (400#)    Urge incontinence    Diverticulosis: per outside colonoscopy 2023      Review of Systems   Constitutional:         Deliberate weight loss   HENT:  Negative for hearing loss.    Eyes:  Negative for discharge.   Respiratory:  Negative for wheezing.     Cardiovascular:  Negative for chest pain and palpitations.   Gastrointestinal:  Negative for blood in stool, constipation, diarrhea and vomiting.   Genitourinary:  Negative for dysuria and hematuria.   Musculoskeletal:  Negative for neck pain.   Neurological:  Negative for weakness and headaches.   Endo/Heme/Allergies:  Negative for polydipsia.      Physical Exam  Constitutional:       Appearance: She is well-developed.   HENT:      Head: Normocephalic and atraumatic.   Eyes:      Extraocular Movements: Extraocular movements intact.      Conjunctiva/sclera: Conjunctivae normal.   Neck:      Thyroid: No thyromegaly.   Pulmonary:      Effort: No respiratory distress.   Neurological:      General: No focal deficit present.      Mental Status: She is alert and oriented to person, place, and time.   Psychiatric:         Mood and Affect: Mood normal.         Behavior: Behavior normal.         Thought Content: Thought content normal.         Judgment: Judgment normal.        1. Severe obesity (BMI 35.0-35.9 with comorbidity)    Other orders  -     phentermine (ADIPEX-P) 37.5 mg tablet; Take 1 tablet (37.5 mg total) by mouth before breakfast.  Dispense: 30 tablet; Refill: 0       Continue with healthy eating, sleep hygiene, exercise   reviewed  One-month follow-up, call or return sooner with problems in the interim  COVID booster discussed, she may consider   Answers submitted by the patient for this visit:  Review of Systems Questionnaire (Submitted on 12/12/2023)  activity change: No  unexpected weight change: No  rhinorrhea: No  trouble swallowing: No  visual disturbance: No  chest tightness: No  polyuria: No  difficulty urinating: No  menstrual problem: No  joint swelling: No  arthralgias: No  confusion: No  dysphoric mood: No

## 2024-01-22 ENCOUNTER — PATIENT MESSAGE (OUTPATIENT)
Dept: INTERNAL MEDICINE | Facility: CLINIC | Age: 54
End: 2024-01-22
Payer: COMMERCIAL

## 2024-01-22 RX ORDER — PHENTERMINE HYDROCHLORIDE 37.5 MG/1
37.5 TABLET ORAL
Qty: 30 TABLET | Refills: 0 | Status: SHIPPED | OUTPATIENT
Start: 2024-01-22 | End: 2024-02-21

## 2024-01-31 ENCOUNTER — PATIENT MESSAGE (OUTPATIENT)
Dept: OBSTETRICS AND GYNECOLOGY | Facility: CLINIC | Age: 54
End: 2024-01-31
Payer: COMMERCIAL

## 2024-02-29 ENCOUNTER — PATIENT MESSAGE (OUTPATIENT)
Dept: INTERNAL MEDICINE | Facility: CLINIC | Age: 54
End: 2024-02-29
Payer: COMMERCIAL

## 2024-02-29 RX ORDER — PHENTERMINE HYDROCHLORIDE 37.5 MG/1
37.5 TABLET ORAL
Qty: 30 TABLET | Refills: 0 | Status: SHIPPED | OUTPATIENT
Start: 2024-02-29 | End: 2024-03-30

## 2024-06-10 ENCOUNTER — PATIENT MESSAGE (OUTPATIENT)
Dept: INTERNAL MEDICINE | Facility: CLINIC | Age: 54
End: 2024-06-10
Payer: COMMERCIAL

## 2024-10-09 ENCOUNTER — TELEPHONE (OUTPATIENT)
Dept: INTERNAL MEDICINE | Facility: CLINIC | Age: 54
End: 2024-10-09
Payer: COMMERCIAL

## 2024-10-09 DIAGNOSIS — Z12.31 OTHER SCREENING MAMMOGRAM: ICD-10-CM

## 2024-10-09 NOTE — TELEPHONE ENCOUNTER
Please call, she will be due to see me in December.  She needs an office visit with me or Evy within the next 3 months, please schedule and let me know thanks

## 2024-12-18 ENCOUNTER — OFFICE VISIT (OUTPATIENT)
Dept: INTERNAL MEDICINE | Facility: CLINIC | Age: 54
End: 2024-12-18
Payer: COMMERCIAL

## 2024-12-18 ENCOUNTER — LAB VISIT (OUTPATIENT)
Dept: LAB | Facility: HOSPITAL | Age: 54
End: 2024-12-18
Payer: COMMERCIAL

## 2024-12-18 VITALS
SYSTOLIC BLOOD PRESSURE: 120 MMHG | DIASTOLIC BLOOD PRESSURE: 75 MMHG | HEIGHT: 66 IN | BODY MASS INDEX: 40.02 KG/M2 | WEIGHT: 249 LBS

## 2024-12-18 DIAGNOSIS — E66.01 CLASS 3 SEVERE OBESITY WITH BODY MASS INDEX (BMI) OF 40.0 TO 44.9 IN ADULT, UNSPECIFIED OBESITY TYPE, UNSPECIFIED WHETHER SERIOUS COMORBIDITY PRESENT: ICD-10-CM

## 2024-12-18 DIAGNOSIS — E53.8 B12 DEFICIENCY: ICD-10-CM

## 2024-12-18 DIAGNOSIS — E66.813 CLASS 3 SEVERE OBESITY WITH BODY MASS INDEX (BMI) OF 40.0 TO 44.9 IN ADULT, UNSPECIFIED OBESITY TYPE, UNSPECIFIED WHETHER SERIOUS COMORBIDITY PRESENT: ICD-10-CM

## 2024-12-18 DIAGNOSIS — E55.9 MILD VITAMIN D DEFICIENCY: ICD-10-CM

## 2024-12-18 DIAGNOSIS — Z00.00 ANNUAL PHYSICAL EXAM: Primary | ICD-10-CM

## 2024-12-18 DIAGNOSIS — H66.92 OTITIS OF LEFT EAR: ICD-10-CM

## 2024-12-18 DIAGNOSIS — Z98.84 S/P GASTRIC BYPASS: ICD-10-CM

## 2024-12-18 LAB
25(OH)D3+25(OH)D2 SERPL-MCNC: 24 NG/ML (ref 30–96)
ALBUMIN SERPL BCP-MCNC: 3.8 G/DL (ref 3.5–5.2)
ALP SERPL-CCNC: 85 U/L (ref 40–150)
ALT SERPL W/O P-5'-P-CCNC: 16 U/L (ref 10–44)
ANION GAP SERPL CALC-SCNC: 6 MMOL/L (ref 8–16)
AST SERPL-CCNC: 23 U/L (ref 10–40)
BASOPHILS # BLD AUTO: 0.04 K/UL (ref 0–0.2)
BASOPHILS NFR BLD: 0.7 % (ref 0–1.9)
BILIRUB SERPL-MCNC: 1.1 MG/DL (ref 0.1–1)
BUN SERPL-MCNC: 13 MG/DL (ref 6–20)
CALCIUM SERPL-MCNC: 9.3 MG/DL (ref 8.7–10.5)
CHLORIDE SERPL-SCNC: 105 MMOL/L (ref 95–110)
CHOLEST SERPL-MCNC: 181 MG/DL (ref 120–199)
CHOLEST/HDLC SERPL: 2.5 {RATIO} (ref 2–5)
CO2 SERPL-SCNC: 26 MMOL/L (ref 23–29)
CREAT SERPL-MCNC: 0.8 MG/DL (ref 0.5–1.4)
DIFFERENTIAL METHOD BLD: ABNORMAL
EOSINOPHIL # BLD AUTO: 0.1 K/UL (ref 0–0.5)
EOSINOPHIL NFR BLD: 2.3 % (ref 0–8)
ERYTHROCYTE [DISTWIDTH] IN BLOOD BY AUTOMATED COUNT: 14.6 % (ref 11.5–14.5)
EST. GFR  (NO RACE VARIABLE): >60 ML/MIN/1.73 M^2
ESTIMATED AVG GLUCOSE: 105 MG/DL (ref 68–131)
FERRITIN SERPL-MCNC: 25 NG/ML (ref 20–300)
GLUCOSE SERPL-MCNC: 80 MG/DL (ref 70–110)
HBA1C MFR BLD: 5.3 % (ref 4–5.6)
HCT VFR BLD AUTO: 39.6 % (ref 37–48.5)
HDLC SERPL-MCNC: 72 MG/DL (ref 40–75)
HDLC SERPL: 39.8 % (ref 20–50)
HGB BLD-MCNC: 11.9 G/DL (ref 12–16)
IMM GRANULOCYTES # BLD AUTO: 0.06 K/UL (ref 0–0.04)
IMM GRANULOCYTES NFR BLD AUTO: 1 % (ref 0–0.5)
IRON SERPL-MCNC: 99 UG/DL (ref 30–160)
LDLC SERPL CALC-MCNC: 96.4 MG/DL (ref 63–159)
LYMPHOCYTES # BLD AUTO: 3 K/UL (ref 1–4.8)
LYMPHOCYTES NFR BLD: 48.6 % (ref 18–48)
MAGNESIUM SERPL-MCNC: 1.9 MG/DL (ref 1.6–2.6)
MCH RBC QN AUTO: 29.8 PG (ref 27–31)
MCHC RBC AUTO-ENTMCNC: 30.1 G/DL (ref 32–36)
MCV RBC AUTO: 99 FL (ref 82–98)
MONOCYTES # BLD AUTO: 0.4 K/UL (ref 0.3–1)
MONOCYTES NFR BLD: 6.1 % (ref 4–15)
NEUTROPHILS # BLD AUTO: 2.5 K/UL (ref 1.8–7.7)
NEUTROPHILS NFR BLD: 41.3 % (ref 38–73)
NONHDLC SERPL-MCNC: 109 MG/DL
NRBC BLD-RTO: 0 /100 WBC
PLATELET # BLD AUTO: 287 K/UL (ref 150–450)
PMV BLD AUTO: 9.5 FL (ref 9.2–12.9)
POTASSIUM SERPL-SCNC: 4.4 MMOL/L (ref 3.5–5.1)
PROT SERPL-MCNC: 7.8 G/DL (ref 6–8.4)
RBC # BLD AUTO: 4 M/UL (ref 4–5.4)
SATURATED IRON: 24 % (ref 20–50)
SODIUM SERPL-SCNC: 137 MMOL/L (ref 136–145)
TOTAL IRON BINDING CAPACITY: 416 UG/DL (ref 250–450)
TRANSFERRIN SERPL-MCNC: 281 MG/DL (ref 200–375)
TRIGL SERPL-MCNC: 63 MG/DL (ref 30–150)
TSH SERPL DL<=0.005 MIU/L-ACNC: 0.97 UIU/ML (ref 0.4–4)
VIT B12 SERPL-MCNC: 364 PG/ML (ref 210–950)
WBC # BLD AUTO: 6.11 K/UL (ref 3.9–12.7)

## 2024-12-18 PROCEDURE — 83036 HEMOGLOBIN GLYCOSYLATED A1C: CPT | Performed by: INTERNAL MEDICINE

## 2024-12-18 PROCEDURE — 3074F SYST BP LT 130 MM HG: CPT | Mod: CPTII,S$GLB,, | Performed by: INTERNAL MEDICINE

## 2024-12-18 PROCEDURE — 84425 ASSAY OF VITAMIN B-1: CPT | Performed by: INTERNAL MEDICINE

## 2024-12-18 PROCEDURE — 84207 ASSAY OF VITAMIN B-6: CPT | Performed by: INTERNAL MEDICINE

## 2024-12-18 PROCEDURE — 3008F BODY MASS INDEX DOCD: CPT | Mod: CPTII,S$GLB,, | Performed by: INTERNAL MEDICINE

## 2024-12-18 PROCEDURE — 82607 VITAMIN B-12: CPT | Performed by: INTERNAL MEDICINE

## 2024-12-18 PROCEDURE — 80053 COMPREHEN METABOLIC PANEL: CPT | Performed by: INTERNAL MEDICINE

## 2024-12-18 PROCEDURE — 84443 ASSAY THYROID STIM HORMONE: CPT | Performed by: INTERNAL MEDICINE

## 2024-12-18 PROCEDURE — 99999 PR PBB SHADOW E&M-EST. PATIENT-LVL III: CPT | Mod: PBBFAC,,, | Performed by: INTERNAL MEDICINE

## 2024-12-18 PROCEDURE — 1160F RVW MEDS BY RX/DR IN RCRD: CPT | Mod: CPTII,S$GLB,, | Performed by: INTERNAL MEDICINE

## 2024-12-18 PROCEDURE — 82525 ASSAY OF COPPER: CPT | Performed by: INTERNAL MEDICINE

## 2024-12-18 PROCEDURE — 83735 ASSAY OF MAGNESIUM: CPT | Performed by: INTERNAL MEDICINE

## 2024-12-18 PROCEDURE — 36415 COLL VENOUS BLD VENIPUNCTURE: CPT | Performed by: INTERNAL MEDICINE

## 2024-12-18 PROCEDURE — 84466 ASSAY OF TRANSFERRIN: CPT | Performed by: INTERNAL MEDICINE

## 2024-12-18 PROCEDURE — 85025 COMPLETE CBC W/AUTO DIFF WBC: CPT | Performed by: INTERNAL MEDICINE

## 2024-12-18 PROCEDURE — 99396 PREV VISIT EST AGE 40-64: CPT | Mod: S$GLB,,, | Performed by: INTERNAL MEDICINE

## 2024-12-18 PROCEDURE — 82728 ASSAY OF FERRITIN: CPT | Performed by: INTERNAL MEDICINE

## 2024-12-18 PROCEDURE — 80061 LIPID PANEL: CPT | Performed by: INTERNAL MEDICINE

## 2024-12-18 PROCEDURE — 1159F MED LIST DOCD IN RCRD: CPT | Mod: CPTII,S$GLB,, | Performed by: INTERNAL MEDICINE

## 2024-12-18 PROCEDURE — 3078F DIAST BP <80 MM HG: CPT | Mod: CPTII,S$GLB,, | Performed by: INTERNAL MEDICINE

## 2024-12-18 PROCEDURE — 82306 VITAMIN D 25 HYDROXY: CPT | Performed by: INTERNAL MEDICINE

## 2024-12-18 RX ORDER — TRIAMTERENE AND HYDROCHLOROTHIAZIDE 37.5; 25 MG/1; MG/1
CAPSULE ORAL
Qty: 30 CAPSULE | Refills: 11 | Status: SHIPPED | OUTPATIENT
Start: 2024-12-18

## 2024-12-18 RX ORDER — NEOMYCIN SULFATE, POLYMYXIN B SULFATE AND HYDROCORTISONE 10; 3.5; 1 MG/ML; MG/ML; [USP'U]/ML
3 SUSPENSION/ DROPS AURICULAR (OTIC) 3 TIMES DAILY
Qty: 10 ML | Refills: 1 | Status: SHIPPED | OUTPATIENT
Start: 2024-12-18

## 2024-12-18 RX ORDER — FERROUS SULFATE 325(65) MG
1 TABLET ORAL DAILY
COMMUNITY

## 2024-12-18 RX ORDER — GABAPENTIN 300 MG/1
CAPSULE ORAL
COMMUNITY
Start: 2024-11-21

## 2024-12-18 NOTE — PROGRESS NOTES
Patient ID: Camila Black is a 54 y.o. female.    Chief Complaint: Annual Exam      Assessment:       1. Annual physical exam    2. B12 deficiency    3. Gastric bypass 2006    4. Mild vitamin D deficiency    5. Class 3 severe obesity with body mass index (BMI) of 40.0 to 44.9 in adult, unspecified obesity type, unspecified whether serious comorbidity present    6. Otitis of left ear       Plan:           1. Annual physical exam    2. B12 deficiency  -     Vitamin B12; Future; Expected date: 12/18/2024    3. Gastric bypass 2006  -     CBC Auto Differential; Future; Expected date: 12/18/2024  -     Comprehensive Metabolic Panel; Future; Expected date: 12/18/2024  -     Copper, Serum; Future; Expected date: 12/18/2024  -     Hemoglobin A1C; Future; Expected date: 12/18/2024  -     Ferritin; Future; Expected date: 12/18/2024  -     Iron and TIBC; Future; Expected date: 12/18/2024  -     Vitamin D; Future; Expected date: 12/18/2024  -     Vitamin B12; Future; Expected date: 12/18/2024  -     Vitamin B1; Future; Expected date: 12/18/2024  -     TSH; Future; Expected date: 12/18/2024  -     Lipid Panel; Future; Expected date: 12/18/2024  -     Magnesium; Future; Expected date: 12/18/2024  -     VITAMIN B6; Future; Expected date: 12/18/2024    4. Mild vitamin D deficiency  -     Vitamin D; Future; Expected date: 12/18/2024    5. Class 3 severe obesity with body mass index (BMI) of 40.0 to 44.9 in adult, unspecified obesity type, unspecified whether serious comorbidity present- discussed options for weight loss including consideration of bariatric clinic follow-up, she is not interested in medications or further bariatric assessment currently, sitting that she knows what she needs to do.  Portion control, sleep hygiene, exercise and weight loss recommendations reviewed    6. Otitis of left ear  -     neomycin-polymyxin-hydrocortisone (CORTISPORIN) 3.5-10,000-1 mg/mL-unit/mL-% otic suspension; Place 3 drops into  the left ear 3 (three) times daily.  Dispense: 10 mL; Refill: 1.  Follow-up poor results    Other orders  -     triamterene-hydrochlorothiazide 37.5-25 mg (DYAZIDE) 37.5-25 mg per capsule; As needed for edema  Dispense: 30 capsule; Refill: 11    Schedule mammogram  COVID booster today  I will review all studies and determine further tx depending on findings  Sleep apnea issues reviewed, she does not feel that she is having any symptoms and does not want to pursue any assessment of sleep apnea currently, discussed significance of this with regard to weight    Subjective:   CHIEF COMPLAINT:  Patient presents today for annual exam.    RECENT LIFE CHANGES:  She experienced a significant decrease in activity level over the last six months following her 's unexpected death in April. This led to a two-month period of inactivity and increased eating. She reports improved sleep now compared to when her  was sick, averaging six hours per night.    CURRENT MEDICAL ISSUES:  She reports left ear pain that started yesterday. She recently experienced significant stomach bloating from antibiotic use that affected her gut lavern. The bloating has improved with probiotics. She denies constipation or diarrhea.    SKIN CONCERNS:  She developed a nipple burn from falling asleep during red light therapy, which led to an infection requiring extended antibiotic treatment.  This occurred after breast list and other cosmetic surgery this year.  All issues have resolved.    MEDICATIONS:  She takes iron daily and gabapentin as prescribed by her orthopedist. She is not currently taking vitamin D or her fluid pill due to running out of the prescription.    SURGICAL HISTORY:  History of breast lift and back fat removal.    FAMILY HISTORY:  Mother had cervical cancer and COPD. Sister has arthritis and hypertension. Father  of sepsis with kidney failure. Maternal grandmother had pancreatic cancer. Paternal grandmother had breast  cancer.    SOCIAL HISTORY:  She reports minimal alcohol consumption, approximately once or twice per year.      ROS:  General: -fever, -chills, -fatigue, -weight gain, -weight loss  Eyes: -vision changes, -redness, -discharge  ENT: +ear pain, -nasal congestion, -sore throat  Cardiovascular: -chest pain, -palpitations, -lower extremity edema  Respiratory: -cough, -shortness of breath  Gastrointestinal: -abdominal pain, -nausea, -vomiting, -diarrhea, -constipation, -blood in stool, +bloating  Genitourinary: -dysuria, -hematuria, -frequency  Musculoskeletal: -joint pain, -muscle pain  Skin: -rash, +lesion  Neurological: -headache, -dizziness, -numbness, -tingling  Psychiatric: -anxiety, -depression, -sleep difficulty          Patient Active Problem List   Diagnosis    Mild vitamin D deficiency    Unspecified hypertrophic and atrophic condition of skin    Benign neoplasm of scalp and skin of neck    Varicose veins of lower extremities with other complications    S/P endometrial ablation    Primary localized osteoarthrosis, lower leg    Gastroesophageal reflux disease: normal EGD 2/16    Gastric bypass 2006    Gallbladder sludge: see ultrasound 12/15    B12 deficiency    Traumatic arthritis of right knee    Status post total right knee replacement    Basal cell carcinoma (BCC) of scalp: 2010    Sleep apnea: SIGNIFICANT in 2007 before bariatric surgery (400#)    Urge incontinence    Diverticulosis: per outside colonoscopy 2023    Class 3 severe obesity with body mass index (BMI) of 40.0 to 44.9 in adult          Objective:      Physical Exam  Vitals and nursing note reviewed.   Constitutional:       Appearance: She is well-developed.   HENT:      Head: Normocephalic and atraumatic.      Right Ear: Tympanic membrane and external ear normal.      Left Ear: External ear normal.      Ears:      Comments: Left TM slightly erythematous and dull     Nose: Nose normal.      Mouth/Throat:      Pharynx: No oropharyngeal exudate.    Eyes:      General: No scleral icterus.     Extraocular Movements: Extraocular movements intact.      Conjunctiva/sclera: Conjunctivae normal.   Neck:      Thyroid: No thyromegaly.      Vascular: No JVD.   Cardiovascular:      Rate and Rhythm: Normal rate and regular rhythm.      Heart sounds: Normal heart sounds. No murmur heard.     No gallop.   Pulmonary:      Effort: Pulmonary effort is normal. No respiratory distress.      Breath sounds: Normal breath sounds. No wheezing.   Abdominal:      General: Bowel sounds are normal. There is no distension.      Palpations: Abdomen is soft. There is no mass.      Tenderness: There is no abdominal tenderness. There is no guarding or rebound.   Musculoskeletal:         General: No tenderness. Normal range of motion.      Cervical back: Normal range of motion and neck supple.   Lymphadenopathy:      Cervical: No cervical adenopathy.   Skin:     General: Skin is warm.      Findings: No erythema or rash.   Neurological:      General: No focal deficit present.      Mental Status: She is alert and oriented to person, place, and time.      Cranial Nerves: No cranial nerve deficit.      Coordination: Coordination normal.   Psychiatric:         Behavior: Behavior normal.         Thought Content: Thought content normal.         Judgment: Judgment normal.             Health Maintenance Due   Topic Date Due    Mammogram  09/22/2024        This note was generated with the assistance of ambient listening technology. Verbal consent was obtained by the patient and accompanying visitor(s) for the recording of patient appointment to facilitate this note. I attest to having reviewed and edited the generated note for accuracy, though some syntax or spelling errors may persist. Please contact the author of this note for any clarification.         Answers submitted by the patient for this visit:  Review of Systems Questionnaire (Submitted on 12/17/2024)  activity change: Yes  unexpected  weight change: Yes  neck pain: No  hearing loss: No  rhinorrhea: No  trouble swallowing: No  eye discharge: No  visual disturbance: No  chest tightness: No  wheezing: No  chest pain: No  palpitations: No  blood in stool: No  constipation: No  vomiting: No  diarrhea: No  polydipsia: No  polyuria: No  difficulty urinating: No  hematuria: No  menstrual problem: No  dysuria: No  arthralgias: Yes  headaches: No  weakness: No  confusion: No  dysphoric mood: No

## 2024-12-23 LAB — COPPER SERPL-MCNC: 1303 UG/L (ref 810–1990)

## 2024-12-24 LAB
PYRIDOXAL SERPL-MCNC: 12 UG/L (ref 5–50)
VIT B1 BLD-MCNC: 86 UG/L (ref 38–122)

## 2024-12-26 ENCOUNTER — PATIENT MESSAGE (OUTPATIENT)
Dept: INTERNAL MEDICINE | Facility: CLINIC | Age: 54
End: 2024-12-26
Payer: COMMERCIAL

## 2024-12-26 RX ORDER — LANOLIN ALCOHOL/MO/W.PET/CERES
1000 CREAM (GRAM) TOPICAL DAILY
Qty: 30 TABLET | Refills: 12 | Status: SHIPPED | OUTPATIENT
Start: 2024-12-26

## 2024-12-26 RX ORDER — VIT C/E/ZN/COPPR/LUTEIN/ZEAXAN 250MG-90MG
2000 CAPSULE ORAL DAILY
Qty: 60 CAPSULE | Refills: 12 | Status: SHIPPED | OUTPATIENT
Start: 2024-12-26

## 2025-04-24 ENCOUNTER — OFFICE VISIT (OUTPATIENT)
Dept: OBSTETRICS AND GYNECOLOGY | Facility: CLINIC | Age: 55
End: 2025-04-24
Payer: COMMERCIAL

## 2025-04-24 VITALS
BODY MASS INDEX: 37.18 KG/M2 | WEIGHT: 230.38 LBS | SYSTOLIC BLOOD PRESSURE: 120 MMHG | DIASTOLIC BLOOD PRESSURE: 75 MMHG

## 2025-04-24 DIAGNOSIS — Z12.4 CERVICAL CANCER SCREENING: ICD-10-CM

## 2025-04-24 DIAGNOSIS — Z01.419 ROUTINE GYNECOLOGICAL EXAMINATION: Primary | ICD-10-CM

## 2025-04-24 PROCEDURE — 99999 PR PBB SHADOW E&M-EST. PATIENT-LVL III: CPT | Mod: PBBFAC,,, | Performed by: OBSTETRICS & GYNECOLOGY

## 2025-04-24 NOTE — PROGRESS NOTES
56 yo postmenopausal female who presents today for routine gyn visit.  The patient had an endometrial ablation for menorrhagia.  Denies episodes of PMB.  Has hot flashes that cause her body to itch all over at nighttime.  No other other complaints today.   since 2024.  No new sex partners.   S/p breast reduction procedure in 2024.    ROS:  GENERAL: Denies weight gain or weight loss. Feeling well overall.   SKIN: Denies rash or lesions.   HEAD: Denies head injury or headache.   CHEST: Denies chest pain or shortness of breath.   CARDIOVASCULAR: Denies palpitations or left sided chest pain.   ABDOMEN: No abdominal pain, constipation, diarrhea, nausea, vomiting or rectal bleeding.   URINARY: No frequency, dysuria, hematuria, or burning on urination.  REPRODUCTIVE: per HPI  BREASTS:  denies pain, lumps, or nipple discharge.   HEMATOLOGIC: No easy bruisability or excessive bleeding.   MUSCULOSKELETAL: Denies joint pain or swelling.   NEUROLOGIC: Denies syncope or weakness.   PSYCHIATRIC: Denies depression, anxiety or mood swings.     PE:   Vitals: /75   Wt 104.5 kg (230 lb 6.1 oz)   BMI 37.18 kg/m²   APPEARANCE: Well nourished, well developed, in no acute distress.  ABDOMEN: Soft. No tenderness or masses. No hepatosplenomegaly. No hernias. Obese.  BREASTS: Symmetrical, no skin changes or visible lesions. No palpable masses, nipple discharge or adenopathy bilaterally.  PELVIC: Normal external female genitalia without lesions. Normal hair distribution. Adequate perineal body, normal urethral meatus. Vagina moist and well rugated without lesions or discharge. Cervix pink and without lesions. (difficult to see given body habitus) No significant cystocele or rectocele. Bimanual exam showed uterus normal size, shape, position, mobile and nontender. Adnexa without masses or tenderness. Urethra and bladder normal.  EXTREMITIES: No clubbing cyanosis or edema.      AP  Routine gyn  -s/p normal breast exam: mammogram  uptodate  -s/p normal pelvic exam:   -Pap collected  -STD testing: declined  -colon cancer screening completed with Dr. Pandey, PCP  -encourage to try antihistamine with body itching    GEOVANY Kaba MD

## 2025-05-01 ENCOUNTER — RESULTS FOLLOW-UP (OUTPATIENT)
Dept: OBSTETRICS AND GYNECOLOGY | Facility: CLINIC | Age: 55
End: 2025-05-01

## 2025-05-13 ENCOUNTER — TELEPHONE (OUTPATIENT)
Dept: FAMILY MEDICINE | Facility: CLINIC | Age: 55
End: 2025-05-13
Payer: COMMERCIAL

## 2025-05-13 DIAGNOSIS — Z87.898 HISTORY OF ABNORMAL MAMMOGRAM: Primary | ICD-10-CM

## 2025-05-13 NOTE — TELEPHONE ENCOUNTER
----- Message from Cooper sent at 5/12/2025  3:00 PM CDT -----  Contact: [phone]  [email or web address] Patient is calling for Medical Advice regarding:[facility] is calling in regards to pt stating she needs a right side diagnostic Mammo and needs someone on to call as soon as possible Patient wants a call back or thru [facility]:call back Comments:Fax Number [phone] advise patient replies from provider may take up to 48 hours.  ----- Message -----  From: Cooper Lucas  Sent: 5/12/2025   3:02 PM CDT  To: Dannie Vincent    .[email or web address] Patient is calling for Medical Advice regarding:[facility] is calling in regards to pt stating she needs a right side diagnostic Mammo and needs someone on to call as soon as possible Patient wants a call back or thru [facility]:call back Comments:Please advise patient replies from provider may take up to 48 hours.

## 2025-05-13 NOTE — TELEPHONE ENCOUNTER
:[facility] is calling in regards to pt stating she needs a right side diagnostic Mammo

## (undated) DEVICE — SUT MCRYL PLUS 4-0 PS2 27IN

## (undated) DEVICE — DRAPE STERI U-SHAPED 47X51IN

## (undated) DEVICE — Device

## (undated) DEVICE — MIXER BONE CEMENT

## (undated) DEVICE — SUT VICRYL+ 1 CT1 18IN

## (undated) DEVICE — GLOVE BIOGEL SKINSENSE PI 7.0

## (undated) DEVICE — SEE MEDLINE ITEM 152523

## (undated) DEVICE — SOL 9P NACL IRR PIC IL

## (undated) DEVICE — DRAPE STERI INSTRUMENT 1018

## (undated) DEVICE — SEE MEDLINE ITEM 153151

## (undated) DEVICE — SUT STRATAFIX PDO 2-0 MH

## (undated) DEVICE — SEE MEDLINE ITEM 152530

## (undated) DEVICE — DRAPE PLASTIC U 60X72

## (undated) DEVICE — SYRINGE 0.9% NACL 10MIL PREFIL

## (undated) DEVICE — SEE MEDLINE ITEM 157150

## (undated) DEVICE — PAD CAST SPECIALIST STRL 6

## (undated) DEVICE — PAD ELECTRODE STER 1.5X3

## (undated) DEVICE — PADDING CAST 6IN

## (undated) DEVICE — BLADE RECP OFFSET 77.5X11X1.23

## (undated) DEVICE — SYS CLSR DERMABOND PRINEO 22CM

## (undated) DEVICE — GOWN B1 X-LG X-LONG

## (undated) DEVICE — SEE MEDLINE ITEM 152764

## (undated) DEVICE — CONTAINER SPECIMEN STRL 4OZ

## (undated) DEVICE — CAUTERY TIP POLISHER

## (undated) DEVICE — KIT IRR SUCTION HND PIECE

## (undated) DEVICE — BLADE SAG 18.0X1.27X100

## (undated) DEVICE — SUT VICRYL PLUS 3-0 SH 18IN

## (undated) DEVICE — SEE MEDLINE ITEM 146298

## (undated) DEVICE — UNDERGLOVES BIOGEL PI SIZE 7.5

## (undated) DEVICE — KIT PATIENT BREG K500

## (undated) DEVICE — APPLICATOR CHLORAPREP ORN 26ML

## (undated) DEVICE — UNDERGLOVES BIOGEL PI SIZE 8.5

## (undated) DEVICE — SYS PRINEO SKIN CLOSURE

## (undated) DEVICE — TOURNIQUET SB QC SP 34X4IN

## (undated) DEVICE — PAD ABD 8X10 STERILE

## (undated) DEVICE — DRESSING COVER AQUACEL AG SURG

## (undated) DEVICE — GLOVE BIOGEL SKINSENSE PI 8.5

## (undated) DEVICE — GAUZE SPONGE 4X4 12PLY

## (undated) DEVICE — TOURNIQUET SB QC DP 44X4IN

## (undated) DEVICE — BLADE SAW SAG 6.30X12.70X.64

## (undated) DEVICE — DRESSING TELFA STRL 4X3 LF

## (undated) DEVICE — TRAY FOLEY 16FR INFECTION CONT

## (undated) DEVICE — SET BASIN 48X48IN 6000ML RING

## (undated) DEVICE — ELECTRODE REM PLYHSV RETURN 9

## (undated) DEVICE — DRESSING XEROFORM GAUZE 5X9

## (undated) DEVICE — UNDERGLOVES BIOGEL PI SZ 7 LF

## (undated) DEVICE — NDL 18GA X1 1/2 REG BEVEL

## (undated) DEVICE — PAD COLD THERAPY KNEE WRAP ON

## (undated) DEVICE — SPONGE LAP 18X18 PREWASHED

## (undated) DEVICE — IMMOB KNEE UNIV TRI PANEL 19IN

## (undated) DEVICE — SOL IRR NACL .9% 3000ML

## (undated) DEVICE — DRAPE INCISE IOBAN 2 23X17IN

## (undated) DEVICE — SUT STRATAFIX PDO 1 CT-1

## (undated) DEVICE — SYR 30CC LUER LOCK

## (undated) DEVICE — COVER BACK TABLE 72X21

## (undated) DEVICE — NDL SPINAL 20GX3.5 HUB

## (undated) DEVICE — SEE MEDLINE ITEM 152622

## (undated) DEVICE — HOOD T-5 TEAR AWAY STERILE

## (undated) DEVICE — KIT EVACUATOR 3-SPRING 1/8 DRN

## (undated) DEVICE — TAPE SILK 3IN